# Patient Record
Sex: FEMALE | Race: WHITE | NOT HISPANIC OR LATINO
[De-identification: names, ages, dates, MRNs, and addresses within clinical notes are randomized per-mention and may not be internally consistent; named-entity substitution may affect disease eponyms.]

---

## 2017-05-30 ENCOUNTER — APPOINTMENT (OUTPATIENT)
Dept: RADIOLOGY | Facility: HOSPITAL | Age: 42
End: 2017-05-30

## 2017-05-30 ENCOUNTER — OUTPATIENT (OUTPATIENT)
Dept: OUTPATIENT SERVICES | Facility: HOSPITAL | Age: 42
LOS: 1 days | End: 2017-05-30
Payer: COMMERCIAL

## 2017-05-30 PROBLEM — Z00.00 ENCOUNTER FOR PREVENTIVE HEALTH EXAMINATION: Status: ACTIVE | Noted: 2017-05-30

## 2017-05-30 PROCEDURE — 72100 X-RAY EXAM L-S SPINE 2/3 VWS: CPT

## 2017-05-30 PROCEDURE — 72100 X-RAY EXAM L-S SPINE 2/3 VWS: CPT | Mod: 26

## 2019-03-07 ENCOUNTER — APPOINTMENT (OUTPATIENT)
Dept: NEUROLOGY | Facility: CLINIC | Age: 44
End: 2019-03-07
Payer: COMMERCIAL

## 2019-03-07 VITALS
RESPIRATION RATE: 20 BRPM | HEART RATE: 103 BPM | SYSTOLIC BLOOD PRESSURE: 110 MMHG | WEIGHT: 105 LBS | BODY MASS INDEX: 19.32 KG/M2 | OXYGEN SATURATION: 99 % | HEIGHT: 62 IN | DIASTOLIC BLOOD PRESSURE: 70 MMHG | TEMPERATURE: 98 F

## 2019-03-07 PROCEDURE — 99204 OFFICE O/P NEW MOD 45 MIN: CPT

## 2019-03-08 ENCOUNTER — TRANSCRIPTION ENCOUNTER (OUTPATIENT)
Age: 44
End: 2019-03-08

## 2019-03-08 ENCOUNTER — APPOINTMENT (OUTPATIENT)
Dept: ORTHOPEDIC SURGERY | Facility: CLINIC | Age: 44
End: 2019-03-08
Payer: COMMERCIAL

## 2019-03-08 VITALS — HEIGHT: 62 IN | BODY MASS INDEX: 19.32 KG/M2 | WEIGHT: 105 LBS

## 2019-03-08 DIAGNOSIS — Z82.49 FAMILY HISTORY OF ISCHEMIC HEART DISEASE AND OTHER DISEASES OF THE CIRCULATORY SYSTEM: ICD-10-CM

## 2019-03-08 DIAGNOSIS — Z78.9 OTHER SPECIFIED HEALTH STATUS: ICD-10-CM

## 2019-03-08 LAB
BASOPHILS # BLD AUTO: 0.06 K/UL
BASOPHILS NFR BLD AUTO: 0.6 %
EOSINOPHIL # BLD AUTO: 0.4 K/UL
EOSINOPHIL NFR BLD AUTO: 3.8 %
HCT VFR BLD CALC: 42.4 %
HGB BLD-MCNC: 13.6 G/DL
IMM GRANULOCYTES NFR BLD AUTO: 0.3 %
LYMPHOCYTES # BLD AUTO: 3.01 K/UL
LYMPHOCYTES NFR BLD AUTO: 28.4 %
MAN DIFF?: NORMAL
MCHC RBC-ENTMCNC: 30.9 PG
MCHC RBC-ENTMCNC: 32.1 GM/DL
MCV RBC AUTO: 96.4 FL
MONOCYTES # BLD AUTO: 0.89 K/UL
MONOCYTES NFR BLD AUTO: 8.4 %
NEUTROPHILS # BLD AUTO: 6.2 K/UL
NEUTROPHILS NFR BLD AUTO: 58.5 %
PLATELET # BLD AUTO: 280 K/UL
RBC # BLD: 4.4 M/UL
RBC # FLD: 13.1 %
WBC # FLD AUTO: 10.59 K/UL

## 2019-03-08 PROCEDURE — 99204 OFFICE O/P NEW MOD 45 MIN: CPT

## 2019-03-08 NOTE — PHYSICAL EXAM
[de-identified] : Constitutional\par o Appearance : well-nourished, well developed, alert, in no acute distress \par Head and Face\par o Head :\par ¦ Inspection : atraumatic, normocephalic\par o Face :\par ¦ Inspection : no visible rash or discoloration\par Respiratory\par o Respiratory Effort: breathing unlabored \par Lymphatic\par o Epitrochlear Nodes : no lymphadenopathy\par o Popliteal Nodes : no palpable nodes present\par o Supraclavicular Nodes : no supraclavicular nodes present\par o Preauricular Nodes : no preauricular nodes present\par o Additional Nodes : no additional adenopathy present \par Skin and Subcutaneous Tissue\par o Head and Neck :\par ¦ Face : no facial lesions\par o Body Hair : body hair distribution normal for age \par Neurologic\par o Mental Status Examination :\par ¦ Orientation : grossly oriented to person, place and time \par o Sensation :\par ¦ Pin Prick : sensation intact to pin prick in extremities\par o Coordination : finger-to-nose-to-finger testing normal bilaterally, heel-shin cerebellar test within normal limits bilaterally \par Psychiatric\par o Mood and Affect: mood normal, affect appropriate \par Cardiovascular\par o Peripheral Vascular System :\par ¦ Abdominal Aorta : aortic pulse normal without bruits\par ¦ Femoral Artery : pulse 2+\par ¦ Dorsalis Pedis Artery : pulse 2+\par \par Lumbosacral Spine\par o Inspection : no lesions or deformities\par o Palpation : paraspinal musculature nontender, sciatic notch tenderness on the right. \par o Stability : no subluxations present\par o Range of Motion : side bending to right provokes right buttock pain, otherwise full and painless arc of motion in all planes, rotation does not provoke discomfort\par o Strength: extension of the toes 5/5\par o Tests: Straight leg raising and Baltazar tests are negative\par Constitutional\par o Appearance : well-nourished, well developed, alert, in no acute distress \par Head and Face\par o Head :\par ¦ Inspection : atraumatic, normocephalic\par o Face :\par ¦ Inspection : no visible rash or discoloration\par Lymphatic\par o Epitrochlear Nodes : no lymphadenopathy \par o Popliteal Nodes : no palpable nodes present \par o Supraclavicular Nodes : no supraclavicular nodes present \par o Preauricular Nodes : no preauricular nodes present \par o Additional Nodes : no additional adenopathy present \par Skin and Subcutaneous Tissue \par o Head and Neck :\par ¦ Face : no facial lesions \par o Trunk :\par ¦ Back : no rashes present, no lesions present, no areas of discoloration \par Body Hair : body hair distribution normal for age\par Neurologic\par o Mental Status Examination :\par ¦ Orientation : alert and oriented X 3\par o Coordination : finger-to-nose-to-finger testing normal bilaterally, heel-shin cerebellar test within normal limits bilaterally \par Psychiatric\par o Mood and Affect: mood normal, affect appropriate \par Cardiovascular\par o Peripheral Vascular System :\par ¦ Femoral Artery : pulse 2+\par ¦ Dorsalis Pedis Artery : pulse 2+\par ¦ Posterior Tibial Artery : pulse 2+\par \par Right Lower Extremity\par o Buttock : no tenderness, swelling or deformities \par o Hip :\par ¦ Inspection/Palpation : no tenderness, swelling or deformities\par ¦ Range of Motion : full and painless in all planes, no crepitance\par ¦ Stability : joint stability intact\par ¦ Strength : extension, flexion, adduction, abduction, internal rotation and external rotation 4-/5 \par \par Left Lower Extremity\par o Buttock : no tenderness, swelling or deformities \par o Hip :\par ¦ Inspection/Palpation : no tenderness, swelling or deformities\par ¦ Range of Motion : full and painless in all planes, no crepitance\par ¦ Stability : joint stability intact\par ¦ Strength : extension, flexion, adduction, abduction, internal rotation and external rotation 4-/5\par  \par Right Lower Extremity\par o Reflexes : patellar tendon reflex 3+, ankle reflex 2+, Babinski sign absent (toes downgoing) \par Left Lower Extremity\par o Reflexes : patellar tendon reflex 3+, ankle reflex 2+, Babinski sign absent (toes downgoing) \par \par Gait and Station\par o Gait: gait normal, poor core strength

## 2019-03-08 NOTE — DISCUSSION/SUMMARY
[de-identified] : At this point, we talked about the nature of the condition and treatment options. I am strongly recommending she continue with physical therapy,  and prescription for PT was provided for her lower back, core strengthening. She will avoid heavy lifting, extension exercises, and use ice prn. We do not believe she is a surgical candidate as she continues to improve.

## 2019-03-08 NOTE — HISTORY OF PRESENT ILLNESS
[de-identified] : 43 year old female, employed as a , presents with low back pain onset 2-3 years ago that worsened over the past 4 months. She denies specific trauma/injury. Pain provoked with walking more than one city block. She complains of numbness/tingling all the way through the toes in an L5 distribution. She has right leg pain which she attributes to compensating. She had 3 epidural injections with Dr. Sargent with moderate relief of symptoms. She is currently enrolled in formal physical therapy. \par \par MRI lumbar spine 11- at [facility] was reviewed and reveals spondylosis with disc desiccation and disc bulges L2/L5 most prominent at L4/5 with facet arthropathy L3/4 and L4/5, moderate spinal central stenosis, compression L5 nerve root on the left.

## 2019-03-09 LAB
FOLATE SERPL-MCNC: 15.9 NG/ML
VIT B12 SERPL-MCNC: 642 PG/ML

## 2019-03-09 NOTE — ASSESSMENT
[FreeTextEntry1] : Advised neurosurgical opinion for lumbar canal stenosis.\par \par R/O B12 deficiency.

## 2019-03-09 NOTE — PHYSICAL EXAM
[Person] : oriented to person [Place] : oriented to place [Time] : oriented to time [Concentration Intact] : normal concentrating ability [Visual Intact] : visual attention was ~T not ~L decreased [Naming Objects] : no difficulty naming common objects [Repeating Phrases] : no difficulty repeating a phrase [Writing A Sentence] : no difficulty writing a sentence [Fluency] : fluency intact [Comprehension] : comprehension intact [Reading] : reading intact [Past History] : adequate knowledge of personal past history [Cranial Nerves Optic (II)] : visual acuity intact bilaterally,  visual fields full to confrontation, pupils equal round and reactive to light [Cranial Nerves Oculomotor (III)] : extraocular motion intact [Cranial Nerves Trigeminal (V)] : facial sensation intact symmetrically [Cranial Nerves Facial (VII)] : face symmetrical [Cranial Nerves Vestibulocochlear (VIII)] : hearing was intact bilaterally [Cranial Nerves Glossopharyngeal (IX)] : tongue and palate midline [Cranial Nerves Accessory (XI - Cranial And Spinal)] : head turning and shoulder shrug symmetric [Cranial Nerves Hypoglossal (XII)] : there was no tongue deviation with protrusion [Motor Tone] : muscle tone was normal in all four extremities [Motor Strength] : muscle strength was normal in all four extremities [No Muscle Atrophy] : normal bulk in all four extremities [Sensation Tactile Decrease] : light touch was intact [Romberg's Sign] : a positive Romberg's sign was present [Abnormal Walk] : normal gait [Balance] : balance was intact [Past-pointing] : there was no past-pointing [Tremor] : no tremor present [3+] : Patella left 3+ [2+] : Ankle jerk left 2+ [Plantar Reflex Right Only] : normal on the right [Plantar Reflex Left Only] : normal on the left [___] : absent on the right [___] : absent on the left [FreeTextEntry7] : distal sensory loss to pin and vibration. Position sense impaired in toes

## 2019-03-13 LAB — METHYLMALONATE SERPL-SCNC: 109 NMOL/L

## 2019-03-14 ENCOUNTER — TRANSCRIPTION ENCOUNTER (OUTPATIENT)
Age: 44
End: 2019-03-14

## 2019-03-19 ENCOUNTER — APPOINTMENT (OUTPATIENT)
Dept: SPINE | Facility: CLINIC | Age: 44
End: 2019-03-19
Payer: COMMERCIAL

## 2019-03-19 VITALS
BODY MASS INDEX: 20.24 KG/M2 | HEIGHT: 62 IN | DIASTOLIC BLOOD PRESSURE: 83 MMHG | WEIGHT: 110 LBS | SYSTOLIC BLOOD PRESSURE: 153 MMHG

## 2019-03-19 DIAGNOSIS — R20.0 ANESTHESIA OF SKIN: ICD-10-CM

## 2019-03-19 PROCEDURE — 99203 OFFICE O/P NEW LOW 30 MIN: CPT

## 2019-03-19 NOTE — REASON FOR VISIT
[New Patient Visit] : a new patient visit [Referred By: _________] : Patient was referred by FELICITA [Other: _____] : [unfilled] [FreeTextEntry1] : Lumbar radiculopathy

## 2019-03-19 NOTE — HISTORY OF PRESENT ILLNESS
[> 3 months] : more  than 3 months [FreeTextEntry1] : Lower extremity numbness and difficulty walking [de-identified] : 43 year old white female who presents with a two and half year history of lumbar back pain and lower  extremity numbness and pain into the ankles.  She has weakness of her left foot and  bilateral numbness of her soles.  She has undergone  PT and epidural injections which have not resolved her pain and numbness.  She was placed on Gabapentin and has noted elvia difference.

## 2019-03-19 NOTE — ASSESSMENT
[FreeTextEntry1] : Assess;\par lumbar radiculopathy\par Herniated disc at L5 S 1\par Failed conservative management of PT and epidurals\par Weakness and foot drop of left leg \par \par PLAN: \par Surgical intervention discussed\par Microdiskectomy vs fusion, will need scoliosis xray\par scoliosis x-ray and return to office

## 2019-03-25 NOTE — HISTORY OF PRESENT ILLNESS
[FreeTextEntry1] : 43 yr-old woman noted onset of numbness in toes of right foot 3 years ago and left foot 1 year ago. Her legs feel heavy and weak after walking  or with prolonged standing. She had MRI of LS spine 4 months ago which showed an L4-5 HNP with moderate central canal stenosis at that level. Tarlov cysts were noted at the S2 level. A mild scoliosis was also seen. Melolabial Transposition Flap Text: The defect edges were debeveled with a #15 scalpel blade.  Given the location of the defect and the proximity to free margins a melolabial flap was deemed most appropriate.  Using a sterile surgical marker, an appropriate melolabial transposition flap was drawn incorporating the defect.    The area thus outlined was incised deep to adipose tissue with a #15 scalpel blade.  The skin margins were undermined to an appropriate distance in all directions utilizing iris scissors.

## 2019-04-04 ENCOUNTER — OUTPATIENT (OUTPATIENT)
Dept: OUTPATIENT SERVICES | Facility: HOSPITAL | Age: 44
LOS: 1 days | End: 2019-04-04
Payer: COMMERCIAL

## 2019-04-04 ENCOUNTER — APPOINTMENT (OUTPATIENT)
Dept: RADIOLOGY | Facility: CLINIC | Age: 44
End: 2019-04-04
Payer: COMMERCIAL

## 2019-04-04 DIAGNOSIS — M48.062 SPINAL STENOSIS, LUMBAR REGION WITH NEUROGENIC CLAUDICATION: ICD-10-CM

## 2019-04-04 PROCEDURE — 72082 X-RAY EXAM ENTIRE SPI 2/3 VW: CPT | Mod: 26

## 2019-04-04 PROCEDURE — 72082 X-RAY EXAM ENTIRE SPI 2/3 VW: CPT

## 2019-04-06 ENCOUNTER — APPOINTMENT (OUTPATIENT)
Dept: ORTHOPEDIC SURGERY | Facility: CLINIC | Age: 44
End: 2019-04-06
Payer: COMMERCIAL

## 2019-04-06 VITALS
BODY MASS INDEX: 19.14 KG/M2 | WEIGHT: 104 LBS | HEIGHT: 62 IN | SYSTOLIC BLOOD PRESSURE: 91 MMHG | DIASTOLIC BLOOD PRESSURE: 49 MMHG | HEART RATE: 96 BPM

## 2019-04-06 DIAGNOSIS — Z87.39 PERSONAL HISTORY OF OTHER DISEASES OF THE MUSCULOSKELETAL SYSTEM AND CONNECTIVE TISSUE: ICD-10-CM

## 2019-04-06 DIAGNOSIS — M51.26 OTHER INTERVERTEBRAL DISC DISPLACEMENT, LUMBAR REGION: ICD-10-CM

## 2019-04-06 DIAGNOSIS — M99.79 CONNECTIVE TISSUE AND DISC STENOSIS OF INTERVERTEBRAL FORAMINA OF ABDOMEN AND OTHER REGIONS: ICD-10-CM

## 2019-04-06 PROCEDURE — 99213 OFFICE O/P EST LOW 20 MIN: CPT

## 2019-04-06 RX ORDER — NITROFURANTOIN (MONOHYDRATE/MACROCRYSTALS) 25; 75 MG/1; MG/1
100 CAPSULE ORAL
Qty: 14 | Refills: 0 | Status: DISCONTINUED | COMMUNITY
Start: 2018-12-18

## 2019-04-09 ENCOUNTER — APPOINTMENT (OUTPATIENT)
Dept: ORTHOPEDIC SURGERY | Facility: CLINIC | Age: 44
End: 2019-04-09

## 2019-04-09 ENCOUNTER — APPOINTMENT (OUTPATIENT)
Dept: SPINE | Facility: CLINIC | Age: 44
End: 2019-04-09

## 2019-04-29 ENCOUNTER — RX RENEWAL (OUTPATIENT)
Age: 44
End: 2019-04-29

## 2019-05-08 ENCOUNTER — APPOINTMENT (OUTPATIENT)
Dept: NEUROLOGY | Facility: CLINIC | Age: 44
End: 2019-05-08

## 2019-05-13 ENCOUNTER — APPOINTMENT (OUTPATIENT)
Dept: ORTHOPEDIC SURGERY | Facility: CLINIC | Age: 44
End: 2019-05-13

## 2019-06-21 ENCOUNTER — APPOINTMENT (OUTPATIENT)
Dept: OBGYN | Facility: CLINIC | Age: 44
End: 2019-06-21
Payer: COMMERCIAL

## 2019-06-21 VITALS
BODY MASS INDEX: 19.14 KG/M2 | WEIGHT: 104 LBS | HEIGHT: 62 IN | OXYGEN SATURATION: 98 % | HEART RATE: 105 BPM | DIASTOLIC BLOOD PRESSURE: 70 MMHG | SYSTOLIC BLOOD PRESSURE: 114 MMHG

## 2019-06-21 DIAGNOSIS — Z87.59 PERSONAL HISTORY OF OTHER COMPLICATIONS OF PREGNANCY, CHILDBIRTH AND THE PUERPERIUM: ICD-10-CM

## 2019-06-21 DIAGNOSIS — Z01.419 ENCOUNTER FOR GYNECOLOGICAL EXAMINATION (GENERAL) (ROUTINE) W/OUT ABNORMAL FINDINGS: ICD-10-CM

## 2019-06-21 DIAGNOSIS — Z12.11 ENCOUNTER FOR SCREENING FOR MALIGNANT NEOPLASM OF COLON: ICD-10-CM

## 2019-06-21 DIAGNOSIS — R92.2 INCONCLUSIVE MAMMOGRAM: ICD-10-CM

## 2019-06-21 DIAGNOSIS — Z12.31 ENCOUNTER FOR SCREENING MAMMOGRAM FOR MALIGNANT NEOPLASM OF BREAST: ICD-10-CM

## 2019-06-21 PROCEDURE — 99386 PREV VISIT NEW AGE 40-64: CPT

## 2019-06-21 NOTE — COUNSELING
[Breast Self Exam] : breast self exam [Vitamins/Supplements] : vitamins/supplements [Nutrition] : nutrition [Exercise] : exercise [Contraception] : contraception

## 2019-06-21 NOTE — CHIEF COMPLAINT
[Annual Visit] : annual visit [FreeTextEntry1] : P89 yo daughter--1st yr done at [employer]--loves\par never had mammo.Pap 4 yrs ago\par

## 2019-06-21 NOTE — HISTORY OF PRESENT ILLNESS
[Good] : being in good health [Reproductive Age] : is of reproductive age [HPV Vaccine NA Due to Age] : HPV vaccine not available to patient due to age [Contraception] : uses contraception [Condoms] : uses condoms [Sexually Active] : is sexually active

## 2019-06-23 LAB — HPV HIGH+LOW RISK DNA PNL CVX: NOT DETECTED

## 2019-06-26 LAB — CYTOLOGY CVX/VAG DOC THIN PREP: NORMAL

## 2019-08-27 ENCOUNTER — OUTPATIENT (OUTPATIENT)
Dept: OUTPATIENT SERVICES | Facility: HOSPITAL | Age: 44
LOS: 1 days | Discharge: HOME | End: 2019-08-27
Payer: COMMERCIAL

## 2019-08-27 VITALS
SYSTOLIC BLOOD PRESSURE: 137 MMHG | OXYGEN SATURATION: 99 % | HEART RATE: 81 BPM | DIASTOLIC BLOOD PRESSURE: 88 MMHG | TEMPERATURE: 98 F | WEIGHT: 103.62 LBS | HEIGHT: 62 IN | RESPIRATION RATE: 16 BRPM

## 2019-08-27 DIAGNOSIS — Z01.818 ENCOUNTER FOR OTHER PREPROCEDURAL EXAMINATION: ICD-10-CM

## 2019-08-27 DIAGNOSIS — M54.16 RADICULOPATHY, LUMBAR REGION: ICD-10-CM

## 2019-08-27 DIAGNOSIS — Z87.59 PERSONAL HISTORY OF OTHER COMPLICATIONS OF PREGNANCY, CHILDBIRTH AND THE PUERPERIUM: Chronic | ICD-10-CM

## 2019-08-27 LAB
ANION GAP SERPL CALC-SCNC: 4 MMOL/L — LOW (ref 5–17)
APTT BLD: 28 SEC — SIGNIFICANT CHANGE UP (ref 27.5–36.3)
BUN SERPL-MCNC: 11 MG/DL — SIGNIFICANT CHANGE UP (ref 7–23)
CALCIUM SERPL-MCNC: 8.4 MG/DL — LOW (ref 8.5–10.1)
CHLORIDE SERPL-SCNC: 109 MMOL/L — HIGH (ref 96–108)
CO2 SERPL-SCNC: 29 MMOL/L — SIGNIFICANT CHANGE UP (ref 22–31)
CREAT SERPL-MCNC: 0.74 MG/DL — SIGNIFICANT CHANGE UP (ref 0.5–1.3)
GLUCOSE SERPL-MCNC: 86 MG/DL — SIGNIFICANT CHANGE UP (ref 70–99)
HBA1C BLD-MCNC: 5.4 % — SIGNIFICANT CHANGE UP (ref 4–5.6)
HCG UR QL: NEGATIVE — SIGNIFICANT CHANGE UP
HCT VFR BLD CALC: 42.9 % — SIGNIFICANT CHANGE UP (ref 34.5–45)
HGB BLD-MCNC: 14.2 G/DL — SIGNIFICANT CHANGE UP (ref 11.5–15.5)
INR BLD: 0.96 RATIO — SIGNIFICANT CHANGE UP (ref 0.88–1.16)
MCHC RBC-ENTMCNC: 32.4 PG — SIGNIFICANT CHANGE UP (ref 27–34)
MCHC RBC-ENTMCNC: 33.1 GM/DL — SIGNIFICANT CHANGE UP (ref 32–36)
MCV RBC AUTO: 97.9 FL — SIGNIFICANT CHANGE UP (ref 80–100)
MRSA PCR RESULT.: SIGNIFICANT CHANGE UP
NRBC # BLD: 0 /100 WBCS — SIGNIFICANT CHANGE UP (ref 0–0)
PLATELET # BLD AUTO: 292 K/UL — SIGNIFICANT CHANGE UP (ref 150–400)
POTASSIUM SERPL-MCNC: 4.1 MMOL/L — SIGNIFICANT CHANGE UP (ref 3.5–5.3)
POTASSIUM SERPL-SCNC: 4.1 MMOL/L — SIGNIFICANT CHANGE UP (ref 3.5–5.3)
PROTHROM AB SERPL-ACNC: 10.7 SEC — SIGNIFICANT CHANGE UP (ref 10–12.9)
RBC # BLD: 4.38 M/UL — SIGNIFICANT CHANGE UP (ref 3.8–5.2)
RBC # FLD: 12.9 % — SIGNIFICANT CHANGE UP (ref 10.3–14.5)
S AUREUS DNA NOSE QL NAA+PROBE: SIGNIFICANT CHANGE UP
SODIUM SERPL-SCNC: 142 MMOL/L — SIGNIFICANT CHANGE UP (ref 135–145)
WBC # BLD: 10.33 K/UL — SIGNIFICANT CHANGE UP (ref 3.8–10.5)
WBC # FLD AUTO: 10.33 K/UL — SIGNIFICANT CHANGE UP (ref 3.8–10.5)

## 2019-08-27 PROCEDURE — 93010 ELECTROCARDIOGRAM REPORT: CPT

## 2019-08-27 NOTE — H&P PST ADULT - NEGATIVE MUSCULOSKELETAL SYMPTOMS
no leg pain L/no muscle cramps/no muscle weakness/no neck pain/no arm pain L/no arm pain R/no stiffness/no leg pain R/no joint swelling/no myalgia

## 2019-08-27 NOTE — H&P PST ADULT - HISTORY OF PRESENT ILLNESS
43 y/o female w/ no PMH   pt states 2016 started feeling low back pain, went to chiroprator, accupuncture, pt,  and 4 cortisone shoots w/o relief. States has scoliosis but also dx w/ herniated disc noted on MRI x 2 done by Dr Dupont. 45 y/o female w/ no PMH. Presents to PST w/ a preop dx of radiculopathy, lumbar region and to be evaluated for a scheduled decompression laminectomy no fusion left L4-5with image on 9/10/19.  pt states 2016 started feeling low back pain, went to chiropractor acupuncture pt,  and 4 cortisone shoots w/o relief. States has scoliosis but also dx w/ herniated disc noted on MRI x 2 done by Dr Dupont. 43 y/o female w/ no PMH. Presents to PST w/ a preop dx of radiculopathy, lumbar region and to be evaluated for a scheduled decompression laminectomy no fusion left L4-5with image on 9/10/19.  pt states 2016 started feeling low back pain, went to chiropractor acupuncture, Pt,  and 4 cortisone shoots w/o relief. States has scoliosis but also dx w/ herniated disc noted on MRI x 2 done by Dr Dupont. Pt now recommended surgical intervention.

## 2019-08-27 NOTE — H&P PST ADULT - NSICDXPROBLEM_GEN_ALL_CORE_FT
PROBLEM DIAGNOSES  Problem: Lumbar radiculopathy  Assessment and Plan: Pt. evaluated for a scheduled decompression laminectomy no fusion left L4-5with image on 9/10/19. preop instructions provided including NPO status, Hibiclens protocol, stop MVI, OTC herbals and NSAIDs starting on 9/3/19. Verbalized understanding.

## 2019-08-27 NOTE — H&P PST ADULT - ATTENDING COMMENTS
patient with L4-5 disc herniation and stenosis and failure of conservative tx - r/b/e of the operation and questions answered - she is well informed

## 2019-08-27 NOTE — H&P PST ADULT - ASSESSMENT
DX: DX: radiculopathy, lumbar region and evaluated for a scheduled decompression laminectomy no fusion left L4-5with image on 9/10/19.

## 2019-08-27 NOTE — H&P PST ADULT - NSANTHOSAYNRD_GEN_A_CORE
No. MYRIAM screening performed.  STOP BANG Legend: 0-2 = LOW Risk; 3-4 = INTERMEDIATE Risk; 5-8 = HIGH Risk/never tested

## 2019-08-27 NOTE — H&P PST ADULT - PRIMARY CARE PROVIDER
Med station station clinic in [facility] Med station station clinic in [facility] (sees different practitioners)

## 2019-08-27 NOTE — H&P PST ADULT - MUSCULOSKELETAL
details… detailed exam ROM intact/normal strength/no joint swelling/no joint warmth/no joint erythema/no calf tenderness

## 2019-08-27 NOTE — H&P PST ADULT - NEGATIVE NEUROLOGICAL SYMPTOMS
no tremors/no vertigo/no loss of consciousness/no hemiparesis/no difficulty walking/no confusion/no transient paralysis/no generalized seizures/no focal seizures/no headache/no syncope

## 2019-08-27 NOTE — H&P PST ADULT - RS GEN PE MLT RESP DETAILS PC
no chest wall tenderness/no rhonchi/no subcutaneous emphysema/respirations non-labored/no intercostal retractions/good air movement/no wheezes/airway patent/no rales/clear to auscultation bilaterally/normal

## 2019-09-09 ENCOUNTER — TRANSCRIPTION ENCOUNTER (OUTPATIENT)
Age: 44
End: 2019-09-09

## 2019-09-10 ENCOUNTER — OUTPATIENT (OUTPATIENT)
Dept: OUTPATIENT SERVICES | Facility: HOSPITAL | Age: 44
LOS: 1 days | Discharge: HOME | End: 2019-09-10

## 2019-09-10 VITALS
OXYGEN SATURATION: 97 % | HEIGHT: 62 IN | DIASTOLIC BLOOD PRESSURE: 85 MMHG | SYSTOLIC BLOOD PRESSURE: 134 MMHG | HEART RATE: 83 BPM | RESPIRATION RATE: 18 BRPM | TEMPERATURE: 97 F | WEIGHT: 103.62 LBS

## 2019-09-10 VITALS
OXYGEN SATURATION: 99 % | SYSTOLIC BLOOD PRESSURE: 125 MMHG | DIASTOLIC BLOOD PRESSURE: 77 MMHG | RESPIRATION RATE: 18 BRPM | HEART RATE: 90 BPM

## 2019-09-10 DIAGNOSIS — Z87.59 PERSONAL HISTORY OF OTHER COMPLICATIONS OF PREGNANCY, CHILDBIRTH AND THE PUERPERIUM: Chronic | ICD-10-CM

## 2019-09-10 LAB — HCG UR QL: NEGATIVE — SIGNIFICANT CHANGE UP

## 2019-09-10 RX ORDER — HYDROMORPHONE HYDROCHLORIDE 2 MG/ML
0.5 INJECTION INTRAMUSCULAR; INTRAVENOUS; SUBCUTANEOUS
Refills: 0 | Status: DISCONTINUED | OUTPATIENT
Start: 2019-09-10 | End: 2019-09-10

## 2019-09-10 RX ORDER — ACETAMINOPHEN 500 MG
1000 TABLET ORAL ONCE
Refills: 0 | Status: COMPLETED | OUTPATIENT
Start: 2019-09-10 | End: 2019-09-10

## 2019-09-10 RX ORDER — METOCLOPRAMIDE HCL 10 MG
10 TABLET ORAL ONCE
Refills: 0 | Status: DISCONTINUED | OUTPATIENT
Start: 2019-09-10 | End: 2019-09-10

## 2019-09-10 RX ORDER — ACETAMINOPHEN 500 MG
100 TABLET ORAL
Qty: 0 | Refills: 0 | DISCHARGE
Start: 2019-09-10

## 2019-09-10 RX ORDER — SODIUM CHLORIDE 9 MG/ML
1000 INJECTION, SOLUTION INTRAVENOUS
Refills: 0 | Status: DISCONTINUED | OUTPATIENT
Start: 2019-09-10 | End: 2019-09-10

## 2019-09-10 RX ADMIN — Medication 400 MILLIGRAM(S): at 11:07

## 2019-09-10 RX ADMIN — HYDROMORPHONE HYDROCHLORIDE 0.5 MILLIGRAM(S): 2 INJECTION INTRAMUSCULAR; INTRAVENOUS; SUBCUTANEOUS at 11:41

## 2019-09-10 RX ADMIN — HYDROMORPHONE HYDROCHLORIDE 0.5 MILLIGRAM(S): 2 INJECTION INTRAMUSCULAR; INTRAVENOUS; SUBCUTANEOUS at 13:17

## 2019-09-10 RX ADMIN — HYDROMORPHONE HYDROCHLORIDE 0.5 MILLIGRAM(S): 2 INJECTION INTRAMUSCULAR; INTRAVENOUS; SUBCUTANEOUS at 13:07

## 2019-09-10 RX ADMIN — HYDROMORPHONE HYDROCHLORIDE 0.5 MILLIGRAM(S): 2 INJECTION INTRAMUSCULAR; INTRAVENOUS; SUBCUTANEOUS at 11:55

## 2019-09-10 RX ADMIN — HYDROMORPHONE HYDROCHLORIDE 0.5 MILLIGRAM(S): 2 INJECTION INTRAMUSCULAR; INTRAVENOUS; SUBCUTANEOUS at 11:42

## 2019-09-10 RX ADMIN — HYDROMORPHONE HYDROCHLORIDE 0.5 MILLIGRAM(S): 2 INJECTION INTRAMUSCULAR; INTRAVENOUS; SUBCUTANEOUS at 12:19

## 2019-09-10 RX ADMIN — HYDROMORPHONE HYDROCHLORIDE 0.5 MILLIGRAM(S): 2 INJECTION INTRAMUSCULAR; INTRAVENOUS; SUBCUTANEOUS at 12:04

## 2019-09-10 RX ADMIN — SODIUM CHLORIDE 75 MILLILITER(S): 9 INJECTION, SOLUTION INTRAVENOUS at 11:08

## 2019-09-10 RX ADMIN — Medication 1000 MILLIGRAM(S): at 11:52

## 2019-09-10 RX ADMIN — HYDROMORPHONE HYDROCHLORIDE 0.5 MILLIGRAM(S): 2 INJECTION INTRAMUSCULAR; INTRAVENOUS; SUBCUTANEOUS at 11:06

## 2019-09-10 NOTE — ASU DISCHARGE PLAN (ADULT/PEDIATRIC) - CALL YOUR DOCTOR IF YOU HAVE ANY OF THE FOLLOWING:
Swelling that gets worse/Nausea and vomiting that does not stop/Pain not relieved by Medications/Fever greater than (need to indicate Fahrenheit or Celsius)/Wound/Surgical Site with redness, or foul smelling discharge or pus

## 2019-09-10 NOTE — ASU DISCHARGE PLAN (ADULT/PEDIATRIC) - ASU DC SPECIAL INSTRUCTIONSFT
okay to change dressing post op day #3 with sterile gauze. keep incision clean and dry. if gauze gets wet it must be changed.

## 2019-09-10 NOTE — ASU PATIENT PROFILE, ADULT - REASON FOR ADMISSION, PROFILE
[facility]  lumbar pain

## 2019-09-12 DIAGNOSIS — Z72.0 TOBACCO USE: ICD-10-CM

## 2019-09-12 DIAGNOSIS — Z79.1 LONG TERM (CURRENT) USE OF NON-STEROIDAL ANTI-INFLAMMATORIES (NSAID): ICD-10-CM

## 2019-09-12 DIAGNOSIS — M48.061 SPINAL STENOSIS, LUMBAR REGION WITHOUT NEUROGENIC CLAUDICATION: ICD-10-CM

## 2020-02-25 NOTE — ASU DISCHARGE PLAN (ADULT/PEDIATRIC) - OK TO LEAVE MESSAGE ON VOICEMAIL
Yes Complex Repair And O-L Flap Text: The defect edges were debeveled with a #15 scalpel blade.  The primary defect was closed partially with a complex linear closure.  Given the location of the remaining defect, shape of the defect and the proximity to free margins an O-L flap was deemed most appropriate for complete closure of the defect.  Using a sterile surgical marker, an appropriate flap was drawn incorporating the defect and placing the expected incisions within the relaxed skin tension lines where possible.    The area thus outlined was incised deep to adipose tissue with a #15 scalpel blade.  The skin margins were undermined to an appropriate distance in all directions utilizing iris scissors.

## 2020-12-21 PROBLEM — Z01.419 ENCOUNTER FOR ANNUAL ROUTINE GYNECOLOGICAL EXAMINATION: Status: RESOLVED | Noted: 2019-06-21 | Resolved: 2020-12-21

## 2021-02-13 ENCOUNTER — TRANSCRIPTION ENCOUNTER (OUTPATIENT)
Age: 46
End: 2021-02-13

## 2021-09-22 PROBLEM — Z78.9 OTHER SPECIFIED HEALTH STATUS: Chronic | Status: ACTIVE | Noted: 2019-08-27

## 2021-10-07 ENCOUNTER — NON-APPOINTMENT (OUTPATIENT)
Age: 46
End: 2021-10-07

## 2021-10-08 ENCOUNTER — NON-APPOINTMENT (OUTPATIENT)
Age: 46
End: 2021-10-08

## 2021-10-08 ENCOUNTER — APPOINTMENT (OUTPATIENT)
Dept: OPHTHALMOLOGY | Facility: CLINIC | Age: 46
End: 2021-10-08
Payer: COMMERCIAL

## 2021-10-08 PROCEDURE — 92083 EXTENDED VISUAL FIELD XM: CPT

## 2021-10-08 PROCEDURE — 92133 CPTRZD OPH DX IMG PST SGM ON: CPT

## 2021-10-08 PROCEDURE — 99204 OFFICE O/P NEW MOD 45 MIN: CPT

## 2023-04-30 ENCOUNTER — NON-APPOINTMENT (OUTPATIENT)
Age: 48
End: 2023-04-30

## 2023-05-01 ENCOUNTER — APPOINTMENT (OUTPATIENT)
Dept: NEUROLOGY | Facility: CLINIC | Age: 48
End: 2023-05-01
Payer: COMMERCIAL

## 2023-05-01 VITALS
SYSTOLIC BLOOD PRESSURE: 139 MMHG | DIASTOLIC BLOOD PRESSURE: 90 MMHG | WEIGHT: 105 LBS | HEART RATE: 90 BPM | HEIGHT: 62 IN | BODY MASS INDEX: 19.32 KG/M2

## 2023-05-01 DIAGNOSIS — Z87.891 PERSONAL HISTORY OF NICOTINE DEPENDENCE: ICD-10-CM

## 2023-05-01 DIAGNOSIS — F41.9 ANXIETY DISORDER, UNSPECIFIED: ICD-10-CM

## 2023-05-01 DIAGNOSIS — I10 ESSENTIAL (PRIMARY) HYPERTENSION: ICD-10-CM

## 2023-05-01 PROCEDURE — 99205 OFFICE O/P NEW HI 60 MIN: CPT

## 2023-05-01 RX ORDER — AMLODIPINE BESYLATE 5 MG/1
5 TABLET ORAL
Qty: 90 | Refills: 0 | Status: ACTIVE | COMMUNITY
Start: 2023-03-14

## 2023-05-01 RX ORDER — GABAPENTIN 300 MG/1
300 CAPSULE ORAL
Refills: 0 | Status: DISCONTINUED | COMMUNITY
End: 2023-05-01

## 2023-05-01 RX ORDER — TIZANIDINE 4 MG/1
4 TABLET ORAL
Qty: 20 | Refills: 0 | Status: DISCONTINUED | COMMUNITY
Start: 2019-04-06 | End: 2023-05-01

## 2023-05-01 RX ORDER — CLOBETASOL PROPIONATE 0.5 MG/G
0.05 CREAM TOPICAL
Qty: 60 | Refills: 0 | Status: ACTIVE | COMMUNITY
Start: 2023-03-28

## 2023-05-01 RX ORDER — ALPRAZOLAM 0.25 MG/1
0.25 TABLET ORAL
Qty: 7 | Refills: 0 | Status: ACTIVE | COMMUNITY
Start: 2023-03-14

## 2023-05-02 NOTE — PHYSICAL EXAM
[FreeTextEntry1] : PHYSICAL EXAM\par Constitutional: Alert, no acute distress \par Psychiatric: appropriate affect and mood\par Pulmonary: No respiratory distress, stable on room air\par \par NEUROLOGICAL EXAM\par Mental status: The patient is alert, attentive and conversational memory intact.\par Speech/language: Mild dysarthria \par Cranial nerves:\par CN II: ? RAPD OS\par CN III, IV, VI: EOMI, no nystagmus, no ptosis\par CN V: Facial sensation is intact to pinprick in all 3 divisions bilaterally.\par CN VII: Face is symmetric with normal eye closure and smile.\par CN VII: Hearing is normal to rubbing fingers\par CN IX, X: Palate elevates symmetrically. Phonation is normal.\par CN XI: Head turning and shoulder shrug are intact\par CN XII: Tongue is midline with normal movements and no atrophy.\par Motor: RUE 5/5. LUE 5/5, Except triceps 4/5 and hand  4+/5\par            RLE: 4+/5 HF, 5/5 KE/KF, 4+/5 DF and 5/5 PF \par            LLE: 3/5 HF, 5/5 KE, 2/5 DF, 4/5 PF\par Reflexes:                 R        L\par                  Biceps    3+       3+\par                  Patellar   3+       3+\par                  Achilles  2+       2+\par                  Plantar responses- Mute b/l\par Sensory: Sensations intact to LT UE and LE.\par Coordination: Dysmetria on FNF and HTS b/l (L>R). Head tremor and b/l action/postural hand tremors. \par Gait/Stance: Wide based, ataxic gait. Truncal ataxia. Left leg circumduction with high steppage. \par \par \par \par

## 2023-05-02 NOTE — HISTORY OF PRESENT ILLNESS
[FreeTextEntry1] : HPI (initial visit May 01, 2023)- SUGAR PALMA is a 48 year old woman referred for hx of MS. She has been a patient of Dr. Mitchell's (records available for review)- no longer accepting her insurance, now transfering care to [facility]. \par \par MS hx- Diagnosed in June 2020.\par \par 2018- Left foot started to drag, "left foot drop". Numbness in toes (noticed over time). \par 2019- Diagnosed with lumbar spondylosis, s/p L4/L5 laminectomy. No improvement in symptoms. \par 2020- Saw Dr Mitchell, recommended MRI brain and C/T spine- evidence of MS. Never had spinal tap.\par 9/2020- Started Ocrevus, last infusion 3/15/2023.\par \par "I do not recall any exacerbations". There has been a gradual weakness in left foot- continue to slowly progress.\par Never had optic neuritis. Saw Dr. 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 [facility] 20/25.\par She has spasticity in legs, gets botox every 3 months- it helps. Spasms can be severe, gale in morning. \par Amypra helped gait- but insurance denied. \par No bladder issues, in past had recurrent UTI's\par She reports continued progression in gait and foot drop even since the start of Ocrevus, however on reviewing Dr. Mitchell's notes he documented clinical stability since 9'2020 and improvement in gait on Ocrevus with worsening in the setting of late Botox injections. \par \par Last brain MRI 4/2022 and C/T spine 11/2022- stable per patient at [facility]. (reviewed reports)-\par MRI brain 4/2022- stable disease burden compared to 9/2021. No active lesions\par MRI C and T spine 11/2022- stable c/w 9/2021. Diffuse/patchy lesions from C1/C2 to T2/T3, C5-T5/6 and scattered T7-T8-T10, ? T2 focal cord atrophy. \par \par Labs 12'22- Serum NMO ab neg, MOG ab + (1:20- borderline + -- likely false positive)\par

## 2023-05-02 NOTE — ASSESSMENT
[FreeTextEntry1] : Assessment/Plan:\par  48 year old female w/ hx of progressive LLE weakness, ataxia and spasticity, diagnosed with multiple sclerosis in June 2020 and has been on Ocrevus since 9/2020. 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 \par Not active, ? progressing\par \par Unclear if there has been true clinical progression since start of Ocrevus, or if she experiences subjective worsening due to spasticity and fatigue (missing Botox, recent denial of Ampyra and Adderall). Need to continue to monitor. \par \par Plan: \par 1. Diagnostic Plan/Imaging: MRI brain w/w/o contrast for radiological stability. Pt will request disc of prior MRI.  \par \par 2. Disease Modifying therapy plan:\par Continue Ocrevus 600 mg q6 monthly infusions\par Ocrevus labs every 6 months\par \par 3. Symptomatic therapy plan:\par () Fatigue: On Adderall (has not been on it for a few months due to insurance)- Will fill as 5 mg ER (2 capsules in AM)\par () Spasticity: On baclofen 20 mg TID. Will refer to [facility] for Botox.\par () Neuropathic pain: On gabapentin 600 mg BID\par () Bladder Dysfunction: No issues at present\par () Mobility: COntinue PT. Will prescribe ampyra. \par () Anxiety/depression: sporadically uses xanax. On Cymbalta 60 mg QD\par () Vitamin D3 and B12 supplementations \par \par \par Return to clinic 3 months\par \par The above plan was discussed with SUGAR PALMA in great detail.  SUGAR PALMA verbalized understanding and agrees with plan as detailed above. Patient was provided education and counselling on current diagnosis/symptoms. She was advised to call our clinic at [phone] for any new or worsening symptoms, or with any questions or concerns. In case of acute onset of neurological symptoms or worsening presentation, patient was advised to present to nearest emergency room for further evaluation. SUGAR LOUIE expressed understanding and all her questions/concerns were addressed.\par \par Tamara Tomlin M.D\par

## 2023-05-02 NOTE — DATA REVIEWED
[de-identified] : Last brain MRI 4/2022 and C/T spine 11/2022- stable per patient at [facility]. (reviewed reports)-\par MRI brain 4/2022- stable disease burden compared to 9/2021. No active lesions\par MRI C and T spine 11/2022- stable c/w 9/2021. Diffuse/patchy lesions from C1/C2 to T2/T3, C5-T5/6 and scattered T7-T8-T10, ? T2 focal cord atrophy. \par

## 2023-05-22 ENCOUNTER — NON-APPOINTMENT (OUTPATIENT)
Age: 48
End: 2023-05-22

## 2023-05-22 ENCOUNTER — APPOINTMENT (OUTPATIENT)
Dept: PHYSICAL MEDICINE AND REHAB | Facility: CLINIC | Age: 48
End: 2023-05-22
Payer: COMMERCIAL

## 2023-05-22 VITALS — SYSTOLIC BLOOD PRESSURE: 127 MMHG | OXYGEN SATURATION: 98 % | DIASTOLIC BLOOD PRESSURE: 84 MMHG | HEART RATE: 80 BPM

## 2023-05-22 PROCEDURE — 95874 GUIDE NERV DESTR NEEDLE EMG: CPT

## 2023-05-22 PROCEDURE — 64642 CHEMODENERV 1 EXTREMITY 1-4: CPT

## 2023-05-22 PROCEDURE — 99203 OFFICE O/P NEW LOW 30 MIN: CPT | Mod: 25

## 2023-05-22 NOTE — HISTORY OF PRESENT ILLNESS
[FreeTextEntry1] : 47 yo with h/o MS (diagnosed in June 2020- Primary Progressive per neuro note)\par Has been followed with another physician for botox injections into the LLE\par Last injected in over 3 months.\par Presents for botox.\par Has LLE tightness, difficulty walking.

## 2023-05-22 NOTE — PHYSICAL EXAM
[Normal] : Oriented to person, place, and time, insight and judgement were intact and the affect was normal [FreeTextEntry1] : Amb w/o AD- + revurvatume [de-identified] : no distress [de-identified] : well perfused [de-identified] : soft [de-identified] : Amb w/o AD- + L recurvatum [de-identified] : Motor nml grade throughout, MAS 1+ spasticity L APF, no clonus

## 2023-05-22 NOTE — ASSESSMENT
[FreeTextEntry1] : - Post-injection instructions provided. Patient informed that should take 10-14 days prior to seeing improvement. Will notify me if there is any redness or discharge noted at injection sites. Ice and/or tyelenol prn muscle soreness.\par - Continue current therapies. \par - Follow up in 6 weeks.\par - Will continue to f/u with neurology

## 2023-05-22 NOTE — PROCEDURE
[Consent] : consent was given by patient or guardian [Site Verification] : the injection site was verified [Post-Injection Instructions Provided] : post-injection instructions were provided [] : EMG Guidance was used during the procedure [TWNoteComboBox1] : Medial Gastrocnemius [TWNoteComboBox2] : 100 Units [de-identified] : Lateral Gastrocnemius [de-identified] : 50 Units [de-identified] : Soleus [de-identified] : 50 Units

## 2023-07-07 ENCOUNTER — APPOINTMENT (OUTPATIENT)
Dept: PHYSICAL MEDICINE AND REHAB | Facility: CLINIC | Age: 48
End: 2023-07-07
Payer: COMMERCIAL

## 2023-07-07 PROCEDURE — 99213 OFFICE O/P EST LOW 20 MIN: CPT

## 2023-07-07 RX ORDER — ONABOTULINUMTOXINA 100 [USP'U]/1
100 INJECTION, POWDER, LYOPHILIZED, FOR SOLUTION INTRADERMAL; INTRAMUSCULAR
Qty: 3 | Refills: 0 | Status: ACTIVE | OUTPATIENT
Start: 2023-07-07

## 2023-07-22 ENCOUNTER — OUTPATIENT (OUTPATIENT)
Dept: OUTPATIENT SERVICES | Facility: HOSPITAL | Age: 48
LOS: 1 days | End: 2023-07-22
Payer: COMMERCIAL

## 2023-07-22 ENCOUNTER — RESULT REVIEW (OUTPATIENT)
Age: 48
End: 2023-07-22

## 2023-07-22 ENCOUNTER — APPOINTMENT (OUTPATIENT)
Dept: MRI IMAGING | Facility: HOSPITAL | Age: 48
End: 2023-07-22
Payer: COMMERCIAL

## 2023-07-22 DIAGNOSIS — Z87.59 PERSONAL HISTORY OF OTHER COMPLICATIONS OF PREGNANCY, CHILDBIRTH AND THE PUERPERIUM: Chronic | ICD-10-CM

## 2023-07-22 DIAGNOSIS — G35 MULTIPLE SCLEROSIS: ICD-10-CM

## 2023-07-22 PROCEDURE — A9579: CPT

## 2023-07-22 PROCEDURE — 70553 MRI BRAIN STEM W/O & W/DYE: CPT | Mod: 26

## 2023-07-22 PROCEDURE — 70553 MRI BRAIN STEM W/O & W/DYE: CPT

## 2023-07-25 ENCOUNTER — TRANSCRIPTION ENCOUNTER (OUTPATIENT)
Age: 48
End: 2023-07-25

## 2023-07-27 RX ORDER — DEXTROAMPHETAMINE SACCHARATE, AMPHETAMINE ASPARTATE MONOHYDRATE, DEXTROAMPHETAMINE SULFATE AND AMPHETAMINE SULFATE 2.5; 2.5; 2.5; 2.5 MG/1; MG/1; MG/1; MG/1
10 CAPSULE, EXTENDED RELEASE ORAL DAILY
Qty: 30 | Refills: 0 | Status: DISCONTINUED | COMMUNITY
Start: 2023-05-01 | End: 2023-07-27

## 2023-08-04 ENCOUNTER — APPOINTMENT (OUTPATIENT)
Dept: NEUROLOGY | Facility: CLINIC | Age: 48
End: 2023-08-04

## 2023-08-11 NOTE — HISTORY OF PRESENT ILLNESS
[FreeTextEntry1] : 7/7/23 Botox Follow Up Appointment:  Patient is status post botox on 7/7/23 Did not have any untoward effects from the injection- did not have any injection site issues and there was no negative effects from the medication.  Reports the following outcome from the injections: notes that spasticity and tightness is better int he distal calf but still gets tightness of behind the knee No new medical issues or hospitalizations since injection reported.  Working on getting the "cionic", neoprene sleeve estim device.  Patient presents with left lower extremity weakness, foot drop and genu recurvatum.  She has instability and is at risk for falls. Has AFO which she doesn't think is comfortable.   5/22/23 49 yo with h/o MS (diagnosed in June 2020- Primary Progressive per neuro note) Has been followed with another physician for botox injections into the LLE Last injected in over 3 months. Presents for botox. Has LLE tightness, difficulty walking.

## 2023-08-11 NOTE — PHYSICAL EXAM
[Normal] : Oriented to person, place, and time, insight and judgement were intact and the affect was normal [FreeTextEntry1] : Amb w/o AD- + revurvatum and some circumduction [de-identified] : no distress [de-identified] : well perfused [de-identified] : soft [de-identified] : Amb w/o AD- + L recurvatum [de-identified] : Motor nml grade throughout, No spasticity L APF, no clonus

## 2023-08-11 NOTE — ASSESSMENT
[FreeTextEntry1] : - Will do higher dose of botox - Will try cionic sleeve estim device for LLE - Refer for AFO- Patient has medical necessity for a left custom AFO at this time.  The orthosis must be custom as use will be for longer than six months.  Patient's current AFO is no longer sufficient as it is worn, prefabricated, and unable to address her genu recurvatum.  She remains at risk for falls without the custom AFO.

## 2023-08-23 ENCOUNTER — APPOINTMENT (OUTPATIENT)
Dept: NEUROLOGY | Facility: CLINIC | Age: 48
End: 2023-08-23

## 2023-09-06 LAB
25(OH)D3 SERPL-MCNC: 60.4 NG/ML
ALBUMIN SERPL ELPH-MCNC: 4.7 G/DL
ALP BLD-CCNC: 94 U/L
ALT SERPL-CCNC: 11 U/L
ANION GAP SERPL CALC-SCNC: 13 MMOL/L
AST SERPL-CCNC: 13 U/L
BILIRUB SERPL-MCNC: 0.2 MG/DL
BUN SERPL-MCNC: 13 MG/DL
CALCIUM SERPL-MCNC: 10.1 MG/DL
CHLORIDE SERPL-SCNC: 103 MMOL/L
CO2 SERPL-SCNC: 26 MMOL/L
CREAT SERPL-MCNC: 0.65 MG/DL
EGFR: 108 ML/MIN/1.73M2
GLUCOSE SERPL-MCNC: 109 MG/DL
POTASSIUM SERPL-SCNC: 4.5 MMOL/L
PROT SERPL-MCNC: 6.7 G/DL
SODIUM SERPL-SCNC: 143 MMOL/L

## 2023-09-07 ENCOUNTER — APPOINTMENT (OUTPATIENT)
Dept: NEUROLOGY | Facility: CLINIC | Age: 48
End: 2023-09-07
Payer: MEDICARE

## 2023-09-07 VITALS
HEART RATE: 87 BPM | SYSTOLIC BLOOD PRESSURE: 156 MMHG | WEIGHT: 107 LBS | HEIGHT: 62 IN | DIASTOLIC BLOOD PRESSURE: 95 MMHG | BODY MASS INDEX: 19.69 KG/M2

## 2023-09-07 PROCEDURE — 99215 OFFICE O/P EST HI 40 MIN: CPT

## 2023-09-07 NOTE — HISTORY OF PRESENT ILLNESS
[FreeTextEntry1] : INTERIM HX 09/07/2023: MRI brain w/w/o 7/2023 (compared w/ 4/28/2022) - stable, moderate demyelinating disease. labs 6/6/2023- Vitamin D 60, WBC 11.35k, ALC 2.03km LFT WNL. Here for follow-up with spouse Michael. L foot still weak. Also reports burning pain on R lateral thigh for past few months. Not alleviated by Tylenol, Advil, Voltaren gel, lidocaine patch. It is constant, not affected by position. No inciting factors. Reports her gait is still difficult. Feels like the L knee is locked and difficult to lift. Working with PT, wears brace and sleeve. Feels these devices are helping.  notes that L leg is cramped/stiff/straightened in the AM. Taking Ampyra and reports it is helpful, no side effects, stride and stamina better. No urinary/fecal incontinence. Increased urinary urgency. BM every 2-3 days. Notes if flexes neck, gets numbness in fingers. Only happens at night. No neck stiffness/pain. Also reports having chronic head and b/l hand tremor. Has not noticed any difference with alcohol. Tried taking primidone in past, gave her scary thoughts and poor mood. Adderall helps. Patient expresses feeling depressed about her condition and planning to see psychologist. Anxiety is also an issue. Tearful during visit.   HPI (initial visit May 01, 2023)- SUGAR PALMA is a 48 year old woman referred for hx of MS. She has been a patient of Dr. Mitchell (records available for review)- no longer accepting her insurance.   MS hx- Diagnosed in June 2020.  2018- Left foot started to drag, "left foot drop". Numbness in toes (noticed over time).  2019- Diagnosed with lumbar spondylosis, s/p L4/L5 laminectomy. No improvement in symptoms.  2020- Saw Dr Mitchell, recommended MRI brain and C/T spine- evidence of MS.  9/2020- Started Ocrevus, last infusion 3/15/2023.  "I do not recall any exacerbations". There has been a gradual weakness in left foot- continue to slowly progress. Never had optic neuritis. Saw Dr. 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. She has spasticity in legs, gets botox every 3 months- it helps. Spasms can be severe, gale in morning.  Last brain MRI 4/2022 and C/T spine 11/2022- stable per patient at [facility]. Amypra helped gait- but insurance denied.  No bladder issues, in past had recurrent UTI's

## 2023-09-07 NOTE — DATA REVIEWED
[de-identified] : MRI brain w/w/o 7/2023 (compared w/ 4/28/2022) - stable, moderate demyelinating disease.  Last brain MRI 4/2022 and C/T spine 11/2022- stable per patient at [facility]. (reviewed reports)- MRI brain 4/2022- stable disease burden compared to 9/2021. No active lesions MRI C and T spine 11/2022- stable c/w 9/2021. Diffuse/patchy lesions from C1/C2 to T2/T3, C5-T5/6 and scattered T7-T8-T10, ? T2 focal cord atrophy.

## 2023-09-07 NOTE — PHYSICAL EXAM
[FreeTextEntry1] : PHYSICAL EXAM Constitutional: Alert, no acute distress  Psychiatric: appropriate affect and mood Pulmonary: No respiratory distress, stable on room air  NEUROLOGICAL EXAM Mental status: The patient is alert, attentive and conversational memory intact. Speech/language: Mild dysarthria  Cranial nerves: CN II: ? RAPD OS CN III, IV, VI: EOMI, no nystagmus, no ptosis CN V: Facial sensation is intact to pinprick in all 3 divisions bilaterally. CN VII: Face is symmetric with normal eye closure and smile. CN VII: Hearing is normal to rubbing fingers CN IX, X: Palate elevates symmetrically. Phonation is normal. CN XI: Head turning and shoulder shrug are intact CN XII: Tongue is midline with normal movements and no atrophy. Motor: RUE 5/5. LUE 5/5, Except triceps 4/5 and hand  4+/5            RLE: 4+/5 HF, 5/5 KE/KF, 4+/5 DF and 5/5 PF             LLE: 3/5 HF, 5/5 KE, 2/5 DF, 4/5 PF Reflexes:                 R        L                  Biceps    3+       3+                  Patellar   3+       3+                  Achilles  2+       2+                  Plantar responses- Mute b/l Sensory: Sensations intact to LT UE and LE. Coordination: Dysmetria on FNF and HTS b/l (L>R). Head tremor and b/l action/postural hand tremors.  Gait/Stance: Wide based, ataxic gait. Truncal ataxia. Left leg circumduction with high steppage.    T25FW: 9/7/2023- 18.48 sec

## 2023-09-07 NOTE — ASSESSMENT
[FreeTextEntry1] : Assessment/Plan:  48 year old female w/ hx of progressive LLE weakness, ataxia and spasticity, diagnosed with multiple sclerosis in June 2020 and has been on Ocrevus since 9/2020. 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  Primary progressive MS- dx'd 6/2022, on Ocrevus since 9/2020.  Not active, ? progressing  Unclear if there has been any true clinical progression since start of Ocrevus, or if she experiences subjective worsening due to spasticity, fatigue and worsening mood.  Need to continue to monitor and treat her sympatomatically.   Plan:  1. Diagnostic Plan/Imaging: MRI brain and cervical spine w/o contrast 7/2024 for radiological stability (annual exam).  2. Disease Modifying therapy plan: Continue Ocrevus 600 mg q6 monthly infusions- due this month, will transfer auth to [facility].  Ocrevus labs every 6 months Mildly elevated WBC (11K), will have patient repeat in 1 month  3. Symptomatic therapy plan: () Fatigue: Continue Adderall 5 mg BID.  () Spasticity: On baclofen 20 mg TID. Continue to follow with Dr Hope. Will prescribe Tizanidine 2-4 mg BID PRN for R hip pain (reviewed side effects) () Neuropathic pain: On gabapentin 600 mg BID () Bladder Dysfunction: Will continue to monitor () Mobility: Continue PT. Continue Ampyra 10 mg BID.  () Anxiety/depression: sporadically uses xanax. On Cymbalta 60 mg QD. Recommend establishing care with psychiatrist and psychologist.  () Vitamin D3 and B12 supplementations    Return to clinic 6 months  The above plan was discussed with SUGAR PALMA in great detail.  SUGAR PALMA verbalized understanding and agrees with plan as detailed above. Patient was provided education and counselling on current diagnosis/symptoms. She was advised to call our clinic at [phone] for any new or worsening symptoms, or with any questions or concerns. In case of acute onset of neurological symptoms or worsening presentation, patient was advised to present to nearest emergency room for further evaluation. SUGAR PALMA expressed understanding and all her questions/concerns were addressed.  Tamara Tomlin M.D

## 2023-09-28 RX ORDER — OCRELIZUMAB 300 MG/10ML
300 INJECTION INTRAVENOUS
Qty: 2 | Refills: 1 | Status: ACTIVE | COMMUNITY
Start: 2023-09-28 | End: 1900-01-01

## 2023-10-03 ENCOUNTER — APPOINTMENT (OUTPATIENT)
Dept: PHYSICAL MEDICINE AND REHAB | Facility: CLINIC | Age: 48
End: 2023-10-03

## 2023-10-20 ENCOUNTER — APPOINTMENT (OUTPATIENT)
Dept: PHYSICAL MEDICINE AND REHAB | Facility: CLINIC | Age: 48
End: 2023-10-20
Payer: MEDICARE

## 2023-10-20 VITALS — OXYGEN SATURATION: 98 % | DIASTOLIC BLOOD PRESSURE: 86 MMHG | HEART RATE: 77 BPM | SYSTOLIC BLOOD PRESSURE: 132 MMHG

## 2023-10-20 PROCEDURE — 95874 GUIDE NERV DESTR NEEDLE EMG: CPT

## 2023-10-20 PROCEDURE — 99213 OFFICE O/P EST LOW 20 MIN: CPT | Mod: 25

## 2023-10-20 PROCEDURE — 64642 CHEMODENERV 1 EXTREMITY 1-4: CPT

## 2023-10-27 ENCOUNTER — LABORATORY RESULT (OUTPATIENT)
Age: 48
End: 2023-10-27

## 2023-10-30 ENCOUNTER — TRANSCRIPTION ENCOUNTER (OUTPATIENT)
Age: 48
End: 2023-10-30

## 2023-10-30 LAB
ALBUMIN SERPL ELPH-MCNC: 4.7 G/DL
ALP BLD-CCNC: 100 U/L
ALT SERPL-CCNC: 13 U/L
ANION GAP SERPL CALC-SCNC: 13 MMOL/L
AST SERPL-CCNC: 14 U/L
BASOPHILS # BLD AUTO: 0.06 K/UL
BASOPHILS NFR BLD AUTO: 0.6 %
BILIRUB SERPL-MCNC: 0.2 MG/DL
BUN SERPL-MCNC: 14 MG/DL
CALCIUM SERPL-MCNC: 9.7 MG/DL
CHLORIDE SERPL-SCNC: 102 MMOL/L
CO2 SERPL-SCNC: 26 MMOL/L
CREAT SERPL-MCNC: 0.65 MG/DL
DEPRECATED KAPPA LC FREE/LAMBDA SER: 1.34 RATIO
EGFR: 108 ML/MIN/1.73M2
EOSINOPHIL # BLD AUTO: 0.69 K/UL
EOSINOPHIL NFR BLD AUTO: 7.1 %
GLUCOSE SERPL-MCNC: 93 MG/DL
HBV CORE IGG+IGM SER QL: NONREACTIVE
HBV CORE IGM SER QL: NONREACTIVE
HBV SURFACE AB SER QL: NONREACTIVE
HBV SURFACE AG SER QL: NONREACTIVE
HCT VFR BLD CALC: 42.5 %
HGB BLD-MCNC: 14 G/DL
IGA SER QL IEP: 145 MG/DL
IGG SER QL IEP: 614 MG/DL
IGM SER QL IEP: 117 MG/DL
IMM GRANULOCYTES NFR BLD AUTO: 0.2 %
KAPPA LC CSF-MCNC: 1.12 MG/DL
KAPPA LC SERPL-MCNC: 1.5 MG/DL
LYMPHOCYTES # BLD AUTO: 2.41 K/UL
LYMPHOCYTES NFR BLD AUTO: 24.9 %
MAN DIFF?: NORMAL
MCHC RBC-ENTMCNC: 31.8 PG
MCHC RBC-ENTMCNC: 32.9 GM/DL
MCV RBC AUTO: 96.6 FL
MONOCYTES # BLD AUTO: 0.84 K/UL
MONOCYTES NFR BLD AUTO: 8.7 %
NEUTROPHILS # BLD AUTO: 5.66 K/UL
NEUTROPHILS NFR BLD AUTO: 58.5 %
PLATELET # BLD AUTO: 333 K/UL
POTASSIUM SERPL-SCNC: 4.3 MMOL/L
PROT SERPL-MCNC: 7.2 G/DL
RBC # BLD: 4.4 M/UL
RBC # FLD: 12.3 %
SODIUM SERPL-SCNC: 140 MMOL/L
VZV AB TITR SER: NEGATIVE
VZV IGG SER IF-ACNC: 20.7 INDEX
VZV IGM SER IF-ACNC: <0.91 INDEX
WBC # FLD AUTO: 9.68 K/UL

## 2023-10-31 LAB
M TB IFN-G BLD-IMP: NEGATIVE
QUANTIFERON TB PLUS MITOGEN MINUS NIL: 3.47 IU/ML
QUANTIFERON TB PLUS NIL: 0.04 IU/ML
QUANTIFERON TB PLUS TB1 MINUS NIL: -0.01 IU/ML
QUANTIFERON TB PLUS TB2 MINUS NIL: -0.01 IU/ML

## 2023-11-07 ENCOUNTER — APPOINTMENT (OUTPATIENT)
Dept: NEUROLOGY | Facility: CLINIC | Age: 48
End: 2023-11-07
Payer: MEDICARE

## 2023-11-07 DIAGNOSIS — R39.15 URGENCY OF URINATION: ICD-10-CM

## 2023-11-07 PROCEDURE — 99214 OFFICE O/P EST MOD 30 MIN: CPT | Mod: 95

## 2023-11-07 RX ORDER — GABAPENTIN 600 MG/1
600 TABLET, COATED ORAL
Qty: 180 | Refills: 3 | Status: ACTIVE | COMMUNITY
Start: 2023-03-22 | End: 1900-01-01

## 2023-11-09 LAB
JCV INDEX: 0.14
STRATIFY JCV ANTIBODY: NEGATIVE

## 2023-11-16 ENCOUNTER — APPOINTMENT (OUTPATIENT)
Dept: PHYSICAL MEDICINE AND REHAB | Facility: CLINIC | Age: 48
End: 2023-11-16
Payer: MEDICARE

## 2023-11-16 PROCEDURE — 99214 OFFICE O/P EST MOD 30 MIN: CPT | Mod: 25

## 2023-11-16 PROCEDURE — 20553 NJX 1/MLT TRIGGER POINTS 3/>: CPT

## 2023-11-16 RX ORDER — ALPRAZOLAM 0.25 MG/1
0.25 TABLET ORAL
Qty: 2 | Refills: 0 | Status: ACTIVE | COMMUNITY
Start: 2023-11-16 | End: 1900-01-01

## 2023-11-24 ENCOUNTER — APPOINTMENT (OUTPATIENT)
Dept: NEUROLOGY | Facility: CLINIC | Age: 48
End: 2023-11-24
Payer: MEDICARE

## 2023-11-24 PROCEDURE — 99214 OFFICE O/P EST MOD 30 MIN: CPT | Mod: 95

## 2023-11-24 RX ORDER — METHYLPREDNISOLONE 4 MG/1
4 TABLET ORAL
Qty: 1 | Refills: 0 | Status: ACTIVE | COMMUNITY
Start: 2023-11-24 | End: 1900-01-01

## 2023-11-28 ENCOUNTER — APPOINTMENT (OUTPATIENT)
Dept: PHYSICAL MEDICINE AND REHAB | Facility: CLINIC | Age: 48
End: 2023-11-28
Payer: COMMERCIAL

## 2023-11-28 ENCOUNTER — OUTPATIENT (OUTPATIENT)
Dept: OUTPATIENT SERVICES | Facility: HOSPITAL | Age: 48
LOS: 1 days | End: 2023-11-28
Payer: MEDICARE

## 2023-11-28 DIAGNOSIS — Z87.59 PERSONAL HISTORY OF OTHER COMPLICATIONS OF PREGNANCY, CHILDBIRTH AND THE PUERPERIUM: Chronic | ICD-10-CM

## 2023-11-28 DIAGNOSIS — M54.16 RADICULOPATHY, LUMBAR REGION: ICD-10-CM

## 2023-11-28 PROCEDURE — 62323 NJX INTERLAMINAR LMBR/SAC: CPT

## 2023-11-30 DIAGNOSIS — M54.18 RADICULOPATHY, SACRAL AND SACROCOCCYGEAL REGION: ICD-10-CM

## 2023-12-02 ENCOUNTER — APPOINTMENT (OUTPATIENT)
Dept: MRI IMAGING | Facility: HOSPITAL | Age: 48
End: 2023-12-02
Payer: MEDICARE

## 2023-12-02 ENCOUNTER — OUTPATIENT (OUTPATIENT)
Dept: OUTPATIENT SERVICES | Facility: HOSPITAL | Age: 48
LOS: 1 days | End: 2023-12-02
Payer: MEDICARE

## 2023-12-02 DIAGNOSIS — M96.1 POSTLAMINECTOMY SYNDROME, NOT ELSEWHERE CLASSIFIED: ICD-10-CM

## 2023-12-02 DIAGNOSIS — Z87.59 PERSONAL HISTORY OF OTHER COMPLICATIONS OF PREGNANCY, CHILDBIRTH AND THE PUERPERIUM: Chronic | ICD-10-CM

## 2023-12-02 PROCEDURE — 72158 MRI LUMBAR SPINE W/O & W/DYE: CPT | Mod: 26,MH

## 2023-12-02 PROCEDURE — 72158 MRI LUMBAR SPINE W/O & W/DYE: CPT

## 2023-12-02 PROCEDURE — A9579: CPT

## 2023-12-08 ENCOUNTER — APPOINTMENT (OUTPATIENT)
Dept: PHYSICAL MEDICINE AND REHAB | Facility: CLINIC | Age: 48
End: 2023-12-08
Payer: MEDICARE

## 2023-12-08 PROCEDURE — 99213 OFFICE O/P EST LOW 20 MIN: CPT

## 2023-12-08 RX ORDER — ONABOTULINUMTOXINA 100 [USP'U]/1
100 INJECTION, POWDER, LYOPHILIZED, FOR SOLUTION INTRADERMAL; INTRAMUSCULAR
Qty: 2 | Refills: 0 | Status: ACTIVE | OUTPATIENT
Start: 2023-12-08

## 2023-12-12 ENCOUNTER — APPOINTMENT (OUTPATIENT)
Dept: PHYSICAL MEDICINE AND REHAB | Facility: CLINIC | Age: 48
End: 2023-12-12
Payer: MEDICARE

## 2023-12-12 ENCOUNTER — NON-APPOINTMENT (OUTPATIENT)
Age: 48
End: 2023-12-12

## 2023-12-12 PROCEDURE — 99213 OFFICE O/P EST LOW 20 MIN: CPT

## 2023-12-12 RX ORDER — METHYLPREDNISOLONE 4 MG/1
4 TABLET ORAL
Qty: 1 | Refills: 0 | Status: ACTIVE | COMMUNITY
Start: 2023-12-12 | End: 1900-01-01

## 2023-12-15 ENCOUNTER — RX RENEWAL (OUTPATIENT)
Age: 48
End: 2023-12-15

## 2024-01-25 ENCOUNTER — APPOINTMENT (OUTPATIENT)
Dept: PHYSICAL MEDICINE AND REHAB | Facility: CLINIC | Age: 49
End: 2024-01-25
Payer: MEDICARE

## 2024-01-25 PROCEDURE — 20553 NJX 1/MLT TRIGGER POINTS 3/>: CPT | Mod: JZ

## 2024-01-25 RX ORDER — ALPRAZOLAM 0.25 MG/1
0.25 TABLET ORAL
Qty: 2 | Refills: 0 | Status: ACTIVE | COMMUNITY
Start: 2024-01-25 | End: 1900-01-01

## 2024-01-30 ENCOUNTER — APPOINTMENT (OUTPATIENT)
Dept: PHYSICAL MEDICINE AND REHAB | Facility: CLINIC | Age: 49
End: 2024-01-30
Payer: MEDICARE

## 2024-01-30 PROCEDURE — 95874 GUIDE NERV DESTR NEEDLE EMG: CPT

## 2024-01-30 PROCEDURE — 64642 CHEMODENERV 1 EXTREMITY 1-4: CPT

## 2024-01-30 NOTE — PROCEDURE
[Consent] : consent was given by patient or guardian [Site Verification] : the injection site was verified [Post-Injection Instructions Provided] : post-injection instructions were provided [] : EMG Guidance was used during the procedure [TWNoteComboBox1] : Semitendinosus [TWNoteComboBox2] : 100 Units [de-identified] : Medial Gastrocnemius [de-identified] : 150 Units [de-identified] : Lateral Gastrocnemius [de-identified] : 100 Units [de-identified] : Soleus [de-identified] : 50 Units

## 2024-01-30 NOTE — ASSESSMENT
[FreeTextEntry1] : - Post-injection instructions provided. Patient informed that should take 10-14 days prior to seeing improvement. Will notify me if there is any redness or discharge noted at injection sites. Ice and/or tyelenol prn muscle soreness. - Continue current therapies.  - Follow up in 6 weeks.

## 2024-01-30 NOTE — PHYSICAL EXAM
[Normal] : Oriented to person, place, and time, insight and judgement were intact and the affect was normal [de-identified] : no distress [de-identified] : well perfused [de-identified] : soft [de-identified] : Amb w/o AD- + L recurvatum [de-identified] : Motor nml grade throughout,  MAS 1+ spasticity

## 2024-01-30 NOTE — HISTORY OF PRESENT ILLNESS
[FreeTextEntry1] : Patient presents for botox injection. No new medical issues.  Feels botox has worn off.

## 2024-02-02 ENCOUNTER — OUTPATIENT (OUTPATIENT)
Dept: OUTPATIENT SERVICES | Facility: HOSPITAL | Age: 49
LOS: 1 days | End: 2024-02-02
Payer: MEDICARE

## 2024-02-02 ENCOUNTER — APPOINTMENT (OUTPATIENT)
Dept: PHYSICAL MEDICINE AND REHAB | Facility: CLINIC | Age: 49
End: 2024-02-02
Payer: MEDICARE

## 2024-02-02 DIAGNOSIS — Z87.59 PERSONAL HISTORY OF OTHER COMPLICATIONS OF PREGNANCY, CHILDBIRTH AND THE PUERPERIUM: Chronic | ICD-10-CM

## 2024-02-02 DIAGNOSIS — M54.16 RADICULOPATHY, LUMBAR REGION: ICD-10-CM

## 2024-02-02 PROCEDURE — 62323 NJX INTERLAMINAR LMBR/SAC: CPT

## 2024-02-02 NOTE — PROCEDURE
[de-identified] : [facility]  [facility]  [place], [place], [address] - [phone]  PATIENT: SUGAR PALMA  MEDICAL RECORD: [id number]  DATE OF PROCEDURE: 2024   CAUDAL EPIDURAL STEROID INJECTION WITH FLUOROSCOPIC GUIDANCE   Injectate: 80mg/mL methylPrednisolone and 0.25% Bupivacaine 1mL and 3mL Normal Saline Preservative Free  Complications: None  Estimated Blood Loss: None  Technique: After informed consent was obtained, the patient was taken to the operating room and placed in the prone position. All pressure points were supported and this procedure was done under local anesthesia. A time-out procedure was performed and patient's name, , MRN and procedure being performed along with pertinent medical history was verified. The lumbosacral region was then exposed and prepped with chlorhexidine and draped in a sterile manner. The entirety of the procedure was done under sterile technique using sterile gloves, surgical mask and cap. All medication expiration dates were verified prior to being drawn into syringes.    Using first AP, then lateral fluoroscopy, the sacral hiatus was identified. Following this, 3 mL of preservative free 1% lidocaine was injected with a 25-gauge 1-1/2 inch needle for a skin wheal. Through that skin wheal, a 20-gauge 6-inch tuohy epidural needle was advanced to bony contact of the sacral hiatus under intermittent fluoroscopy, then advanced through sacrococcygeal ligament into the caudal epidural space. After negative aspiration of heme or CSF, 1 mL of Omnipaque 240 contrast was injected to delineate epidural spread under fluoroscopic view. Following this, again after negative aspiration for heme and CSF, the entirety of the above mentioned injectate was easily injected to the caudal epidural space without paresthesias or complications.    The patient was taken to recovery room in stable condition with bilateral lower extremity motor and sensation intact.

## 2024-02-05 DIAGNOSIS — M54.18 RADICULOPATHY, SACRAL AND SACROCOCCYGEAL REGION: ICD-10-CM

## 2024-02-27 ENCOUNTER — APPOINTMENT (OUTPATIENT)
Dept: PHYSICAL MEDICINE AND REHAB | Facility: CLINIC | Age: 49
End: 2024-02-27
Payer: MEDICARE

## 2024-02-27 PROCEDURE — 99214 OFFICE O/P EST MOD 30 MIN: CPT

## 2024-02-27 NOTE — HISTORY OF PRESENT ILLNESS
[FreeTextEntry1] : SUGAR PALMA had CAUDAL EPIDURAL STEROID INJECTION WITH FLUOROSCOPIC GUIDANCE on 2/2/24. Today patient is here for follow up with her . Patient reports greater than 20% reduction in pain as tested by the NRS pain scale (Pain went down from 10/10 on NRS prior to injection). Patient also reports improvement in quality of life. In addition, patient reports improvement of function and ADLs along with a temporary decrease in pain medication use. Pain is 6/10 on NRS at this time. She is feeling it more on the right side of the lower back and down the right buttocks and leg.   Denies any new pain, numbness or weakness, bowel/bladder dysfunction, saddle anesthesia, fevers, chills, weight loss, night pain, or night sweats at this time.

## 2024-02-27 NOTE — ASSESSMENT
[FreeTextEntry1] : 47 yo F with history of MS, chronic back pain with post-laminectomy syndrome and myofascial pain syndrome with continued pains. Now s/p caudal with partial relief.  Patient reassured and educated on the diagnosis and treatment options. Risks and benefits of treatment and of delaying treatment discussed with patient. Risks discussed include but not limited to: progression of symptoms, worsening pain and functional status, etc.  This note was generated using Dragon medical dictation software. A reasonable effort had been made for proofreading its contents, but spelling mistakes or grammatical errors may still remain. If there are any questions or points of clarification needed please notify my office.  Advising to hold Cymbalta and starting on Elavil   Start Elavil 10mg PO qHS PRN pain, dispense #30. Rx sent. Patient warned about the sedative nature of this medication and recommended not to drive or to handle heavy machinery.. Nevertheless, risks and benefits of taking TCAs was discussed in detail. Patient cautioned to discontinue if he develops chest pains, palpitations, confusion, depression, suicidal/homicidal ideations, sedation, urinary or fecal difficulties, etc.  Explained treatment plan to patient in detail. Will try Elavil and will titrate as tolerated. If cannot tolerate side effects will change to Pamelor and titrate as tolerated.  Discussed SCS but patient would like to hold off. She is also petite and it might difficult to do a permanent implant for her Follow up routinely with Dr. Hope for botox  Follow up 4 weeks  Patient was advised if the following symptoms develop: chills, fever, loss of bladder control, bowel incontinence or urinary retention, numbness/tingling or weakness is present in upper or lower extremities, to go to the nearest emergency room. This may be a new clinical condition not present at the time of the patient visit that may lead to paralysis and/or death. Patient advised if the above symptoms developed to also call the office immediately to inform us and to go to the nearest emergency room.

## 2024-02-27 NOTE — PHYSICAL EXAM
[FreeTextEntry1] : General exam   Constitutional: The patient appears well-developed, well-nourished, and in no apparent distress. Patient is well-groomed.    Skin: The skin is warm and dry, with normal turgor.  Eyes: PERRL.    ENMT: Ears: Hearing is grossly within normal limits.    Neck: Supple: The neck is supple.    Respiratory: Inspection: Breathing unlabored.    Neurologic: Alert and oriented x 3.   Psychiatric: Patient is cooperative and appropriate.  Mood and affect are normal.  Patient's insight is good, and memory and judgment are intact.  Lumbar Skin c/d/i without any erythema, swelling, effusion  + Right paraspinal and lumbar tenderness SLR + Antalgic, slow spastic gait

## 2024-03-15 ENCOUNTER — APPOINTMENT (OUTPATIENT)
Dept: NEUROLOGY | Facility: CLINIC | Age: 49
End: 2024-03-15
Payer: MEDICARE

## 2024-03-15 VITALS
HEIGHT: 62 IN | DIASTOLIC BLOOD PRESSURE: 83 MMHG | BODY MASS INDEX: 18.95 KG/M2 | WEIGHT: 103 LBS | SYSTOLIC BLOOD PRESSURE: 122 MMHG | HEART RATE: 111 BPM

## 2024-03-15 PROCEDURE — 99215 OFFICE O/P EST HI 40 MIN: CPT

## 2024-03-15 RX ORDER — DULOXETINE HYDROCHLORIDE 60 MG/1
60 CAPSULE, DELAYED RELEASE PELLETS ORAL
Qty: 90 | Refills: 0 | Status: DISCONTINUED | COMMUNITY
Start: 2023-02-02 | End: 2024-03-15

## 2024-03-15 RX ORDER — DEXTROAMPHETAMINE SACCHARATE, AMPHETAMINE ASPARTATE MONOHYDRATE, DEXTROAMPHETAMINE SULFATE AND AMPHETAMINE SULFATE 2.5; 2.5; 2.5; 2.5 MG/1; MG/1; MG/1; MG/1
10 CAPSULE, EXTENDED RELEASE ORAL DAILY
Qty: 30 | Refills: 0 | Status: DISCONTINUED | COMMUNITY
Start: 2023-11-07 | End: 2024-03-15

## 2024-03-15 NOTE — PHYSICAL EXAM
[FreeTextEntry1] : PHYSICAL EXAM Constitutional: Alert, no acute distress Psychiatric: appropriate affect and mood Pulmonary: No respiratory distress, stable on room air  NEUROLOGICAL EXAM Mental status: The patient is alert, attentive and conversational memory intact. Speech/language: Mild dysarthria Cranial nerves: CN II: ? RAPD OS CN III, IV, VI: EOMI, no nystagmus, no ptosis CN V: Facial sensation is intact to pinprick in all 3 divisions bilaterally. CN VII: Face is symmetric with normal eye closure and smile. CN VII: Hearing is normal to rubbing fingers CN IX, X: Palate elevates symmetrically. Phonation is normal. CN XI: Head turning and shoulder shrug are intact CN XII: Tongue is midline with normal movements and no atrophy. Motor: RUE 5/5. LUE 5/5, Except triceps 4/5 and hand  4+/5  RLE: 4+/5 HF, 5/5 KE/KF, 4+/5 DF and 4+/5 PF  LLE: 3/5 HF, 5/5 KE, 2/5 DF, 4/5 PF Reflexes: R L  Biceps 3+ 3+  Patellar 3+ 3+  Achilles 2+ 2+  Plantar responses- Mute b/l Sensory: Sensations intact to LT UE and LE. Coordination: Dysmetria on FNF and HTS b/l (L>R). Head tremor and b/l action/postural hand tremors. Gait/Stance: Wide based, ataxic gait. Truncal ataxia. Left leg circumduction with high steppage. Walks along the wall for support.    T25FW: 9/7/2023- 18.48 sec.

## 2024-03-15 NOTE — HISTORY OF PRESENT ILLNESS
[FreeTextEntry1] : INTERIM HX 03/15/2024: Last botox injection LLE with Dr Hope 1/30/2024, she gets it [id number]. Also gets trigger point injections in back and LESI. R leg sciatica. She is in PT.  Stopped cymbalta. started on amitriptyline 25 mg QHS.  Has fallen in the house. LLE feels heavier. Also experiencing more urinary issues-frequent urination. She denies burning.  Due for ocrevus next month.  Had MR L spine 12/2023- L4/5 moderate to severe left neural foraminal narrowing and moderate right neural foraminal narrowing, no significant canal narrowing.   INTERIM HX 11/24/2023: [This is a telehealth 2-way video visit ] "weird" sensation by L knee, like a tingly sensation, "pulls me back". + Muscle cramps in calf muscles. "Feels like a locked knee". L radicular pains. hx of lumbar laminectomy in 2019.  Tylenol and advil not helping.  Symptoms worse over the last 2 weeks.  Had trigger point injections in back 11/16. Dr Anna recommend MRI L spine for radiculopathy and plan for LESI next week.  INTERIM HX 11/07/2023: [This is a telehealth 2-way video visit ] On Adderall 5 mg BID IR (@ 8 am and 1 pm)- not effective, no s/e. By 3-4 o'clock "i am done". In the past was on XR, worked better.  Experiencing more urinary urgency in the last few weeks, no incontinence. no dysuria. last UTI was 5 years ago.  INTERIM HX 09/07/2023: MRI brain w/w/o 7/2023 (compared w/ 4/28/2022) - stable, moderate demyelinating disease. labs 6/6/2023- Vitamin D 60, WBC 11.35k, ALC 2.03km LFT WNL. Here for follow-up with spouse Michael. L foot still weak. Also reports burning pain on R lateral thigh for past few months. Not alleviated by Tylenol, Advil, Voltaren gel, lidocaine patch. It is constant, not affected by position. No inciting factors. Reports her gait is still difficult. Feels like the L knee is locked and difficult to lift. Working with PT, wears brace and sleeve. Feels these devices are helping.  notes that L leg is cramped/stiff/straightened in the AM. Taking Ampyra and reports it is helpful, no side effects, stride and stamina better. No urinary/fecal incontinence. Increased urinary urgency. BM every 2-3 days. Notes if flexes neck, gets numbness in fingers. Only happens at night. No neck stiffness/pain. Also reports having chronic head and b/l hand tremor. Has not noticed any difference with alcohol. Tried taking primidone in past, gave her scary thoughts and poor mood. Adderall helps. Patient expresses feeling depressed about her condition and planning to see psychologist. Anxiety is also an issue. Tearful during visit.   HPI (initial visit May 01, 2023)- SUGAR PALMA is a 48 year old woman referred for hx of MS. She has been a patient of Dr. Mitchell (records available for review)- no longer accepting her insurance.   MS hx- Diagnosed in June 2020.  2018- Left foot started to drag, "left foot drop". Numbness in toes (noticed over time).  2019- Diagnosed with lumbar spondylosis, s/p L4/L5 laminectomy. No improvement in symptoms.  2020- Saw Dr Mitchell, recommended MRI brain and C/T spine- evidence of MS.  9/2020- Started Ocrevus, last infusion 3/15/2023.  "I do not recall any exacerbations". There has been a gradual weakness in left foot- continue to slowly progress. Never had optic neuritis. Saw Dr. 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. She has spasticity in legs, gets botox every 3 months- it helps. Spasms can be severe, gale in morning.  Last brain MRI 4/2022 and C/T spine 11/2022- stable per patient at [facility]. Amypra helped gait- but insurance denied.  No bladder issues, in past had recurrent UTI's

## 2024-03-15 NOTE — DATA REVIEWED
[de-identified] : MRI brain w/w/o 7/2023 (compared w/ 4/28/2022) - stable, moderate demyelinating disease.  Last brain MRI 4/2022 and C/T spine 11/2022- stable per patient at [facility]. (reviewed reports)- MRI brain 4/2022- stable disease burden compared to 9/2021. No active lesions MRI C and T spine 11/2022- stable c/w 9/2021. Diffuse/patchy lesions from C1/C2 to T2/T3, C5-T5/6 and scattered T7-T8-T10, ? T2 focal cord atrophy.

## 2024-03-15 NOTE — ASSESSMENT
[FreeTextEntry1] : Assessment/Plan:  48 year old female w/ hx of progressive LLE weakness, ataxia and spasticity, diagnosed with multiple sclerosis in June 2020 and has been on Ocrevus since 9/2020. 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  Primary progressive MS- dx'd 6/2022, on Ocrevus since 9/2020. Not active, ? progressing  Unclear if there has been any true clinical progression since start of Ocrevus, or if she experiences subjective worsening due to spasticity, fatigue, chronic pain and worsening mood. Need to continue to monitor and treat her symptomatically.  Pt reports worsening LLE heaviness and persistent RLE sciatica. Also complaining of more urinary complaints. Neurological exam stable. I would recommend UA to rule out UTI. I also recommend urology referral for continued management of neurogenic bladder. Also recommend continued pain management.   Plan: 1. Diagnostic Plan/Imaging:  - MRI brain and cervical/thoracic spine w/o contrast 7/2024 for radiological stability (annual exam). - UA and culture to rule out UTI  2. Disease Modifying therapy plan: Continue Ocrevus 600 mg q6 monthly infusions Ocrevus labs every 6 months (labs ordered)   3. Symptomatic therapy plan: () Fatigue: Continue Adderall - Increase from 5 to 10 mg IR BID. (insurance has not approved XR) () Spasticity: On baclofen 20 mg TID. Continue to follow with Dr Hope. Tizanidine 2-4 mg BID (can take scheduled). () Neuropathic pain: On gabapentin 600 mg BID () Bladder Dysfunction: + urgency. Check UA/culture to rule out UTI. Recommend urology referral. () Mobility: Continue PT. Continue Ampyra 10 mg BID. () Anxiety/depression: sporadically uses xanax. On amitriptyline 25 mg qhs. Recommend establishing care with psychiatrist and psychologist. () Vitamin D3 and B12 supplementations  4. Lumbar radiculopathy (R>L)- continue f/u with PM&R. s/p LESI.  Advised patient can take Tizanidine 4 mg BID PRN as needed.   Return to clinic 4 months  The above plan was discussed with SUGAR PALMA in great detail. SUGAR PALMA verbalized understanding and agrees with plan as detailed above. Patient was provided education and counselling on current diagnosis/symptoms. She was advised to call our clinic at [phone] for any new or worsening symptoms, or with any questions or concerns. SUGAR PALMA expressed understanding and all her questions/concerns were addressed.  Tamara Tomlin M.D.

## 2024-03-18 ENCOUNTER — APPOINTMENT (OUTPATIENT)
Dept: PHYSICAL MEDICINE AND REHAB | Facility: CLINIC | Age: 49
End: 2024-03-18
Payer: MEDICARE

## 2024-03-18 PROCEDURE — 99442: CPT | Mod: 93

## 2024-03-18 RX ORDER — ONABOTULINUMTOXINA 200 [USP'U]/1
200 INJECTION, POWDER, LYOPHILIZED, FOR SOLUTION INTRADERMAL; INTRAMUSCULAR
Qty: 3 | Refills: 0 | Status: ACTIVE | OUTPATIENT
Start: 2024-03-18

## 2024-03-18 NOTE — HISTORY OF PRESENT ILLNESS
[FreeTextEntry1] : Telehealth: Patient evaluated via telehealth.  Botox Follow Up Appointment:  Patient is status post botox on 1/30/24 Did not have any untoward effects from the injection- did not have any injection site issues and there was no negative effects from the medication.  Reports the following outcome from the injections: Feels that leg is looser/ more comfortable after the injection. Feels that it is starting to wear off.   No new medical issues or hospitalizations since injection reported.  Continues with therapies.   - Will f/u in May for repeat injection.

## 2024-03-28 ENCOUNTER — APPOINTMENT (OUTPATIENT)
Dept: PHYSICAL MEDICINE AND REHAB | Facility: CLINIC | Age: 49
End: 2024-03-28
Payer: MEDICARE

## 2024-03-28 DIAGNOSIS — R25.2 CRAMP AND SPASM: ICD-10-CM

## 2024-03-28 PROCEDURE — 99214 OFFICE O/P EST MOD 30 MIN: CPT | Mod: 25

## 2024-03-28 PROCEDURE — 20553 NJX 1/MLT TRIGGER POINTS 3/>: CPT

## 2024-03-28 RX ORDER — TRAMADOL HYDROCHLORIDE 50 MG/1
50 TABLET, COATED ORAL
Qty: 14 | Refills: 0 | Status: ACTIVE | COMMUNITY
Start: 2024-03-28 | End: 1900-01-01

## 2024-03-29 NOTE — HISTORY OF PRESENT ILLNESS
[FreeTextEntry1] : SUGAR PALMA is here for follow up with her . Since last visit she had started Elavil and does not yet notice any difference. Pain continues to be 10/10 on NRS. It is very debilitating and is over the neck and lower back. It also radiates into the legs. She had seen Neurology Dr. Tomlin on 3/15/24: "1. Diagnostic Plan/Imaging: - MRI brain and cervical/thoracic spine w/o contrast 7/2024 for radiological stability (annual exam). - UA and culture to rule out UTI .... 4. Lumbar radiculopathy (R>L)- continue f/u with PM&R. s/p LESI. Advised patient can take Tizanidine 4 mg BID PRN as needed."  She is wondering what can be done for her pain next.  She would also like to repeat the epidural to help with both side of her back and leg pains.   Denies any new pain, numbness or weakness, bowel/bladder dysfunction, saddle anesthesia, fevers, chills, weight loss, night pain, or night sweats at this time.

## 2024-03-29 NOTE — ASSESSMENT
[FreeTextEntry1] : 49 yo F with history of MS, chronic back pain with post-laminectomy syndrome and myofascial pain syndrome and spasticity with continued pains. Now s/p caudal with partial relief in the past.  Patient reassured and educated on the diagnosis and treatment options. Risks and benefits of treatment and of delaying treatment discussed with patient. Risks discussed include but not limited to: progression of symptoms, worsening pain and functional status, etc.  This note was generated using Dragon medical dictation software. A reasonable effort had been made for proofreading its contents, but spelling mistakes or grammatical errors may still remain. If there are any questions or points of clarification needed please notify my office.  Continue Elavil 25mg PO qHS PRN pain, dispense #30. Rx sent. Patient warned about the sedative nature of this medication and recommended not to drive or to handle heavy machinery.. Nevertheless, risks and benefits of taking TCAs was discussed in detail. Patient cautioned to discontinue if he develops chest pains, palpitations, confusion, depression, suicidal/homicidal ideations, sedation, urinary or fecal difficulties, etc.  Explained treatment plan to patient in detail. Will try Elavil and will titrate as tolerated. If cannot tolerate side effects will change to Pamelor and titrate as tolerated.  I Stop was reviewed and patient is consistent with history presented. If patient requires more, they will need to come in for UDT and sign opiate contract. Tramadol 50mg PO q12 PRN severe 10/10 pain on NRS, 7 day supply #14 tablets.  Discussed SCS but patient would like to hold off. Referring to Dr. Garcia from anesthesia pain management for consideration of pain/spasticity pump trial Follow up routinely with Dr. Hope for botox  Patient had tried and failed conservative treatment. This includes but is not limited to: Acetaminophen, NSAIDs, muscle relaxants, neuropathic medications, physician directed home exercises and/or physical therapy for at least 8 weeks. After a thorough discussion of risks and benefits patient would like to proceed with LUMBAR TRANSFORAMINAL EPIDURAL STEROID INJECTION AT BILATERAL L5-S1 WITH FLUOROSCOPIC GUIDANCE  Risks and benefits of having injection discussed with patient. Risks discussed included, but not limited to: pain, infection, bleeding requiring emergency surgery, headaches, damage to vital organs, etc.  At this time, patient appears to be psychologically stable. Based on the history, physical exam and diagnostic findings, patient will benefit from this procedure. The goal of this procedure is to reduce pain and inflammation, improve function and range of motion and to further promote increased activity and return to previous level of functioning. The ultimate goal of performing this procedure is to improve patient's quality of life.  Asking for authorization for LUMBAR TRANSFORAMINAL EPIDURAL STEROID INJECTION AT BILATERAL L5-S1 WITH FLUOROSCOPIC GUIDANCE to help treat patients pain.  Patient will be scheduled for this procedure.  Patient was advised if the following symptoms develop: chills, fever, loss of bladder control, bowel incontinence or urinary retention, numbness/tingling or weakness is present in upper or lower extremities, to go to the nearest emergency room. This may be a new clinical condition not present at the time of the patient visit that may lead to paralysis and/or death. Patient advised if the above symptoms developed to also call the office immediately to inform us and to go to the nearest emergency room.

## 2024-03-29 NOTE — PROCEDURE
[de-identified] : Trigger Point Injection  Area injected: LEFT cervical and BILATERAL lumbar paraspinals  Injectate: 30mg/mL Ketorolac 2mL and 0.25% Bupivacaine 8mL  Complications: None  Estimated Blood Loss: None  The procedure risks, hazards and alternatives were discussed with the patient and a proper verbal consent was obtained. The area over the myofascial spasm was prepped with chloraprep utilizing sterile technique. After isolating the area between two palpating fingertips a 25-gauge 1.5" needle was placed in the center of the myofascial spasms and negative aspiration was performed. Then 3-4ml of injectate was injected into each trigger point over 3 trigger points. The patient tolerated the procedure well without any apparent difficulties or complications. Sterile bandaid was applied and patient was instructed on post-injection care, including when to remove the bandaid.

## 2024-03-29 NOTE — PHYSICAL EXAM
[FreeTextEntry1] : General exam   Constitutional: The patient appears well-developed, well-nourished, and in no apparent distress. Patient is well-groomed.    Skin: The skin is warm and dry, with normal turgor.  Eyes: PERRL.    ENMT: Ears: Hearing is grossly within normal limits.    Neck: Supple: The neck is supple.    Respiratory: Inspection: Breathing unlabored.    Neurologic: Alert and oriented x 3.   Psychiatric: Patient is cooperative and appropriate.  Mood and affect are normal.  Patient's insight is good, and memory and judgment are intact.  Cervical Skin c/d/i without any erythema, swelling, effusion  Diffuse tenderness Multiple trigger points  Lumbar Skin c/d/i without any erythema, swelling, effusion Well healed surgical scar Diffuse tenderness Multiple trigger points SLR + bilaterally Spastic, antalgic gait

## 2024-04-16 RX ORDER — DALFAMPRIDINE 10 MG/1
10 TABLET, FILM COATED, EXTENDED RELEASE ORAL
Qty: 60 | Refills: 6 | Status: ACTIVE | COMMUNITY
Start: 2023-05-01 | End: 1900-01-01

## 2024-04-18 DIAGNOSIS — F41.9 ANXIETY DISORDER, UNSPECIFIED: ICD-10-CM

## 2024-04-18 RX ORDER — ALPRAZOLAM 0.25 MG/1
0.25 TABLET ORAL
Qty: 2 | Refills: 0 | Status: ACTIVE | COMMUNITY
Start: 2024-04-18 | End: 1900-01-01

## 2024-04-19 ENCOUNTER — APPOINTMENT (OUTPATIENT)
Dept: PHYSICAL MEDICINE AND REHAB | Facility: CLINIC | Age: 49
End: 2024-04-19
Payer: MEDICARE

## 2024-04-19 ENCOUNTER — OUTPATIENT (OUTPATIENT)
Dept: OUTPATIENT SERVICES | Facility: HOSPITAL | Age: 49
LOS: 1 days | End: 2024-04-19
Payer: MEDICARE

## 2024-04-19 DIAGNOSIS — M54.16 RADICULOPATHY, LUMBAR REGION: ICD-10-CM

## 2024-04-19 DIAGNOSIS — Z87.59 PERSONAL HISTORY OF OTHER COMPLICATIONS OF PREGNANCY, CHILDBIRTH AND THE PUERPERIUM: Chronic | ICD-10-CM

## 2024-04-19 PROCEDURE — 64483 NJX AA&/STRD TFRM EPI L/S 1: CPT | Mod: 50

## 2024-04-19 PROCEDURE — 64483 NJX AA&/STRD TFRM EPI L/S 1: CPT

## 2024-04-19 PROCEDURE — 64484 NJX AA&/STRD TFRM EPI L/S EA: CPT | Mod: RT

## 2024-05-03 ENCOUNTER — APPOINTMENT (OUTPATIENT)
Dept: PHYSICAL MEDICINE AND REHAB | Facility: CLINIC | Age: 49
End: 2024-05-03
Payer: MEDICARE

## 2024-05-03 PROCEDURE — 64643 CHEMODENERV 1 EXTREM 1-4 EA: CPT

## 2024-05-03 PROCEDURE — 64642 CHEMODENERV 1 EXTREMITY 1-4: CPT

## 2024-05-03 PROCEDURE — 95874 GUIDE NERV DESTR NEEDLE EMG: CPT

## 2024-05-03 PROCEDURE — 99213 OFFICE O/P EST LOW 20 MIN: CPT | Mod: 25

## 2024-05-03 NOTE — PHYSICAL EXAM
[Normal] : Oriented to person, place, and time, insight and judgement were intact and the affect was normal [de-identified] : no distress [de-identified] : well perfused [de-identified] : soft [de-identified] : Amb w/o AD- + bl recurvatum [de-identified] : Motor nml grade throughout,  MAS 1+ spasticity

## 2024-05-03 NOTE — HISTORY OF PRESENT ILLNESS
[FreeTextEntry1] : Patient presents for botox. No new medical issues. Reports that she has significant improvement of discomfort and spasms and improvement of ability to walk with prior injections- however, effect has worn off.

## 2024-05-03 NOTE — PROCEDURE
[Consent] : consent was given by patient or guardian [Site Verification] : the injection site was verified [Post-Injection Instructions Provided] : post-injection instructions were provided [] : EMG Guidance was used during the procedure [TWNoteComboBox1] : Semitendinosus [TWNoteComboBox2] : 100 Units [de-identified] : Lateral Gastrocnemius [de-identified] : 100 Units [de-identified] : Medial Gastrocnemius [de-identified] : 150 Units [de-identified] : Soleus [de-identified] : 50 Units [de-identified] : Lateral Gastrocnemius [de-identified] : 70 Units [de-identified] : Medial Gastrocnemius [de-identified] : 130 Units

## 2024-05-06 RX ORDER — ONABOTULINUMTOXINA 200 [USP'U]/1
200 INJECTION, POWDER, LYOPHILIZED, FOR SOLUTION INTRADERMAL; INTRAMUSCULAR
Refills: 0 | Status: COMPLETED | OUTPATIENT
Start: 2024-05-06

## 2024-05-06 RX ADMIN — ONABOTULINUMTOXINA 0 UNIT: 200 INJECTION, POWDER, LYOPHILIZED, FOR SOLUTION INTRADERMAL; INTRAMUSCULAR at 00:00

## 2024-05-09 ENCOUNTER — RX RENEWAL (OUTPATIENT)
Age: 49
End: 2024-05-09

## 2024-05-09 RX ORDER — TIZANIDINE 2 MG/1
2 TABLET ORAL
Qty: 60 | Refills: 11 | Status: ACTIVE | COMMUNITY
Start: 2023-09-07 | End: 1900-01-01

## 2024-05-16 ENCOUNTER — APPOINTMENT (OUTPATIENT)
Dept: PHYSICAL MEDICINE AND REHAB | Facility: CLINIC | Age: 49
End: 2024-05-16
Payer: MEDICARE

## 2024-05-16 DIAGNOSIS — G89.29 MYALGIA, OTHER SITE: ICD-10-CM

## 2024-05-16 DIAGNOSIS — M25.562 PAIN IN LEFT KNEE: ICD-10-CM

## 2024-05-16 DIAGNOSIS — M96.1 POSTLAMINECTOMY SYNDROME, NOT ELSEWHERE CLASSIFIED: ICD-10-CM

## 2024-05-16 DIAGNOSIS — M48.062 SPINAL STENOSIS, LUMBAR REGION WITH NEUROGENIC CLAUDICATION: ICD-10-CM

## 2024-05-16 DIAGNOSIS — G62.9 POLYNEUROPATHY, UNSPECIFIED: ICD-10-CM

## 2024-05-16 DIAGNOSIS — M79.18 MYALGIA, OTHER SITE: ICD-10-CM

## 2024-05-16 PROCEDURE — 99214 OFFICE O/P EST MOD 30 MIN: CPT

## 2024-05-16 RX ORDER — AMITRIPTYLINE HYDROCHLORIDE 25 MG/1
25 TABLET, FILM COATED ORAL DAILY
Qty: 30 | Refills: 1 | Status: ACTIVE | COMMUNITY
Start: 2024-02-27 | End: 1900-01-01

## 2024-05-16 NOTE — PHYSICAL EXAM
[FreeTextEntry1] : General exam   Constitutional: The patient appears well-developed, well-nourished, and in no apparent distress. Patient is well-groomed.    Skin: The skin is warm and dry, with normal turgor.  Eyes: PERRL.    ENMT: Ears: Hearing is grossly within normal limits.    Neck: Supple: The neck is supple.    Respiratory: Inspection: Breathing unlabored.    Neurologic: Alert and oriented x 3.   Psychiatric: Patient is cooperative and appropriate.  Mood and affect are normal.  Patient's insight is good, and memory and judgment are intact.  Left leg with increased swelling in the ankle and with increased erythema No noticeable calf swelling

## 2024-05-16 NOTE — HISTORY OF PRESENT ILLNESS
[FreeTextEntry1] : Televisit encounter Patient consented to be evaluated via televisit today using audio and video Present during today's encounter:  SUGAR PALMA and myself Patient was located at home. I was located at: [address] [place] [address]  SUGAR PALMA had LUMBAR TRANSFORAMINAL EPIDURAL STEROID INJECTION WITH FLUOROSCOPIC GUIDANCE BILATERAL L5 on 4/19/24. Today patient is here for follow up. Patient reports greater than 80% reduction in pain as tested by the NRS pain scale (Pain went down from 10/10 on NRS prior to injection) that is lasting. Patient also reports improvement in quality of life. In addition, patient reports improvement of function and ADLs along with a temporary decrease in pain medication use. Pain is ~4/10 on NRS at this time. She is asking for refill of Elavil as it is making a significant difference in her pain. She took the Tramadol PRN during her most severe pain flare up and this made a huge difference for her pains. She is asking if she can have more in the future.  She is also noting that her LEFT knee is painful. She is having swelling and redness in the entire left leg.   Denies any new pain, numbness or weakness, bowel/bladder dysfunction, saddle anesthesia, fevers, chills, weight loss, night pain, or night sweats at this time.

## 2024-05-16 NOTE — ASSESSMENT
[FreeTextEntry1] : 48 yo F with history of MS, chronic back pain with post-laminectomy syndrome and myofascial pain syndrome and spasticity with intermittent back pain exacerbations. Now with left knee pain and possible swelling from a popliteal cyst vs CRPS of the LLE.  Patient reassured and educated on the diagnosis and treatment options. Risks and benefits of treatment and of delaying treatment discussed with patient. Risks discussed include but not limited to: progression of symptoms, worsening pain and functional status, etc.  This note was generated using Dragon medical dictation software. A reasonable effort had been made for proofreading its contents, but spelling mistakes or grammatical errors may still remain. If there are any questions or points of clarification needed please notify my office.  Continue Elavil 25mg PO qHS PRN pain, dispense #30. Rx sent. Patient warned about the sedative nature of this medication and recommended not to drive or to handle heavy machinery.. Nevertheless, risks and benefits of taking TCAs was discussed in detail. Patient cautioned to discontinue if he develops chest pains, palpitations, confusion, depression, suicidal/homicidal ideations, sedation, urinary or fecal difficulties, etc.  Previously prescribed: Tramadol 50mg PO q12 PRN severe 10/10 pain on NRS, 7 day supply #14 tablets. I Stop was reviewed and patient is consistent with history presented. If patient requires more, they will need to come in for UDT and sign opiate contract.  Previously: Discussed SCS but patient would like to hold off. Referring to Dr. Garcia from anesthesia pain management for consideration of pain/spasticity pump trial Follow up routinely with Dr. Hope for botox  Referring to ortho sport for knee pains Follow up 4 weeks  Patient was advised if the following symptoms develop: chills, fever, loss of bladder control, bowel incontinence or urinary retention, numbness/tingling or weakness is present in upper or lower extremities, to go to the nearest emergency room. This may be a new clinical condition not present at the time of the patient visit that may lead to paralysis and/or death. Patient advised if the above symptoms developed to also call the office immediately to inform us and to go to the nearest emergency room. Consent: The patient's consent was obtained including but not limited to risks of crusting, scabbing, blistering, scarring, darker or lighter pigmentary change, recurrence, incomplete removal and infection. Show Aperture Variable?: Yes Detail Level: Simple Post-Care Instructions: I reviewed with the patient in detail post-care instructions. Patient is to wear sunprotection, and avoid picking at any of the treated lesions. Pt may apply Vaseline to crusted or scabbing areas. Render Note In Bullet Format When Appropriate: No Number Of Freeze-Thaw Cycles: 1 freeze-thaw cycle Medical Necessity Clause: This procedure was medically necessary because the lesions that were treated were: Medical Necessity Information: It is in your best interest to select a reason for this procedure from the list below. All of these items fulfill various CMS LCD requirements except the new and changing color options. Spray Paint Text: The liquid nitrogen was applied to the skin utilizing a spray paint frosting technique.

## 2024-05-17 ENCOUNTER — EMERGENCY (EMERGENCY)
Facility: HOSPITAL | Age: 49
LOS: 1 days | Discharge: HOME | End: 2024-05-17
Attending: EMERGENCY MEDICINE | Admitting: INTERNAL MEDICINE
Payer: MEDICARE

## 2024-05-17 VITALS
RESPIRATION RATE: 18 BRPM | SYSTOLIC BLOOD PRESSURE: 156 MMHG | HEIGHT: 62 IN | WEIGHT: 104.94 LBS | OXYGEN SATURATION: 99 % | HEART RATE: 116 BPM | DIASTOLIC BLOOD PRESSURE: 95 MMHG | TEMPERATURE: 97 F

## 2024-05-17 VITALS
DIASTOLIC BLOOD PRESSURE: 96 MMHG | OXYGEN SATURATION: 99 % | RESPIRATION RATE: 18 BRPM | HEART RATE: 98 BPM | TEMPERATURE: 98 F | SYSTOLIC BLOOD PRESSURE: 145 MMHG

## 2024-05-17 DIAGNOSIS — Z87.59 PERSONAL HISTORY OF OTHER COMPLICATIONS OF PREGNANCY, CHILDBIRTH AND THE PUERPERIUM: Chronic | ICD-10-CM

## 2024-05-17 PROCEDURE — 99285 EMERGENCY DEPT VISIT HI MDM: CPT

## 2024-05-17 PROCEDURE — 73562 X-RAY EXAM OF KNEE 3: CPT | Mod: 26,LT

## 2024-05-17 PROCEDURE — 73562 X-RAY EXAM OF KNEE 3: CPT

## 2024-05-17 PROCEDURE — 93971 EXTREMITY STUDY: CPT | Mod: 26,LT

## 2024-05-17 PROCEDURE — 93971 EXTREMITY STUDY: CPT

## 2024-05-17 PROCEDURE — 99284 EMERGENCY DEPT VISIT MOD MDM: CPT | Mod: 25

## 2024-05-17 RX ORDER — TIZANIDINE 4 MG/1
0 TABLET ORAL
Refills: 0 | DISCHARGE

## 2024-05-17 RX ORDER — OCRELIZUMAB 300 MG/10ML
0 INJECTION INTRAVENOUS
Refills: 0 | DISCHARGE

## 2024-05-17 NOTE — ED ADULT NURSE NOTE - NSFALLUNIVINTERV_ED_ALL_ED
Bed/Stretcher in lowest position, wheels locked, appropriate side rails in place/Call bell, personal items and telephone in reach/Instruct patient to call for assistance before getting out of bed/chair/stretcher/Non-slip footwear applied when patient is off stretcher/[place] to call system/Physically safe environment - no spills, clutter or unnecessary equipment/Purposeful proactive rounding/Room/bathroom lighting operational, light cord in reach

## 2024-05-17 NOTE — ED ADULT TRIAGE NOTE - ARRIVAL INFO ADDITIONAL COMMENTS
Patient presents to ED with progressive weakness and swelling to left leg, also feels cold to touch. Worsening significantly since Wednesday. Has baseline blurred vision. Baseline dysmetria due to hiistory of MS. Takes gabapentin, tizanidine for breakthrough spasms. Empira generic taken daily. Fall on Wednesday, no head strike. Pain noted in right leg and back.

## 2024-05-17 NOTE — ED PROVIDER NOTE - WR INTERPRETED BY 1
This note was copied from a baby's chart.  Initial Consult:     pc/s for breech presentation at 39+1 weeks.  First time breastfeeding.  Latch has been difficult to achieve.  Baby sleepy and received circumcision at 22 HOL.  POB report baby is beginning to show feeding cues again. Have been using pacifier for comfort since circumcision.     Basic breastfeeding information education given to include feeding baby with feeding cues and at least a minimum of 8x/24 hours.  It is not recommended to let baby sleep longer than 4 hours between feedings and if sleepy, place skin to skin to promote feeding interest and milk production.   Expect cluster feeding as this is normal during early days of life and growth spurts. Discussed recognition of early feeding cues and importance of deep latch. It is not recommended to use pacifiers as they can mask early feeding cues.    Assisted with latch in football hold, slight position adjustments made, but MOB able to independently latch on opposite side.      Reviewed hand expression, MOB able to perform independently.     Expect breast changes as breastmilk increases in volume.  Frequent feedings as well as looking for transitioning stools from dark meconium to bright yellow/green seedy loose stools by day 5.     [facility] Breastfeeding Resources given to MOB     Referral sent to [facility]     Nadeem Hadley

## 2024-05-17 NOTE — ED PROVIDER NOTE - OBJECTIVE STATEMENT
49 year old female with left lower knee/leg swelling for a few weeks, worsening for the past 2-3 days. States history of MS, severe sciatica, has lower leg weakness for years, and the swelling and pain got worse for the past few days, and she fell on her knee. Denies NVD, dysuria, cough, sob, chest/abdominal/neck pain, HA, focal weakness/numbness. Denies any other symptoms.

## 2024-05-17 NOTE — ED PROVIDER NOTE - NSFOLLOWUPINSTRUCTIONS_ED_ALL_ED_FT
Acute Knee Pain, Adult  Acute knee pain is sudden and may be caused by damage, swelling, or irritation of the muscles and tissues that support the knee. Pain may result from:  A fall.  An injury to the knee from twisting motions.  A hit to the knee.  Infection.  Acute knee pain may go away on its own with time and rest. If it does not, your health care provider may order tests to find the cause of the pain. These may include:  Imaging tests, such as an X-ray, MRI, CT scan, or ultrasound.  Joint aspiration. In this test, fluid is removed from the knee and evaluated.  Arthroscopy. In this test, a lighted tube is inserted into the knee and an image is projected onto a TV screen.  Biopsy. In this test, a sample of tissue is removed from the body and studied under a microscope.  Follow these instructions at home:  If you have a knee sleeve or brace:      Wear the knee sleeve or brace as told by your health care provider. Remove it only as told by your health care provider.  Loosen it if your toes tingle, become numb, or turn cold and blue.  Keep it clean.  If the knee sleeve or brace is not waterproof:  Do not let it get wet.  Cover it with a watertight covering when you take a bath or shower.  Activity    Rest your knee.  Do not do things that cause pain or make pain worse.  Avoid high-impact activities or exercises, such as running, jumping rope, or doing jumping jacks.  Work with a physical therapist to make a safe exercise program, as recommended by your health care provider. Do exercises as told by your physical therapist.  Managing pain, stiffness, and swelling      If directed, put ice on the affected knee. To do this:  If you have a removable knee sleeve or brace, remove it as told by your health care provider.  Put ice in a plastic bag.  Place a towel between your skin and the bag.  Leave the ice on for 20 minutes, 2–3 times a day.  Remove the ice if your skin turns bright red. This is very important. If you cannot feel pain, heat, or cold, you have a greater risk of damage to the area.  If directed, use an elastic bandage to put pressure (compression) on your injured knee. This may control swelling, give support, and help with discomfort.  Raise (elevate) your knee above the level of your heart while you are sitting or lying down.  Sleep with a pillow under your knee.  General instructions    Take over-the-counter and prescription medicines only as told by your health care provider.  Do not use any products that contain nicotine or tobacco, such as cigarettes, e-cigarettes, and chewing tobacco. If you need help quitting, ask your health care provider.  If you are overweight, work with your health care provider and a dietitian to set a weight-loss goal that is healthy and reasonable for you. Extra weight can put pressure on your knee.  Pay attention to any changes in your symptoms.  Keep all follow-up visits. This is important.  Contact a health care provider if:  Your knee pain continues, changes, or gets worse.  You have a fever along with knee pain.  Your knee feels warm to the touch or is red.  Your knee mari or locks up.  Get help right away if:  Your knee swells, and the swelling becomes worse.  You cannot move your knee.  You have severe pain in your knee that cannot be managed with pain medicine.  Summary  Acute knee pain can be caused by a fall, an injury, an infection, or damage, swelling, or irritation of the tissues that support your knee.  Your health care provider may perform tests to find out the cause of the pain.  Pay attention to any changes in your symptoms. Relieve your pain with rest, medicines, light activity, and the use of ice.  Get help right away if your knee swells, you cannot move your knee, or you have severe pain that cannot be managed with medicine.

## 2024-05-17 NOTE — ED PROVIDER NOTE - CLINICAL SUMMARY MEDICAL DECISION MAKING FREE TEXT BOX
49 year old female with left knee pain, X ray- no fracture/dislocation, pending US r/o DVT, signed out to  to f/u US and disposition accordingly

## 2024-05-17 NOTE — ED PROVIDER NOTE - PATIENT PORTAL LINK FT
You can access the FollowMyHealth Patient Portal offered by [facility] by registering at the following website: [email or web address]. By joining [facility]’s FollowMyHealth portal, you will also be able to view your health information using other applications (apps) compatible with our system.

## 2024-05-17 NOTE — ED PROVIDER NOTE - MUSCULOSKELETAL, MLM
minimal swelling to the left lower leg, full ROM of the left knee, strength G3 in left, G5 in right lower leg-s tates that this is her baseline

## 2024-05-17 NOTE — ED PROVIDER NOTE - IV ALTEPLASE EXCL REL HIDDEN
Client called stating she did not realize she missed her last appointment with Dr. Velasquez. She is requesting a refill on her medications.    Last appointment 8/6  No show 8/27  Appointment scheduled for 9/24    
show

## 2024-05-17 NOTE — ED PROVIDER NOTE - PROGRESS NOTE DETAILS
Patient signed out pending results of ultrasound.  These were negative for DVT.  There is no mention of a possible Baker's cyst.  The x-ray likewise was unremarkable for any type of fracture or obvious abnormality.  Discussed results with the patient and she can follow-up as an outpatient at this time.  Will discharge per signout plan

## 2024-05-21 ENCOUNTER — APPOINTMENT (OUTPATIENT)
Dept: NEUROLOGY | Facility: CLINIC | Age: 49
End: 2024-05-21
Payer: MEDICARE

## 2024-05-21 DIAGNOSIS — M79.89 OTHER SPECIFIED SOFT TISSUE DISORDERS: ICD-10-CM

## 2024-05-21 LAB
ALBUMIN SERPL ELPH-MCNC: 4.5 G/DL
ALP BLD-CCNC: 101 U/L
ALT SERPL-CCNC: 12 U/L
ANION GAP SERPL CALC-SCNC: 14 MMOL/L
APPEARANCE: CLEAR
AST SERPL-CCNC: 14 U/L
BASOPHILS # BLD AUTO: 0.05 K/UL
BASOPHILS NFR BLD AUTO: 0.6 %
BILIRUB SERPL-MCNC: <0.2 MG/DL
BILIRUBIN URINE: NEGATIVE
BLOOD URINE: NEGATIVE
BUN SERPL-MCNC: 11 MG/DL
CALCIUM SERPL-MCNC: 9.7 MG/DL
CD16+CD56+ CELLS # BLD: 310 CELLS/UL
CD16+CD56+ CELLS NFR BLD: 12 %
CD19 CELLS NFR BLD: 3 CELLS/UL
CD3 CELLS # BLD: 2215 CELLS/UL
CD3 CELLS NFR BLD: 86 %
CD3+CD4+ CELLS # BLD: 1683 CELLS/UL
CD3+CD4+ CELLS NFR BLD: 65 %
CD3+CD4+ CELLS/CD3+CD8+ CLL SPEC: 3.65 RATIO
CD3+CD8+ CELLS # SPEC: 461 CELLS/UL
CD3+CD8+ CELLS NFR BLD: 18 %
CELLS.CD3-CD19+/CELLS IN BLOOD: <1 %
CHLORIDE SERPL-SCNC: 102 MMOL/L
CO2 SERPL-SCNC: 27 MMOL/L
COLOR: YELLOW
CREAT SERPL-MCNC: 0.63 MG/DL
EGFR: 108 ML/MIN/1.73M2
EOSINOPHIL # BLD AUTO: 0.76 K/UL
EOSINOPHIL NFR BLD AUTO: 8.9 %
GLUCOSE QUALITATIVE U: NEGATIVE MG/DL
GLUCOSE SERPL-MCNC: 75 MG/DL
HCT VFR BLD CALC: 43.7 %
HGB BLD-MCNC: 14.1 G/DL
IMM GRANULOCYTES NFR BLD AUTO: 0.1 %
KETONES URINE: NEGATIVE MG/DL
LEUKOCYTE ESTERASE URINE: NEGATIVE
LYMPHOCYTES # BLD AUTO: 2.45 K/UL
LYMPHOCYTES NFR BLD AUTO: 28.6 %
MAN DIFF?: NORMAL
MCHC RBC-ENTMCNC: 31.4 PG
MCHC RBC-ENTMCNC: 32.3 GM/DL
MCV RBC AUTO: 97.3 FL
MONOCYTES # BLD AUTO: 0.8 K/UL
MONOCYTES NFR BLD AUTO: 9.3 %
NEUTROPHILS # BLD AUTO: 4.5 K/UL
NEUTROPHILS NFR BLD AUTO: 52.5 %
NITRITE URINE: NEGATIVE
PH URINE: 6.5
PLATELET # BLD AUTO: 372 K/UL
POTASSIUM SERPL-SCNC: 4.9 MMOL/L
PROT SERPL-MCNC: 6.8 G/DL
PROTEIN URINE: NEGATIVE MG/DL
RBC # BLD: 4.49 M/UL
RBC # FLD: 12 %
SODIUM SERPL-SCNC: 143 MMOL/L
SPECIFIC GRAVITY URINE: 1.01
UROBILINOGEN URINE: 0.2 MG/DL
WBC # FLD AUTO: 8.57 K/UL

## 2024-05-21 PROCEDURE — G2211 COMPLEX E/M VISIT ADD ON: CPT

## 2024-05-21 PROCEDURE — 99214 OFFICE O/P EST MOD 30 MIN: CPT

## 2024-05-21 NOTE — REASON FOR VISIT
[Follow-Up: _____] : a [unfilled] follow-up visit [FreeTextEntry1] :    Verbal consent given on May 21, 2024  at 11:00AM by SUGAR PALMA   Patient location: [address] [place] [address]   Provider location: Medical office    cass

## 2024-05-21 NOTE — HISTORY OF PRESENT ILLNESS
[FreeTextEntry1] : INTERIM HX 05/21/2024: [This is a telehealth 2-way video visit] Pt was in ED last Friday- L ankle and knee swelling, limiting her mobility.  x few weeks, worsened in the last week. Cannot bend the knee. Leg feels cold. Doppler was neg. pulses were reportedly intact. Unremarkable left knee radiographs. Seeing ortho tomorrow.  She reports continued muscle spasms in LE, L>R, got BOTOX 2 weeks ago, this helps. Also reports tremors are bothersome in hands, R>L and cannot write- not new symptom.  + fatigue   INTERIM HX 03/15/2024: Last botox injection LLE with Dr Hope 1/30/2024, she gets it [id number]. Also gets trigger point injections in back and LESI. R leg sciatica. She is in PT.  Stopped cymbalta. started on amitriptyline 25 mg QHS.  Has fallen in the house. LLE feels heavier. Also experiencing more urinary issues-frequent urination. She denies burning.  Due for ocrevus next month.  Had MR L spine 12/2023- L4/5 moderate to severe left neural foraminal narrowing and moderate right neural foraminal narrowing, no significant canal narrowing.   INTERIM HX 11/24/2023: [This is a telehealth 2-way video visit ] "weird" sensation by L knee, like a tingly sensation, "pulls me back". + Muscle cramps in calf muscles. "Feels like a locked knee". L radicular pains. hx of lumbar laminectomy in 2019.  Tylenol and advil not helping.  Symptoms worse over the last 2 weeks.  Had trigger point injections in back 11/16. Dr Anna recommend MRI L spine for radiculopathy and plan for LESI next week.  INTERIM HX 11/07/2023: [This is a telehealth 2-way video visit ] On Adderall 5 mg BID IR (@ 8 am and 1 pm)- not effective, no s/e. By 3-4 o'clock "i am done". In the past was on XR, worked better.  Experiencing more urinary urgency in the last few weeks, no incontinence. no dysuria. last UTI was 5 years ago.  INTERIM HX 09/07/2023: MRI brain w/w/o 7/2023 (compared w/ 4/28/2022) - stable, moderate demyelinating disease. labs 6/6/2023- Vitamin D 60, WBC 11.35k, ALC 2.03km LFT WNL. Here for follow-up with spouse Michael. L foot still weak. Also reports burning pain on R lateral thigh for past few months. Not alleviated by Tylenol, Advil, Voltaren gel, lidocaine patch. It is constant, not affected by position. No inciting factors. Reports her gait is still difficult. Feels like the L knee is locked and difficult to lift. Working with PT, wears brace and sleeve. Feels these devices are helping.  notes that L leg is cramped/stiff/straightened in the AM. Taking Ampyra and reports it is helpful, no side effects, stride and stamina better. No urinary/fecal incontinence. Increased urinary urgency. BM every 2-3 days. Notes if flexes neck, gets numbness in fingers. Only happens at night. No neck stiffness/pain. Also reports having chronic head and b/l hand tremor. Has not noticed any difference with alcohol. Tried taking primidone in past, gave her scary thoughts and poor mood. Adderall helps. Patient expresses feeling depressed about her condition and planning to see psychologist. Anxiety is also an issue. Tearful during visit.   HPI (initial visit May 01, 2023)- SUGAR PALMA is a 48 year old woman referred for hx of MS. She has been a patient of Dr. Mitchell (records available for review)- no longer accepting her insurance.   MS hx- Diagnosed in June 2020.  2018- Left foot started to drag, "left foot drop". Numbness in toes (noticed over time).  2019- Diagnosed with lumbar spondylosis, s/p L4/L5 laminectomy. No improvement in symptoms.  2020- Saw Dr Mitchell, recommended MRI brain and C/T spine- evidence of MS.  9/2020- Started Ocrevus, last infusion 3/15/2023.  "I do not recall any exacerbations". There has been a gradual weakness in left foot- continue to slowly progress. Never had optic neuritis. Saw Dr. 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 [facility] 20/25. She has spasticity in legs, gets botox every 3 months- it helps. Spasms can be severe, gale in morning.  Last brain MRI 4/2022 and C/T spine 11/2022- stable per patient at [facility]. Amypra helped gait- but insurance denied.  No bladder issues, in past had recurrent UTI's

## 2024-05-21 NOTE — PHYSICAL EXAM
[FreeTextEntry1] : Limited telehealth exam-  Pt sitting on chair. LE's exposed.  LLE appears more swollen compared to the right, gale at the knees. Eczema rash over front of L leg.  Patient able to wiggle toes on leg, extend knee and raise hip. Weakness on dorsiflexion (chronic).

## 2024-05-21 NOTE — ASSESSMENT
[FreeTextEntry1] : Assessment/Plan:  49 year old female w/ hx of progressive LLE weakness, ataxia and spasticity, diagnosed with multiple sclerosis in June 2020 and has been on Ocrevus since 9/2020. 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  Primary progressive MS- dx'd 6/2022, on Ocrevus since 9/2020. Not active, ? progressing  Unclear if there has been any true clinical progression since start of Ocrevus, or if she experiences subjective worsening due to spasticity, fatigue, chronic pain and worsening mood. Need to continue to monitor and treat her symptomatically.   Plan: 1. Diagnostic Plan/Imaging: - MRI brain and cervical/thoracic spine w/o contrast 7/2024 for radiological stability (annual exam).  2. Disease Modifying therapy plan: Continue Ocrevus 600 mg q6 monthly infusions Ocrevus labs every 6 months   3. Symptomatic therapy plan: () Fatigue: Continue Adderall - Continue 10 mg IR BID. (insurance has not approved XR) () Spasticity: On baclofen 20 mg TID. Continue to follow with Dr Hope. Tizanidine 2-4 mg BID (can take scheduled). () Tremors: Will trial primidone. reviewed s/e. 25 mg qhs x 1 week and then increase to 25 mg BID.  () Neuropathic pain: On gabapentin 600 mg BID () Bladder Dysfunction: + urgency. Check UA/culture to rule out UTI. Recommend urology referral. () Mobility: Continue PT. Continue Ampyra 10 mg BID. () Anxiety/depression: sporadically uses xanax. On amitriptyline 25 mg qhs. Recommend establishing care with psychiatrist and psychologist. () Vitamin D3 and B12 supplementations  4. Lumbar radiculopathy (R>L)- continue f/u with PM&R. s/p LESI. Advised patient can take Tizanidine 4 mg BID PRN as needed.  5. NEW/WORSENING LLE swelling- Doppler neg for DVT. L knee Xray normal. Pt scheduled to see Ortho tomorrow. I would also recommend seeing vascular surgeon. Symptoms are NOT MS related. If ortho and vascular work up neg, ? CRPS.    Return to clinic 4 months  The above plan was discussed with SUGAR PALMA in great detail. SUGAR PALMA verbalized understanding and agrees with plan as detailed above. Patient was provided education and counselling on current diagnosis/symptoms. She was advised to call our clinic at [phone] for any new or worsening symptoms, or with any questions or concerns. SUGAR PALMA expressed understanding and all her questions/concerns were addressed.  Tamara Tomlin M.D.

## 2024-05-21 NOTE — DATA REVIEWED
[de-identified] : MRI brain w/w/o 7/2023 (compared w/ 4/28/2022) - stable, moderate demyelinating disease.  Last brain MRI 4/2022 and C/T spine 11/2022- stable per patient at [facility]. (reviewed reports)- MRI brain 4/2022- stable disease burden compared to 9/2021. No active lesions MRI C and T spine 11/2022- stable c/w 9/2021. Diffuse/patchy lesions from C1/C2 to T2/T3, C5-T5/6 and scattered T7-T8-T10, ? T2 focal cord atrophy.

## 2024-05-22 ENCOUNTER — APPOINTMENT (OUTPATIENT)
Dept: ORTHOPEDIC SURGERY | Facility: CLINIC | Age: 49
End: 2024-05-22
Payer: MEDICARE

## 2024-05-22 VITALS — HEIGHT: 62 IN | WEIGHT: 103 LBS | BODY MASS INDEX: 18.95 KG/M2

## 2024-05-22 DIAGNOSIS — M17.12 UNILATERAL PRIMARY OSTEOARTHRITIS, LEFT KNEE: ICD-10-CM

## 2024-05-22 DIAGNOSIS — M25.472 EFFUSION, LEFT ANKLE: ICD-10-CM

## 2024-05-22 DIAGNOSIS — M21.372 FOOT DROP, LEFT FOOT: ICD-10-CM

## 2024-05-22 PROCEDURE — 73610 X-RAY EXAM OF ANKLE: CPT | Mod: LT

## 2024-05-22 PROCEDURE — 73564 X-RAY EXAM KNEE 4 OR MORE: CPT | Mod: LT

## 2024-05-22 PROCEDURE — 99204 OFFICE O/P NEW MOD 45 MIN: CPT

## 2024-05-22 NOTE — PHYSICAL EXAM
[de-identified] : Constitutional o Appearance : well-nourished, well developed, alert, in no acute distress  Head and Face o Head :  Inspection : atraumatic, normocephalic o Face :  Inspection : no visible rash or discoloration Respiratory o Respiratory Effort: breathing unlabored  Neurologic o Mental Status Examination :  Orientation : grossly oriented to person, place and time Psychiatric o Mood and Affect: mood normal, affect appropriate   Right Lower Extremity o Buttock : no tenderness, swelling or deformities  o Right Hip :  Inspection/Palpation : no tenderness, swelling or deformities  Range of Motion : full and painless in all planes, no crepitance  Stability : joint stability intact  Strength : extension, flexion, adduction, abduction, internal rotation and external rotation 4+/5   o Right Knee :  Inspection/Palpation : no tenderness to palpation, no swelling  Range of Motion : active flexion and extension full and painless, no crepitance  Stability : no valgus or varus instability present on provocative testing  Strength : flexion and extension 4+/5  Tests and Signs : Anterior Drawer negative, Lachman negative, Karli's negative  o Left Lower Extremity o Buttock : no tenderness, swelling or deformities  o Left Hip :  Inspection/Palpation : no tenderness, no swelling or deformities  Range of Motion : full and painless in all planes, no crepitance  Stability : joint stability intact  Strength : extension, flexion, adduction, abduction, internal rotation and external rotation 3/5  o Left Knee :  Inspection/Palpation : no tenderness to palpation, no swelling  Range of Motion : 0-140, 40 degree extensor lag, no crepitance  Stability : no valgus or varus instability present on provocative testing  Strength : flexion and extension 3/5  Tests and Signs : Anterior Drawer negative, Lachman negative, Karli's negative  o Left Ankle : no swelling all planes  Inspection/Palpation : mild tenderness over the medial clear space,  to palpation, psoriatic lesion on her ankle   Range of Motion : arc of motion full and painless in  Stability : no joint instability on provocative testing  Strength : all muscles 5/5 plantar flexion 4/5  o Skin : no erythema or ecchymosis present o Sensation : sensation to pin intact o Tests and Signs : negative Babb test, negative Anterior Drawer   Gait: goes into recovatum, has trouble walking, no drop foot on the right, left drop foot, psoriatic on her right ankle. not using any walking assistance device, no significant extremity swelling or lymphedema  Radiology Results: 5/22/2024 Left Knee: Standing AP, lateral, tunnel and skyline views were obtained and reveal minimal medial and patellofemoral arthritis  Left Ankle: AP, lateral and oblique views were obtained and revealed no evident fracture mortise well maintained no significant degenerative arthritis of the ankle

## 2024-05-22 NOTE — HISTORY OF PRESENT ILLNESS
[de-identified] : 49 year old female with history of MS presents complaining of sudden onset of left knee and ankle swelling. It started about 1 week ago. She notes that her knee and ankle had become quite swollen. She notes mild discomfort about the knee but no pain about the ankle. She states that her swelling improves when she elevates and ices the knee and ankle. Throughout the day, her swelling increases. She has stiffness about the left ankle due to the swelling. She notes weakness in her legs from the MS. She is not physically active due to the MS, as well. Her pain management physician and Neurologist do not believe the swelling is related to her MS. She was seen at [facility] on 05/17/24 . X-rays of her left knee and doppler were done. X-ray of the left knee was unremarkable for acute fracture. Doppler was negative for DVT. She notes she feels better when she leans over. She notes she has a lot of lower back issues.

## 2024-05-22 NOTE — ADDENDUM
[FreeTextEntry1] : I, JOSE CESAR, acted solely as a scribe for Dr. Alexi Pritchard on this date 05/22/2024.  All medical record entries made by the Scribe were at my, Dr. Aleix Pritchard, direction and personally dictated by me on 05/22/2024. I have reviewed the chart and agree that the record accurately reflects my personal performance of the history, physical exam, assessment and plan. I have also personally directed, reviewed, and agreed with the chart.

## 2024-05-22 NOTE — REASON FOR VISIT
[Initial Visit] : an initial visit for [Knee Pain] : knee pain [FreeTextEntry2] : left ankle swelling

## 2024-05-22 NOTE — DISCUSSION/SUMMARY
[de-identified] : I went over the pathophysiology of the patient's symptoms in great detail with the patient. I discussed the underlying pathophysiology of the patient's condition in great detail with the patient. I went over the patient's x-rays with them in great detail. I warned her to be very careful because a fall could result in serious consequences. We discussed the use of AFO. At this time, she will start a course of physical therapy for strengthening and flexibility. A prescription was provided. She should be elevating her left leg. Proper walking assistance device protocol was discussed and demonstrated with the patient. A prescription for a walker was provided. We are prescribing a bone density test at this time.   All of their questions were answered. They understand and consent to the plan.   FU in one month. She will bring her AFO upon her return.

## 2024-06-03 ENCOUNTER — RX RENEWAL (OUTPATIENT)
Age: 49
End: 2024-06-03

## 2024-06-03 RX ORDER — BACLOFEN 20 MG/1
20 TABLET ORAL
Qty: 90 | Refills: 11 | Status: ACTIVE | COMMUNITY
Start: 2023-04-17 | End: 1900-01-01

## 2024-06-05 DIAGNOSIS — R25.1 TREMOR, UNSPECIFIED: ICD-10-CM

## 2024-06-05 RX ORDER — PRIMIDONE 50 MG/1
50 TABLET ORAL
Qty: 30 | Refills: 3 | Status: ACTIVE | COMMUNITY
Start: 2024-06-05 | End: 1900-01-01

## 2024-06-06 ENCOUNTER — EMERGENCY (EMERGENCY)
Facility: HOSPITAL | Age: 49
LOS: 1 days | Discharge: HOME | End: 2024-06-06
Attending: STUDENT IN AN ORGANIZED HEALTH CARE EDUCATION/TRAINING PROGRAM | Admitting: STUDENT IN AN ORGANIZED HEALTH CARE EDUCATION/TRAINING PROGRAM
Payer: SELF-PAY

## 2024-06-06 VITALS
WEIGHT: 104.94 LBS | HEART RATE: 91 BPM | OXYGEN SATURATION: 97 % | TEMPERATURE: 98 F | RESPIRATION RATE: 19 BRPM | HEIGHT: 62 IN | DIASTOLIC BLOOD PRESSURE: 93 MMHG | SYSTOLIC BLOOD PRESSURE: 162 MMHG

## 2024-06-06 DIAGNOSIS — Z87.59 PERSONAL HISTORY OF OTHER COMPLICATIONS OF PREGNANCY, CHILDBIRTH AND THE PUERPERIUM: Chronic | ICD-10-CM

## 2024-06-06 PROCEDURE — 99284 EMERGENCY DEPT VISIT MOD MDM: CPT

## 2024-06-06 PROCEDURE — 99284 EMERGENCY DEPT VISIT MOD MDM: CPT | Mod: 25

## 2024-06-06 PROCEDURE — 70450 CT HEAD/BRAIN W/O DYE: CPT | Mod: MC

## 2024-06-06 PROCEDURE — 72125 CT NECK SPINE W/O DYE: CPT | Mod: 26,MC

## 2024-06-06 PROCEDURE — 70450 CT HEAD/BRAIN W/O DYE: CPT | Mod: 26,MC

## 2024-06-06 PROCEDURE — 72125 CT NECK SPINE W/O DYE: CPT | Mod: MC

## 2024-06-06 RX ORDER — BACLOFEN 100 %
0 POWDER (GRAM) MISCELLANEOUS
Refills: 0 | DISCHARGE

## 2024-06-06 RX ORDER — ACETAMINOPHEN 500 MG
650 TABLET ORAL ONCE
Refills: 0 | Status: COMPLETED | OUTPATIENT
Start: 2024-06-06 | End: 2024-06-06

## 2024-06-06 RX ORDER — GABAPENTIN 400 MG/1
1 CAPSULE ORAL
Qty: 0 | Refills: 0 | DISCHARGE

## 2024-06-06 RX ORDER — OMEGA-3 ACID ETHYL ESTERS 1 G
1 CAPSULE ORAL
Qty: 0 | Refills: 0 | DISCHARGE

## 2024-06-06 RX ORDER — IBUPROFEN 200 MG
3 TABLET ORAL
Qty: 0 | Refills: 0 | DISCHARGE

## 2024-06-06 RX ADMIN — Medication 650 MILLIGRAM(S): at 18:10

## 2024-06-06 NOTE — ED PROVIDER NOTE - PHYSICAL EXAMINATION
VITAL SIGNS: I have reviewed nursing notes and confirm.   GEN: Well-developed; well-nourished; in no acute distress. Speaking full sentences. GCS 15  SKIN: Warm, pink, no rash, no diaphoresis, no cyanosis, well perfused.   HEAD: Normocephalic; atraumatic. No scalp lacerations, no abrasions.  NECK: Supple; non tender.  NO midline ttp,  NO step offs,  Full ROM w/o pain.  EYES: Pupils 3mm equal, round, reactive to light and accomodation, conjunctiva and sclera clear. Extra-ocular movements intact bilaterally.  ENT: No nasal discharge; airway clear. Trachea is midline. ORAL: No oropharyngeal exudates or erythema. Normal dentition.  CV: RRR. S1, S2 normal; no murmurs, gallops, or rubs. Capillary refill < 2 seconds throughout. Distal pulses intact 2+ throughout.  NO chest wall TTP.  RESP: CTA bilaterally. No wheezes, rales, or rhonchi.   ABD: Normal bowel sounds, soft, non-distended, non-tender, no rebound, no guarding, no rigidity, no hepatosplenomegaly.  MSK: Normal range of motion and movement of all 4 extremities. No joint or muscular pain throughout. No clubbing.    BACK:  NO thoracolumbar midline TTP,  NO parathoracic TTP,  NO paralumbar TTP. No step-offs or obvious deformities.   NEURO: Alert & oriented x 3, Chronic 4/5 strength in left ue/le. 5/5 strength in right ue/le. Gait: Fluid. Normal speech and coordination.
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
## 2024-06-06 NOTE — ED ADULT NURSE NOTE - NSFALLUNIVINTERV_ED_ALL_ED
Bed/Stretcher in lowest position, wheels locked, appropriate side rails in place/Call bell, personal items and telephone in reach/Instruct patient to call for assistance before getting out of bed/chair/stretcher/Non-slip footwear applied when patient is off stretcher/[place] to call system/Physically safe environment - no spills, clutter or unnecessary equipment/Purposeful proactive rounding/Room/bathroom lighting operational, light cord in reach
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
## 2024-06-06 NOTE — ED PROVIDER NOTE - PATIENT PORTAL LINK FT
You can access the FollowMyHealth Patient Portal offered by [facility] by registering at the following website: [email or web address]. By joining [employer] [facility]’s FollowMyHealth portal, you will also be able to view your health information using other applications (apps) compatible with our system.

## 2024-06-07 ENCOUNTER — APPOINTMENT (OUTPATIENT)
Dept: MRI IMAGING | Facility: HOSPITAL | Age: 49
End: 2024-06-07

## 2024-06-14 ENCOUNTER — INPATIENT (INPATIENT)
Facility: HOSPITAL | Age: 49
LOS: 0 days | Discharge: HOME | DRG: 60 | End: 2024-06-15
Attending: PSYCHIATRY & NEUROLOGY | Admitting: PSYCHIATRY & NEUROLOGY
Payer: COMMERCIAL

## 2024-06-14 ENCOUNTER — APPOINTMENT (OUTPATIENT)
Dept: MRI IMAGING | Facility: CLINIC | Age: 49
End: 2024-06-14

## 2024-06-14 VITALS
HEIGHT: 62 IN | HEART RATE: 110 BPM | DIASTOLIC BLOOD PRESSURE: 84 MMHG | SYSTOLIC BLOOD PRESSURE: 131 MMHG | WEIGHT: 104.94 LBS | OXYGEN SATURATION: 99 % | RESPIRATION RATE: 20 BRPM | TEMPERATURE: 98 F

## 2024-06-14 DIAGNOSIS — G35 MULTIPLE SCLEROSIS: ICD-10-CM

## 2024-06-14 DIAGNOSIS — Z87.59 PERSONAL HISTORY OF OTHER COMPLICATIONS OF PREGNANCY, CHILDBIRTH AND THE PUERPERIUM: Chronic | ICD-10-CM

## 2024-06-14 LAB
BASOPHILS # BLD AUTO: 0.05 K/UL — SIGNIFICANT CHANGE UP (ref 0–0.2)
BASOPHILS NFR BLD AUTO: 0.5 % — SIGNIFICANT CHANGE UP (ref 0–2)
EOSINOPHIL # BLD AUTO: 0.72 K/UL — HIGH (ref 0–0.5)
EOSINOPHIL NFR BLD AUTO: 7.3 % — HIGH (ref 0–6)
HCT VFR BLD CALC: 43 % — SIGNIFICANT CHANGE UP (ref 34.5–45)
HGB BLD-MCNC: 14.3 G/DL — SIGNIFICANT CHANGE UP (ref 11.5–15.5)
IMM GRANULOCYTES NFR BLD AUTO: 0.3 % — SIGNIFICANT CHANGE UP (ref 0–0.9)
LYMPHOCYTES # BLD AUTO: 1.95 K/UL — SIGNIFICANT CHANGE UP (ref 1–3.3)
LYMPHOCYTES # BLD AUTO: 19.9 % — SIGNIFICANT CHANGE UP (ref 13–44)
MAGNESIUM SERPL-MCNC: 2.2 MG/DL — SIGNIFICANT CHANGE UP (ref 1.6–2.6)
MCHC RBC-ENTMCNC: 31.2 PG — SIGNIFICANT CHANGE UP (ref 27–34)
MCHC RBC-ENTMCNC: 33.3 GM/DL — SIGNIFICANT CHANGE UP (ref 32–36)
MCV RBC AUTO: 93.9 FL — SIGNIFICANT CHANGE UP (ref 80–100)
MONOCYTES # BLD AUTO: 1.12 K/UL — HIGH (ref 0–0.9)
MONOCYTES NFR BLD AUTO: 11.4 % — SIGNIFICANT CHANGE UP (ref 2–14)
NEUTROPHILS # BLD AUTO: 5.94 K/UL — SIGNIFICANT CHANGE UP (ref 1.8–7.4)
NEUTROPHILS NFR BLD AUTO: 60.6 % — SIGNIFICANT CHANGE UP (ref 43–77)
NRBC # BLD: 0 /100 WBCS — SIGNIFICANT CHANGE UP (ref 0–0)
NRBC BLD-RTO: 0 /100 WBCS — SIGNIFICANT CHANGE UP (ref 0–0)
PHOSPHATE SERPL-MCNC: 3.2 MG/DL — SIGNIFICANT CHANGE UP (ref 2.5–4.5)
PLATELET # BLD AUTO: 334 K/UL — SIGNIFICANT CHANGE UP (ref 150–400)
PROCALCITONIN SERPL-MCNC: 0.04 NG/ML — SIGNIFICANT CHANGE UP (ref 0.02–0.1)
RBC # BLD: 4.58 M/UL — SIGNIFICANT CHANGE UP (ref 3.8–5.2)
RBC # FLD: 11.9 % — SIGNIFICANT CHANGE UP (ref 10.3–14.5)
WBC # BLD: 9.81 K/UL — SIGNIFICANT CHANGE UP (ref 3.8–10.5)
WBC # FLD AUTO: 9.81 K/UL — SIGNIFICANT CHANGE UP (ref 3.8–10.5)

## 2024-06-14 PROCEDURE — 71046 X-RAY EXAM CHEST 2 VIEWS: CPT | Mod: 26

## 2024-06-14 PROCEDURE — 99285 EMERGENCY DEPT VISIT HI MDM: CPT

## 2024-06-14 PROCEDURE — 99222 1ST HOSP IP/OBS MODERATE 55: CPT | Mod: GC

## 2024-06-14 RX ORDER — TIZANIDINE 4 MG/1
4 TABLET ORAL
Refills: 0 | Status: DISCONTINUED | OUTPATIENT
Start: 2024-06-14 | End: 2024-06-14

## 2024-06-14 RX ORDER — ENOXAPARIN SODIUM 100 MG/ML
40 INJECTION SUBCUTANEOUS EVERY 24 HOURS
Refills: 0 | Status: DISCONTINUED | OUTPATIENT
Start: 2024-06-14 | End: 2024-06-15

## 2024-06-14 RX ORDER — ACETAMINOPHEN 500 MG/5ML
725 LIQUID (ML) ORAL ONCE
Refills: 0 | Status: COMPLETED | OUTPATIENT
Start: 2024-06-14 | End: 2024-06-14

## 2024-06-14 RX ORDER — TIZANIDINE 4 MG/1
4 TABLET ORAL
Refills: 0 | Status: DISCONTINUED | OUTPATIENT
Start: 2024-06-14 | End: 2024-06-15

## 2024-06-14 RX ORDER — AMITRIPTYLINE HYDROCHLORIDE 25 MG/1
25 TABLET, FILM COATED ORAL AT BEDTIME
Refills: 0 | Status: DISCONTINUED | OUTPATIENT
Start: 2024-06-14 | End: 2024-06-15

## 2024-06-14 RX ORDER — PRIMIDONE 50 MG
25 TABLET ORAL AT BEDTIME
Refills: 0 | Status: DISCONTINUED | OUTPATIENT
Start: 2024-06-14 | End: 2024-06-15

## 2024-06-14 RX ORDER — DALFAMPRIDINE 10 MG/1
10 TABLET, FILM COATED, EXTENDED RELEASE ORAL EVERY 12 HOURS
Refills: 0 | Status: DISCONTINUED | OUTPATIENT
Start: 2024-06-14 | End: 2024-06-15

## 2024-06-14 RX ORDER — ACETAMINOPHEN 500 MG/5ML
650 LIQUID (ML) ORAL EVERY 6 HOURS
Refills: 0 | Status: DISCONTINUED | OUTPATIENT
Start: 2024-06-14 | End: 2024-06-15

## 2024-06-14 RX ORDER — GABAPENTIN 400 MG/1
600 CAPSULE ORAL
Refills: 0 | Status: DISCONTINUED | OUTPATIENT
Start: 2024-06-14 | End: 2024-06-15

## 2024-06-14 RX ORDER — BACLOFEN 10 MG/20ML
20 INJECTION INTRATHECAL EVERY 8 HOURS
Refills: 0 | Status: DISCONTINUED | OUTPATIENT
Start: 2024-06-14 | End: 2024-06-15

## 2024-06-14 RX ORDER — AMLODIPINE BESYLATE 10 MG/1
5 TABLET ORAL DAILY
Refills: 0 | Status: DISCONTINUED | OUTPATIENT
Start: 2024-06-14 | End: 2024-06-15

## 2024-06-14 RX ADMIN — BACLOFEN 20 MILLIGRAM(S): 10 INJECTION INTRATHECAL at 21:24

## 2024-06-14 RX ADMIN — AMLODIPINE BESYLATE 5 MILLIGRAM(S): 10 TABLET ORAL at 21:23

## 2024-06-14 RX ADMIN — Medication 290 MILLIGRAM(S): at 16:32

## 2024-06-14 RX ADMIN — GABAPENTIN 600 MILLIGRAM(S): 400 CAPSULE ORAL at 21:24

## 2024-06-14 RX ADMIN — AMITRIPTYLINE HYDROCHLORIDE 25 MILLIGRAM(S): 25 TABLET, FILM COATED ORAL at 21:52

## 2024-06-14 RX ADMIN — Medication 25 MILLIGRAM(S): at 21:23

## 2024-06-15 ENCOUNTER — TRANSCRIPTION ENCOUNTER (OUTPATIENT)
Age: 49
End: 2024-06-15

## 2024-06-15 VITALS
OXYGEN SATURATION: 98 % | SYSTOLIC BLOOD PRESSURE: 126 MMHG | TEMPERATURE: 98 F | HEART RATE: 93 BPM | DIASTOLIC BLOOD PRESSURE: 92 MMHG | RESPIRATION RATE: 18 BRPM

## 2024-06-15 LAB
ANION GAP SERPL CALC-SCNC: 12 MMOL/L — SIGNIFICANT CHANGE UP (ref 5–17)
APPEARANCE UR: ABNORMAL
BACTERIA # UR AUTO: NEGATIVE /HPF — SIGNIFICANT CHANGE UP
BILIRUB UR-MCNC: NEGATIVE — SIGNIFICANT CHANGE UP
BUN SERPL-MCNC: 13 MG/DL — SIGNIFICANT CHANGE UP (ref 7–23)
CALCIUM SERPL-MCNC: 9 MG/DL — SIGNIFICANT CHANGE UP (ref 8.4–10.5)
CAST: 0 /LPF — SIGNIFICANT CHANGE UP (ref 0–4)
CHLORIDE SERPL-SCNC: 104 MMOL/L — SIGNIFICANT CHANGE UP (ref 96–108)
CO2 SERPL-SCNC: 24 MMOL/L — SIGNIFICANT CHANGE UP (ref 22–31)
COLOR SPEC: YELLOW — SIGNIFICANT CHANGE UP
CREAT SERPL-MCNC: 0.74 MG/DL — SIGNIFICANT CHANGE UP (ref 0.5–1.3)
DIFF PNL FLD: NEGATIVE — SIGNIFICANT CHANGE UP
EGFR: 98 ML/MIN/1.73M2 — SIGNIFICANT CHANGE UP
EGFR: 98 ML/MIN/1.73M2 — SIGNIFICANT CHANGE UP
FOLATE SERPL-MCNC: 15.5 NG/ML — SIGNIFICANT CHANGE UP
GLUCOSE SERPL-MCNC: 85 MG/DL — SIGNIFICANT CHANGE UP (ref 70–99)
GLUCOSE UR QL: NEGATIVE MG/DL — SIGNIFICANT CHANGE UP
HCT VFR BLD CALC: 36.4 % — SIGNIFICANT CHANGE UP (ref 34.5–45)
HGB BLD-MCNC: 12.4 G/DL — SIGNIFICANT CHANGE UP (ref 11.5–15.5)
KETONES UR-MCNC: NEGATIVE MG/DL — SIGNIFICANT CHANGE UP
LEUKOCYTE ESTERASE UR-ACNC: NEGATIVE — SIGNIFICANT CHANGE UP
MCHC RBC-ENTMCNC: 31.5 PG — SIGNIFICANT CHANGE UP (ref 27–34)
MCHC RBC-ENTMCNC: 34.1 GM/DL — SIGNIFICANT CHANGE UP (ref 32–36)
MCV RBC AUTO: 92.4 FL — SIGNIFICANT CHANGE UP (ref 80–100)
NITRITE UR-MCNC: NEGATIVE — SIGNIFICANT CHANGE UP
NRBC # BLD: 0 /100 WBCS — SIGNIFICANT CHANGE UP (ref 0–0)
NRBC BLD-RTO: 0 /100 WBCS — SIGNIFICANT CHANGE UP (ref 0–0)
PH UR: 6.5 — SIGNIFICANT CHANGE UP (ref 5–8)
PLATELET # BLD AUTO: 294 K/UL — SIGNIFICANT CHANGE UP (ref 150–400)
POTASSIUM SERPL-MCNC: 3.7 MMOL/L — SIGNIFICANT CHANGE UP (ref 3.5–5.3)
POTASSIUM SERPL-SCNC: 3.7 MMOL/L — SIGNIFICANT CHANGE UP (ref 3.5–5.3)
PROT UR-MCNC: NEGATIVE MG/DL — SIGNIFICANT CHANGE UP
RBC # BLD: 3.94 M/UL — SIGNIFICANT CHANGE UP (ref 3.8–5.2)
RBC # FLD: 11.9 % — SIGNIFICANT CHANGE UP (ref 10.3–14.5)
RBC CASTS # UR COMP ASSIST: 5 /HPF — HIGH (ref 0–4)
REVIEW: SIGNIFICANT CHANGE UP
SODIUM SERPL-SCNC: 140 MMOL/L — SIGNIFICANT CHANGE UP (ref 135–145)
SP GR SPEC: 1.02 — SIGNIFICANT CHANGE UP (ref 1–1.03)
SQUAMOUS # UR AUTO: 6 /HPF — HIGH (ref 0–5)
T4 AB SER-ACNC: 8.6 UG/DL — SIGNIFICANT CHANGE UP (ref 4.6–12)
TSH SERPL-MCNC: 0.67 UIU/ML — SIGNIFICANT CHANGE UP (ref 0.27–4.2)
UROBILINOGEN FLD QL: 0.2 MG/DL — SIGNIFICANT CHANGE UP (ref 0.2–1)
VIT B12 SERPL-MCNC: 813 PG/ML — SIGNIFICANT CHANGE UP (ref 232–1245)
WBC # BLD: 6.25 K/UL — SIGNIFICANT CHANGE UP (ref 3.8–10.5)
WBC # FLD AUTO: 6.25 K/UL — SIGNIFICANT CHANGE UP (ref 3.8–10.5)
WBC UR QL: 1 /HPF — SIGNIFICANT CHANGE UP (ref 0–5)

## 2024-06-15 PROCEDURE — 72156 MRI NECK SPINE W/O & W/DYE: CPT | Mod: 26

## 2024-06-15 PROCEDURE — 97166 OT EVAL MOD COMPLEX 45 MIN: CPT

## 2024-06-15 PROCEDURE — 99285 EMERGENCY DEPT VISIT HI MDM: CPT

## 2024-06-15 PROCEDURE — 72158 MRI LUMBAR SPINE W/O & W/DYE: CPT | Mod: MC

## 2024-06-15 PROCEDURE — 96374 THER/PROPH/DIAG INJ IV PUSH: CPT

## 2024-06-15 PROCEDURE — 71046 X-RAY EXAM CHEST 2 VIEWS: CPT

## 2024-06-15 PROCEDURE — 80048 BASIC METABOLIC PNL TOTAL CA: CPT

## 2024-06-15 PROCEDURE — 84145 PROCALCITONIN (PCT): CPT

## 2024-06-15 PROCEDURE — 84443 ASSAY THYROID STIM HORMONE: CPT

## 2024-06-15 PROCEDURE — 72158 MRI LUMBAR SPINE W/O & W/DYE: CPT | Mod: 26

## 2024-06-15 PROCEDURE — 87086 URINE CULTURE/COLONY COUNT: CPT

## 2024-06-15 PROCEDURE — 82607 VITAMIN B-12: CPT

## 2024-06-15 PROCEDURE — 82746 ASSAY OF FOLIC ACID SERUM: CPT

## 2024-06-15 PROCEDURE — 72156 MRI NECK SPINE W/O & W/DYE: CPT | Mod: MC

## 2024-06-15 PROCEDURE — A9585: CPT

## 2024-06-15 PROCEDURE — 83735 ASSAY OF MAGNESIUM: CPT

## 2024-06-15 PROCEDURE — 84436 ASSAY OF TOTAL THYROXINE: CPT

## 2024-06-15 PROCEDURE — 72157 MRI CHEST SPINE W/O & W/DYE: CPT | Mod: 26

## 2024-06-15 PROCEDURE — 72157 MRI CHEST SPINE W/O & W/DYE: CPT | Mod: MC

## 2024-06-15 PROCEDURE — 97161 PT EVAL LOW COMPLEX 20 MIN: CPT

## 2024-06-15 PROCEDURE — 85025 COMPLETE CBC W/AUTO DIFF WBC: CPT

## 2024-06-15 PROCEDURE — 85027 COMPLETE CBC AUTOMATED: CPT

## 2024-06-15 PROCEDURE — 84100 ASSAY OF PHOSPHORUS: CPT

## 2024-06-15 PROCEDURE — 36415 COLL VENOUS BLD VENIPUNCTURE: CPT

## 2024-06-15 PROCEDURE — 84425 ASSAY OF VITAMIN B-1: CPT

## 2024-06-15 PROCEDURE — 81001 URINALYSIS AUTO W/SCOPE: CPT

## 2024-06-15 RX ORDER — ENOXAPARIN SODIUM 100 MG/ML
30 INJECTION SUBCUTANEOUS EVERY 24 HOURS
Refills: 0 | Status: DISCONTINUED | OUTPATIENT
Start: 2024-06-15 | End: 2024-06-15

## 2024-06-15 RX ADMIN — GABAPENTIN 600 MILLIGRAM(S): 400 CAPSULE ORAL at 18:04

## 2024-06-15 RX ADMIN — BACLOFEN 20 MILLIGRAM(S): 10 INJECTION INTRATHECAL at 07:00

## 2024-06-15 RX ADMIN — GABAPENTIN 600 MILLIGRAM(S): 400 CAPSULE ORAL at 06:59

## 2024-06-15 RX ADMIN — BACLOFEN 20 MILLIGRAM(S): 10 INJECTION INTRATHECAL at 11:20

## 2024-06-17 LAB
CULTURE RESULTS: ABNORMAL
SPECIMEN SOURCE: SIGNIFICANT CHANGE UP

## 2024-06-19 LAB — VIT B1 SERPL-MCNC: 104.8 NMOL/L — SIGNIFICANT CHANGE UP (ref 66.5–200)

## 2024-06-20 PROBLEM — G62.9 POLYNEUROPATHY, UNSPECIFIED: Chronic | Status: ACTIVE | Noted: 2024-06-14

## 2024-06-20 PROBLEM — I10 ESSENTIAL (PRIMARY) HYPERTENSION: Chronic | Status: ACTIVE | Noted: 2024-06-14

## 2024-06-20 PROBLEM — M54.16 RADICULOPATHY, LUMBAR REGION: Chronic | Status: ACTIVE | Noted: 2024-06-14

## 2024-06-20 PROBLEM — G35 MULTIPLE SCLEROSIS: Chronic | Status: ACTIVE | Noted: 2024-06-14

## 2024-06-20 PROBLEM — M62.830 MUSCLE SPASM OF BACK: Chronic | Status: ACTIVE | Noted: 2024-06-14

## 2024-06-21 ENCOUNTER — APPOINTMENT (OUTPATIENT)
Dept: NEUROLOGY | Facility: CLINIC | Age: 49
End: 2024-06-21
Payer: COMMERCIAL

## 2024-06-21 VITALS
BODY MASS INDEX: 18.95 KG/M2 | HEIGHT: 62 IN | WEIGHT: 103 LBS | DIASTOLIC BLOOD PRESSURE: 85 MMHG | HEART RATE: 93 BPM | SYSTOLIC BLOOD PRESSURE: 137 MMHG

## 2024-06-21 DIAGNOSIS — F07.81 POSTCONCUSSIONAL SYNDROME: ICD-10-CM

## 2024-06-21 DIAGNOSIS — M47.816 SPONDYLOSIS W/OUT MYELOPATHY OR RADICULOPATHY, LUMBAR REGION: ICD-10-CM

## 2024-06-21 DIAGNOSIS — G35 MULTIPLE SCLEROSIS: ICD-10-CM

## 2024-06-21 PROCEDURE — G2211 COMPLEX E/M VISIT ADD ON: CPT | Mod: NC,1L

## 2024-06-21 PROCEDURE — 99496 TRANSJ CARE MGMT HIGH F2F 7D: CPT

## 2024-06-21 RX ORDER — DEXTROAMPHETAMINE SACCHARATE, AMPHETAMINE ASPARTATE, DEXTROAMPHETAMINE SULFATE AND AMPHETAMINE SULFATE 2.5; 2.5; 2.5; 2.5 MG/1; MG/1; MG/1; MG/1
10 TABLET ORAL
Qty: 60 | Refills: 0 | Status: ACTIVE | COMMUNITY
Start: 2023-07-27 | End: 1900-01-01

## 2024-06-21 NOTE — PHYSICAL EXAM
[FreeTextEntry1] : PHYSICAL EXAM Constitutional: Alert, no acute distress Psychiatric: appropriate affect and mood Pulmonary: No respiratory distress, stable on room air  NEUROLOGICAL EXAM Mental status: The patient is alert, attentive and conversational memory intact. Speech/language: Mild dysarthria Cranial nerves: CN II: ? RAPD OS CN III, IV, VI: EOMI, no nystagmus, no ptosis CN V: Facial sensation is intact to pinprick in all 3 divisions bilaterally. CN VII: Face is symmetric with normal eye closure and smile. CN VII: Hearing is normal to rubbing fingers CN IX, X: Palate elevates symmetrically. Phonation is normal. CN XI: Head turning and shoulder shrug are intact CN XII: Tongue is midline with normal movements and no atrophy. Motor: B/l UE 4 -> 4+/5 throughout.   RLE: 4/5 HF, 5/5 KE/KF, 4+/5 DF and 4+/5 PF  LLE: 3/5 HF, 5/5 KE, 2/5 DF, 4-/5 PF Reflexes: R L  Biceps 3+ 3+  Patellar 3+ 3+  Achilles 2+ 2+  Plantar responses- Mute b/l Sensory: Sensations intact to LT UE and LE. Coordination: Dysmetria on FNF and HTS b/l (L>R). Mild Head tremor and b/l action/postural hand tremors. Gait/Stance: Wide based, ataxic gait. Truncal ataxia. Can barely take 2 steps unassisted due to lower back pain and stiffness.    T25FW: 9/7/2023- 18.48 sec.

## 2024-06-21 NOTE — DATA REVIEWED
[de-identified] : MR C/T/L spine ww/o 6/15/24: vague patchy demyelinating lesions throughout cord with mild volume loss in C cord most notable at C4/5, no definite new cord lesions or enhancement on my review. L3/4 mild canal stenosis with R mod NF narrowing and L4/5 mild canal stenosis and possible impingement of L L5 nerve root.  MRI brain w/w/o 7/2023 (compared w/ 4/28/2022) - stable, moderate demyelinating disease.  Last brain MRI 4/2022 and C/T spine 11/2022- stable per patient at [facility]. (reviewed reports)- MRI brain 4/2022- stable disease burden compared to 9/2021. No active lesions MRI C and T spine 11/2022- stable c/w 9/2021. Diffuse/patchy lesions from C1/C2 to T2/T3, C5-T5/6 and scattered T7-T8-T10, ? T2 focal cord atrophy.
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
## 2024-06-27 ENCOUNTER — APPOINTMENT (OUTPATIENT)
Dept: NEUROLOGY | Facility: CLINIC | Age: 49
End: 2024-06-27

## 2024-07-03 DIAGNOSIS — M96.1 POSTLAMINECTOMY SYNDROME, NOT ELSEWHERE CLASSIFIED: ICD-10-CM

## 2024-07-05 DIAGNOSIS — G35 MULTIPLE SCLEROSIS: ICD-10-CM

## 2024-07-05 DIAGNOSIS — R25.2 CRAMP AND SPASM: ICD-10-CM

## 2024-07-11 ENCOUNTER — APPOINTMENT (OUTPATIENT)
Dept: PHYSICAL MEDICINE AND REHAB | Facility: CLINIC | Age: 49
End: 2024-07-11
Payer: COMMERCIAL

## 2024-07-11 DIAGNOSIS — M96.1 POSTLAMINECTOMY SYNDROME, NOT ELSEWHERE CLASSIFIED: ICD-10-CM

## 2024-07-11 DIAGNOSIS — M54.12 RADICULOPATHY, CERVICAL REGION: ICD-10-CM

## 2024-07-11 DIAGNOSIS — G89.4 CHRONIC PAIN SYNDROME: ICD-10-CM

## 2024-07-11 DIAGNOSIS — M79.18 MYALGIA, OTHER SITE: ICD-10-CM

## 2024-07-11 DIAGNOSIS — G89.29 MYALGIA, OTHER SITE: ICD-10-CM

## 2024-07-11 DIAGNOSIS — S06.0X0A CONCUSSION W/OUT LOSS OF CONSCIOUSNESS, INITIAL ENCOUNTER: ICD-10-CM

## 2024-07-11 PROCEDURE — 99215 OFFICE O/P EST HI 40 MIN: CPT

## 2024-07-11 RX ORDER — OXYCODONE 5 MG/1
5 TABLET ORAL
Qty: 14 | Refills: 0 | Status: ACTIVE | COMMUNITY
Start: 2024-07-11 | End: 1900-01-01

## 2024-07-22 ENCOUNTER — NON-APPOINTMENT (OUTPATIENT)
Age: 49
End: 2024-07-22

## 2024-07-24 ENCOUNTER — NON-APPOINTMENT (OUTPATIENT)
Age: 49
End: 2024-07-24

## 2024-07-25 ENCOUNTER — APPOINTMENT (OUTPATIENT)
Dept: NEUROSURGERY | Facility: CLINIC | Age: 49
End: 2024-07-25
Payer: COMMERCIAL

## 2024-07-25 VITALS
HEIGHT: 62 IN | DIASTOLIC BLOOD PRESSURE: 89 MMHG | WEIGHT: 103 LBS | OXYGEN SATURATION: 96 % | SYSTOLIC BLOOD PRESSURE: 134 MMHG | HEART RATE: 85 BPM | BODY MASS INDEX: 18.95 KG/M2

## 2024-07-25 DIAGNOSIS — F07.81 POSTCONCUSSIONAL SYNDROME: ICD-10-CM

## 2024-07-25 PROCEDURE — 99205 OFFICE O/P NEW HI 60 MIN: CPT

## 2024-07-26 ENCOUNTER — APPOINTMENT (OUTPATIENT)
Dept: PHYSICAL MEDICINE AND REHAB | Facility: CLINIC | Age: 49
End: 2024-07-26

## 2024-07-29 NOTE — PHYSICAL EXAM
[General Appearance - Alert] : alert [General Appearance - In No Acute Distress] : in no acute distress [General Appearance - Well Nourished] : well nourished [General Appearance - Well-Appearing] : healthy appearing [Oriented To Time, Place, And Person] : oriented to person, place, and time [Impaired Insight] : insight and judgment were intact [Affect] : the affect was normal [Memory Recent] : recent memory was not impaired [Person] : oriented to person [Place] : oriented to place [Time] : oriented to time [Short Term Intact] : short term memory intact [Cranial Nerves Optic (II)] : visual acuity intact bilaterally,  pupils equal round and reactive to light [Cranial Nerves Oculomotor (III)] : extraocular motion intact [Cranial Nerves Trigeminal (V)] : facial sensation intact symmetrically [Cranial Nerves Facial (VII)] : face symmetrical [Cranial Nerves Vestibulocochlear (VIII)] : hearing was intact bilaterally [Cranial Nerves Accessory (XI - Cranial And Spinal)] : head turning and shoulder shrug symmetric [Cranial Nerves Hypoglossal (XII)] : there was no tongue deviation with protrusion [Motor Tone] : muscle tone was normal in all four extremities [Sclera] : the sclera and conjunctiva were normal [PERRL With Normal Accommodation] : pupils were equal in size, round, reactive to light, with normal accommodation [Outer Ear] : the ears and nose were normal in appearance [Both Tympanic Membranes Were Examined] : both tympanic membranes were normal [Neck Appearance] : the appearance of the neck was normal [] : no respiratory distress [Respiration, Rhythm And Depth] : normal respiratory rhythm and effort [Apical Impulse] : the apical impulse was normal [Heart Rate And Rhythm] : heart rate was normal and rhythm regular [No Spinal Tenderness] : no spinal tenderness [Involuntary Movements] : no involuntary movements were seen [FreeTextEntry8] : walk slowly with walker
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
## 2024-07-29 NOTE — RESULTS/DATA
[FreeTextEntry1] : ACC: [id number]     EXAM:  CT CERVICAL SPINE   ORDERED BY: MELISSA DAIGLE  ACC: [id number]     EXAM:  CT BRAIN   ORDERED BY: MELISSA DAIGLE  PROCEDURE DATE:  06/06/2024    INTERPRETATION:  INDICATION: Headache with neck pain status post trauma. TECHNIQUE:  A non contrast 2.5mm axial CT study of the brain was performed from skull base to vertex. Coronal and sagittal reformations were generated from the axial data. COMPARISON EXAMINATION:  MR brain 4/28/2022.  FINDINGS:  HEMISPHERES:  No mass or space occupying lesion.  No acute ischemic changes or hemorrhagic foci are suggested. VENTRICLES:  Midline and normal in size. POSTERIOR FOSSA:  The brain stem and cerebellum are unremarkable.  No CP angle lesion noted. EXTRACEREBRAL SPACES:  No subdural or epidural collections are noted. SKULL BASE AND CALVARIUM:  Appears intact.  No fracture or destructive lesion is identified. SINUSES AND MASTOIDS:  There is mild mucosal thickening in the ethmoid sinuses. The left maxillary sinus is clear as compared to prior MR. MISCELLANEOUS: No orbital or pituitary abnormality noted.  CT CERVICAL  INDICATIONS:  neck pain. Trauma. TECHNIQUE:  Thin section CT imaging was conducted.  3-D, Coronal and sagittal reformations were generated from the axial data.  FINDINGS:  There is alteration of the cervical lordosis which may reflect positioning or spasm.  C1/C2  :  The anterior and posterior arches of C1 appear to be intact. There is no C1-C2 subluxation. No odontoid fracture is noted. Base of C2 appears to be intact  The mid and lower cervical vertebral bodies appear to be intact. No upper thoracic fracture is noted.  Degenerative changes and disc space narrowing is noted most prominent at C4-5 and C5-6.  Prevertebral soft tissues are unremarkable.   BRAIN IMPRESSION:  1)  unremarkable CT study of the brain 2)  no intracerebral hemorrhage, contusion, or extracerebral hemorrhagic collections identified.  CERVICAL IMPRESSION:  Degenerative changes in the mid and lower cervical spine without significant stenosis. No acute fracture identified.
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
## 2024-07-29 NOTE — REVIEW OF SYSTEMS
[Arm Weakness] : arm weakness [Leg Weakness] : leg weakness [Dizziness] : dizziness [Lightheadedness] : lightheadedness [Vertigo] : vertigo [Cluster Headache] : cluster headaches [Limb Pain] : limb pain [Negative] : Genitourinary
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
## 2024-07-29 NOTE — RESULTS/DATA
[FreeTextEntry1] : ACC: [id number]     EXAM:  CT CERVICAL SPINE   ORDERED BY: MELISSA DAIGLE  ACC: [id number]     EXAM:  CT BRAIN   ORDERED BY: MELISSA DAIGLE  PROCEDURE DATE:  06/06/2024    INTERPRETATION:  INDICATION: Headache with neck pain status post trauma. TECHNIQUE:  A non contrast 2.5mm axial CT study of the brain was performed from skull base to vertex. Coronal and sagittal reformations were generated from the axial data. COMPARISON EXAMINATION:  MR brain 4/28/2022.  FINDINGS:  HEMISPHERES:  No mass or space occupying lesion.  No acute ischemic changes or hemorrhagic foci are suggested. VENTRICLES:  Midline and normal in size. POSTERIOR FOSSA:  The brain stem and cerebellum are unremarkable.  No CP angle lesion noted. EXTRACEREBRAL SPACES:  No subdural or epidural collections are noted. SKULL BASE AND CALVARIUM:  Appears intact.  No fracture or destructive lesion is identified. SINUSES AND MASTOIDS:  There is mild mucosal thickening in the ethmoid sinuses. The left maxillary sinus is clear as compared to prior MR. MISCELLANEOUS: No orbital or pituitary abnormality noted.  CT CERVICAL  INDICATIONS:  neck pain. Trauma. TECHNIQUE:  Thin section CT imaging was conducted.  3-D, Coronal and sagittal reformations were generated from the axial data.  FINDINGS:  There is alteration of the cervical lordosis which may reflect positioning or spasm.  C1/C2  :  The anterior and posterior arches of C1 appear to be intact. There is no C1-C2 subluxation. No odontoid fracture is noted. Base of C2 appears to be intact  The mid and lower cervical vertebral bodies appear to be intact. No upper thoracic fracture is noted.  Degenerative changes and disc space narrowing is noted most prominent at C4-5 and C5-6.  Prevertebral soft tissues are unremarkable.   BRAIN IMPRESSION:  1)  unremarkable CT study of the brain 2)  no intracerebral hemorrhage, contusion, or extracerebral hemorrhagic collections identified.  CERVICAL IMPRESSION:  Degenerative changes in the mid and lower cervical spine without significant stenosis. No acute fracture identified.
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
## 2024-07-29 NOTE — RESULTS/DATA
[FreeTextEntry1] : ACC: [id number]     EXAM:  CT CERVICAL SPINE   ORDERED BY: MELISSA DAIGLE  ACC: [id number]     EXAM:  CT BRAIN   ORDERED BY: MELISSA DAIGLE  PROCEDURE DATE:  06/06/2024    INTERPRETATION:  INDICATION: Headache with neck pain status post trauma. TECHNIQUE:  A non contrast 2.5mm axial CT study of the brain was performed from skull base to vertex. Coronal and sagittal reformations were generated from the axial data. COMPARISON EXAMINATION:  MR brain 4/28/2022.  FINDINGS:  HEMISPHERES:  No mass or space occupying lesion.  No acute ischemic changes or hemorrhagic foci are suggested. VENTRICLES:  Midline and normal in size. POSTERIOR FOSSA:  The brain stem and cerebellum are unremarkable.  No CP angle lesion noted. EXTRACEREBRAL SPACES:  No subdural or epidural collections are noted. SKULL BASE AND CALVARIUM:  Appears intact.  No fracture or destructive lesion is identified. SINUSES AND MASTOIDS:  There is mild mucosal thickening in the ethmoid sinuses. The left maxillary sinus is clear as compared to prior MR. MISCELLANEOUS: No orbital or pituitary abnormality noted.  CT CERVICAL  INDICATIONS:  neck pain. Trauma. TECHNIQUE:  Thin section CT imaging was conducted.  3-D, Coronal and sagittal reformations were generated from the axial data.  FINDINGS:  There is alteration of the cervical lordosis which may reflect positioning or spasm.  C1/C2  :  The anterior and posterior arches of C1 appear to be intact. There is no C1-C2 subluxation. No odontoid fracture is noted. Base of C2 appears to be intact  The mid and lower cervical vertebral bodies appear to be intact. No upper thoracic fracture is noted.  Degenerative changes and disc space narrowing is noted most prominent at C4-5 and C5-6.  Prevertebral soft tissues are unremarkable.   BRAIN IMPRESSION:  1)  unremarkable CT study of the brain 2)  no intracerebral hemorrhage, contusion, or extracerebral hemorrhagic collections identified.  CERVICAL IMPRESSION:  Degenerative changes in the mid and lower cervical spine without significant stenosis. No acute fracture identified.
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
## 2024-07-31 DIAGNOSIS — F41.9 ANXIETY DISORDER, UNSPECIFIED: ICD-10-CM

## 2024-07-31 RX ORDER — ALPRAZOLAM 0.25 MG/1
0.25 TABLET ORAL
Qty: 2 | Refills: 0 | Status: ACTIVE | COMMUNITY
Start: 2024-07-31 | End: 1900-01-01

## 2024-08-02 ENCOUNTER — APPOINTMENT (OUTPATIENT)
Dept: PHYSICAL MEDICINE AND REHAB | Facility: CLINIC | Age: 49
End: 2024-08-02
Payer: COMMERCIAL

## 2024-08-02 ENCOUNTER — OUTPATIENT (OUTPATIENT)
Dept: OUTPATIENT SERVICES | Facility: HOSPITAL | Age: 49
LOS: 1 days | End: 2024-08-02
Payer: COMMERCIAL

## 2024-08-02 DIAGNOSIS — M96.1 POSTLAMINECTOMY SYNDROME, NOT ELSEWHERE CLASSIFIED: ICD-10-CM

## 2024-08-02 DIAGNOSIS — M54.16 RADICULOPATHY, LUMBAR REGION: ICD-10-CM

## 2024-08-02 DIAGNOSIS — Z87.59 PERSONAL HISTORY OF OTHER COMPLICATIONS OF PREGNANCY, CHILDBIRTH AND THE PUERPERIUM: Chronic | ICD-10-CM

## 2024-08-02 PROCEDURE — 62323 NJX INTERLAMINAR LMBR/SAC: CPT

## 2024-08-02 NOTE — PROCEDURE
[de-identified] : [facility]  [place], [place], [address] - [phone]   PATIENT: SUGAR CESPEDES MEDICAL RECORD: [id number] DATE OF PROCEDURE: 2024   PHYSICIAN: Benoit Anna DO  CAUDAL EPIDURAL STEROID INJECTION WITH FLUOROSCOPIC GUIDANCE   Injectate: 80mg/mL methylPrednisolone and 0.25% Bupivacaine 1mL and 3mL Normal Saline Preservative Free  Complications: None  Estimated Blood Loss: None  Technique: After informed consent was obtained, the patient was taken to the operating room and placed in the prone position. All pressure points were supported and this procedure was done under local anesthesia. A time-out procedure was performed and patient's name, , MRN and procedure being performed along with pertinent medical history was verified. The lumbosacral region was then exposed and prepped with chlorhexidine and draped in a sterile manner. The entirety of the procedure was done under sterile technique using sterile gloves, surgical mask and cap. All medication expiration dates were verified prior to being drawn into syringes.    Using first AP, then lateral fluoroscopy, the sacral hiatus was identified. Following this, 3 mL of preservative free 1% lidocaine was injected with a 25-gauge 1-1/2 inch needle for a skin wheal. Through that skin wheal, a 20-gauge 6-inch tuohy epidural needle was advanced to bony contact of the sacral hiatus under intermittent fluoroscopy, then advanced through sacrococcygeal ligament into the caudal epidural space. After negative aspiration of heme or CSF, 1 mL of Omnipaque 240 contrast was injected to delineate epidural spread under fluoroscopic view. Following this, again after negative aspiration for heme and CSF, the entirety of the above mentioned injectate was easily injected to the caudal epidural space without paresthesias or complications.    The patient was taken to recovery room in stable condition with bilateral lower extremity motor and sensation intact.

## 2024-08-06 DIAGNOSIS — M54.18 RADICULOPATHY, SACRAL AND SACROCOCCYGEAL REGION: ICD-10-CM

## 2024-08-09 ENCOUNTER — APPOINTMENT (OUTPATIENT)
Dept: PHYSICAL MEDICINE AND REHAB | Facility: CLINIC | Age: 49
End: 2024-08-09

## 2024-08-09 PROCEDURE — 64643 CHEMODENERV 1 EXTREM 1-4 EA: CPT

## 2024-08-09 PROCEDURE — 95874 GUIDE NERV DESTR NEEDLE EMG: CPT

## 2024-08-09 PROCEDURE — 64642 CHEMODENERV 1 EXTREMITY 1-4: CPT

## 2024-08-09 PROCEDURE — 99213 OFFICE O/P EST LOW 20 MIN: CPT | Mod: 25

## 2024-08-09 NOTE — PHYSICAL EXAM
[Normal] : Oriented to person, place, and time, insight and judgement were intact and the affect was normal [de-identified] : no distress [de-identified] : well perfused [de-identified] : soft [de-identified] : Amb w/o AD- + bl recurvatum [de-identified] : Motor nml grade throughout,  MAS 1+ spasticity
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
## 2024-08-09 NOTE — ASSESSMENT
[FreeTextEntry1] : - Post-injection instructions provided. Patient informed that should take 10-14 days prior to seeing improvement. Will notify me if there is any redness or discharge noted at injection sites. Ice and/or tyelenol prn muscle soreness. - Continue current therapies and f/u w neurology - Follow up in 6 weeks.

## 2024-08-09 NOTE — PROCEDURE
[Consent] : consent was given by patient or guardian [Site Verification] : the injection site was verified [Post-Injection Instructions Provided] : post-injection instructions were provided [] : EMG Guidance was used during the procedure [TWNoteComboBox1] : Medial Gastrocnemius [TWNoteComboBox2] : 150 Units [de-identified] : Medial Gastrocnemius [de-identified] : 100 Units [de-identified] : Lateral Gastrocnemius [de-identified] : 100 Units [de-identified] : Lateral Gastrocnemius [de-identified] : 50 Units

## 2024-08-15 ENCOUNTER — APPOINTMENT (OUTPATIENT)
Dept: NEUROSURGERY | Facility: CLINIC | Age: 49
End: 2024-08-15

## 2024-08-27 ENCOUNTER — APPOINTMENT (OUTPATIENT)
Dept: MRI IMAGING | Facility: CLINIC | Age: 49
End: 2024-08-27
Payer: MEDICARE

## 2024-08-27 ENCOUNTER — OUTPATIENT (OUTPATIENT)
Dept: OUTPATIENT SERVICES | Facility: HOSPITAL | Age: 49
LOS: 1 days | End: 2024-08-27
Payer: MEDICARE

## 2024-08-27 DIAGNOSIS — Z87.59 PERSONAL HISTORY OF OTHER COMPLICATIONS OF PREGNANCY, CHILDBIRTH AND THE PUERPERIUM: Chronic | ICD-10-CM

## 2024-08-27 DIAGNOSIS — G35 MULTIPLE SCLEROSIS: ICD-10-CM

## 2024-08-27 PROCEDURE — 70551 MRI BRAIN STEM W/O DYE: CPT

## 2024-08-27 PROCEDURE — 70551 MRI BRAIN STEM W/O DYE: CPT | Mod: 26,MH

## 2024-08-27 RX ORDER — SODIUM CHLORIDE 9 MG/ML
0.9 INJECTION, SOLUTION INTRAMUSCULAR; INTRAVENOUS; SUBCUTANEOUS
Refills: 0 | Status: COMPLETED | OUTPATIENT
Start: 2024-08-27

## 2024-08-27 RX ORDER — ONABOTULINUMTOXINA 200 [USP'U]/1
200 INJECTION, POWDER, LYOPHILIZED, FOR SOLUTION INTRADERMAL; INTRAMUSCULAR
Refills: 0 | Status: COMPLETED | OUTPATIENT
Start: 2024-08-27

## 2024-08-29 ENCOUNTER — TRANSCRIPTION ENCOUNTER (OUTPATIENT)
Age: 49
End: 2024-08-29

## 2024-08-29 ENCOUNTER — APPOINTMENT (OUTPATIENT)
Dept: PHYSICAL MEDICINE AND REHAB | Facility: CLINIC | Age: 49
End: 2024-08-29
Payer: COMMERCIAL

## 2024-08-29 DIAGNOSIS — F07.81 POSTCONCUSSIONAL SYNDROME: ICD-10-CM

## 2024-08-29 DIAGNOSIS — M79.18 MYALGIA, OTHER SITE: ICD-10-CM

## 2024-08-29 DIAGNOSIS — R53.81 OTHER MALAISE: ICD-10-CM

## 2024-08-29 DIAGNOSIS — G89.4 CHRONIC PAIN SYNDROME: ICD-10-CM

## 2024-08-29 DIAGNOSIS — M48.062 SPINAL STENOSIS, LUMBAR REGION WITH NEUROGENIC CLAUDICATION: ICD-10-CM

## 2024-08-29 DIAGNOSIS — M47.816 SPONDYLOSIS W/OUT MYELOPATHY OR RADICULOPATHY, LUMBAR REGION: ICD-10-CM

## 2024-08-29 DIAGNOSIS — M96.1 POSTLAMINECTOMY SYNDROME, NOT ELSEWHERE CLASSIFIED: ICD-10-CM

## 2024-08-29 DIAGNOSIS — G89.29 MYALGIA, OTHER SITE: ICD-10-CM

## 2024-08-29 PROCEDURE — 99214 OFFICE O/P EST MOD 30 MIN: CPT

## 2024-08-29 PROCEDURE — G3002: CPT

## 2024-08-29 RX ORDER — OXYCODONE 5 MG/1
5 TABLET ORAL DAILY
Qty: 30 | Refills: 0 | Status: ACTIVE | COMMUNITY
Start: 2024-08-29 | End: 1900-01-01
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
## 2024-08-29 NOTE — PHYSICAL EXAM
[FreeTextEntry1] : General exam   Constitutional: The patient appears well-developed, thin and in no apparent distress. Patient is well-groomed.    Skin: The skin is warm and dry, with normal turgor. Multiple psoriatic lesions.  Eyes: PERRL.    ENMT: Ears: Hearing is grossly within normal limits.    Neck: Supple: The neck is supple.    Respiratory: Inspection: Breathing unlabored.    Neurologic: Alert and oriented x 3.   Psychiatric: Patient is cooperative and appropriate.  Mood and affect are normal.  Patient's insight is good, and memory and judgment are intact.  Lumbar Skin c/d/i without any erythema, swelling, effusion  Diffuse tenderness Multiple trigger points In manual WC Decreased truncal muscle tone

## 2024-09-06 ENCOUNTER — APPOINTMENT (OUTPATIENT)
Dept: PHYSICAL MEDICINE AND REHAB | Facility: CLINIC | Age: 49
End: 2024-09-06
Payer: COMMERCIAL

## 2024-09-06 DIAGNOSIS — R26.81 UNSTEADINESS ON FEET: ICD-10-CM

## 2024-09-06 DIAGNOSIS — G35 MULTIPLE SCLEROSIS: ICD-10-CM

## 2024-09-06 PROCEDURE — 99213 OFFICE O/P EST LOW 20 MIN: CPT
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
## 2024-09-06 NOTE — PHYSICAL EXAM
[Normal] : Alert and in no acute distress [de-identified] : sitting in wheelchair  [de-identified] : breathing comfortably  [de-identified] : warm, well perfused  [de-identified] : moves all ext  [de-identified] : +psoriasis on elbows  [de-identified] : emotional
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
## 2024-09-09 ENCOUNTER — INPATIENT (INPATIENT)
Facility: HOSPITAL | Age: 49
LOS: 1 days | Discharge: NURSING, REHAB OR LONG-TERM CARE | DRG: 556 | End: 2024-09-11
Attending: HOSPITALIST | Admitting: STUDENT IN AN ORGANIZED HEALTH CARE EDUCATION/TRAINING PROGRAM
Payer: SELF-PAY

## 2024-09-09 VITALS
WEIGHT: 100.09 LBS | DIASTOLIC BLOOD PRESSURE: 85 MMHG | RESPIRATION RATE: 18 BRPM | HEIGHT: 63 IN | OXYGEN SATURATION: 99 % | SYSTOLIC BLOOD PRESSURE: 133 MMHG | HEART RATE: 98 BPM | TEMPERATURE: 99 F

## 2024-09-09 DIAGNOSIS — R26.2 DIFFICULTY IN WALKING, NOT ELSEWHERE CLASSIFIED: ICD-10-CM

## 2024-09-09 DIAGNOSIS — Z87.59 PERSONAL HISTORY OF OTHER COMPLICATIONS OF PREGNANCY, CHILDBIRTH AND THE PUERPERIUM: Chronic | ICD-10-CM

## 2024-09-09 LAB
ALBUMIN SERPL ELPH-MCNC: 4 G/DL — SIGNIFICANT CHANGE UP (ref 3.3–5)
ALP SERPL-CCNC: 115 U/L — SIGNIFICANT CHANGE UP (ref 40–120)
ALT FLD-CCNC: 19 U/L — SIGNIFICANT CHANGE UP (ref 10–45)
ANION GAP SERPL CALC-SCNC: 6 MMOL/L — SIGNIFICANT CHANGE UP (ref 5–17)
AST SERPL-CCNC: 13 U/L — SIGNIFICANT CHANGE UP (ref 10–40)
BASOPHILS # BLD AUTO: 0.05 K/UL — SIGNIFICANT CHANGE UP (ref 0–0.2)
BASOPHILS NFR BLD AUTO: 0.6 % — SIGNIFICANT CHANGE UP (ref 0–2)
BILIRUB SERPL-MCNC: 0.3 MG/DL — SIGNIFICANT CHANGE UP (ref 0.2–1.2)
BUN SERPL-MCNC: 15 MG/DL — SIGNIFICANT CHANGE UP (ref 7–23)
CALCIUM SERPL-MCNC: 9.5 MG/DL — SIGNIFICANT CHANGE UP (ref 8.4–10.5)
CHLORIDE SERPL-SCNC: 105 MMOL/L — SIGNIFICANT CHANGE UP (ref 96–108)
CO2 SERPL-SCNC: 32 MMOL/L — HIGH (ref 22–31)
CREAT SERPL-MCNC: 0.7 MG/DL — SIGNIFICANT CHANGE UP (ref 0.5–1.3)
EGFR: 105 ML/MIN/1.73M2 — SIGNIFICANT CHANGE UP
EOSINOPHIL # BLD AUTO: 0.4 K/UL — SIGNIFICANT CHANGE UP (ref 0–0.5)
EOSINOPHIL NFR BLD AUTO: 4.8 % — SIGNIFICANT CHANGE UP (ref 0–6)
GLUCOSE SERPL-MCNC: 90 MG/DL — SIGNIFICANT CHANGE UP (ref 70–99)
HCT VFR BLD CALC: 40.7 % — SIGNIFICANT CHANGE UP (ref 34.5–45)
HGB BLD-MCNC: 13.7 G/DL — SIGNIFICANT CHANGE UP (ref 11.5–15.5)
IMM GRANULOCYTES NFR BLD AUTO: 0.1 % — SIGNIFICANT CHANGE UP (ref 0–0.9)
LYMPHOCYTES # BLD AUTO: 1.78 K/UL — SIGNIFICANT CHANGE UP (ref 1–3.3)
LYMPHOCYTES # BLD AUTO: 21.4 % — SIGNIFICANT CHANGE UP (ref 13–44)
MCHC RBC-ENTMCNC: 31.3 PG — SIGNIFICANT CHANGE UP (ref 27–34)
MCHC RBC-ENTMCNC: 33.7 GM/DL — SIGNIFICANT CHANGE UP (ref 32–36)
MCV RBC AUTO: 92.9 FL — SIGNIFICANT CHANGE UP (ref 80–100)
MONOCYTES # BLD AUTO: 0.67 K/UL — SIGNIFICANT CHANGE UP (ref 0–0.9)
MONOCYTES NFR BLD AUTO: 8.1 % — SIGNIFICANT CHANGE UP (ref 2–14)
NEUTROPHILS # BLD AUTO: 5.4 K/UL — SIGNIFICANT CHANGE UP (ref 1.8–7.4)
NEUTROPHILS NFR BLD AUTO: 65 % — SIGNIFICANT CHANGE UP (ref 43–77)
NRBC # BLD: 0 /100 WBCS — SIGNIFICANT CHANGE UP (ref 0–0)
PLATELET # BLD AUTO: 342 K/UL — SIGNIFICANT CHANGE UP (ref 150–400)
POTASSIUM SERPL-MCNC: 4.5 MMOL/L — SIGNIFICANT CHANGE UP (ref 3.5–5.3)
POTASSIUM SERPL-SCNC: 4.5 MMOL/L — SIGNIFICANT CHANGE UP (ref 3.5–5.3)
PROT SERPL-MCNC: 7.1 G/DL — SIGNIFICANT CHANGE UP (ref 6–8.3)
RBC # BLD: 4.38 M/UL — SIGNIFICANT CHANGE UP (ref 3.8–5.2)
RBC # FLD: 12.3 % — SIGNIFICANT CHANGE UP (ref 10.3–14.5)
SODIUM SERPL-SCNC: 143 MMOL/L — SIGNIFICANT CHANGE UP (ref 135–145)
WBC # BLD: 8.31 K/UL — SIGNIFICANT CHANGE UP (ref 3.8–10.5)
WBC # FLD AUTO: 8.31 K/UL — SIGNIFICANT CHANGE UP (ref 3.8–10.5)

## 2024-09-09 PROCEDURE — 99497 ADVNCD CARE PLAN 30 MIN: CPT | Mod: GC,25

## 2024-09-09 PROCEDURE — 93010 ELECTROCARDIOGRAM REPORT: CPT

## 2024-09-09 PROCEDURE — 99223 1ST HOSP IP/OBS HIGH 75: CPT | Mod: GC

## 2024-09-09 PROCEDURE — 99285 EMERGENCY DEPT VISIT HI MDM: CPT

## 2024-09-09 RX ORDER — ONDANSETRON 2 MG/ML
4 INJECTION, SOLUTION INTRAMUSCULAR; INTRAVENOUS EVERY 8 HOURS
Refills: 0 | Status: DISCONTINUED | OUTPATIENT
Start: 2024-09-09 | End: 2024-09-11

## 2024-09-09 RX ORDER — TRAMADOL HYDROCHLORIDE 200 MG/1
50 TABLET, EXTENDED RELEASE ORAL EVERY 12 HOURS
Refills: 0 | Status: DISCONTINUED | OUTPATIENT
Start: 2024-09-09 | End: 2024-09-11

## 2024-09-09 RX ORDER — SODIUM CHLORIDE 9 MG/ML
1000 INJECTION INTRAMUSCULAR; INTRAVENOUS; SUBCUTANEOUS ONCE
Refills: 0 | Status: COMPLETED | OUTPATIENT
Start: 2024-09-09 | End: 2024-09-09

## 2024-09-09 RX ORDER — AMITRIPTYLINE HCL 25 MG
25 TABLET ORAL AT BEDTIME
Refills: 0 | Status: DISCONTINUED | OUTPATIENT
Start: 2024-09-09 | End: 2024-09-10

## 2024-09-09 RX ORDER — GABAPENTIN 100 MG
600 CAPSULE ORAL
Refills: 0 | Status: DISCONTINUED | OUTPATIENT
Start: 2024-09-09 | End: 2024-09-11

## 2024-09-09 RX ORDER — ACETAMINOPHEN 325 MG/1
650 TABLET ORAL EVERY 6 HOURS
Refills: 0 | Status: DISCONTINUED | OUTPATIENT
Start: 2024-09-09 | End: 2024-09-11

## 2024-09-09 RX ORDER — ENOXAPARIN SODIUM 100 MG/ML
40 INJECTION SUBCUTANEOUS ONCE
Refills: 0 | Status: DISCONTINUED | OUTPATIENT
Start: 2024-09-09 | End: 2024-09-09

## 2024-09-09 RX ORDER — MAGNESIUM, ALUMINUM HYDROXIDE 200-225/5
30 SUSPENSION, ORAL (FINAL DOSE FORM) ORAL EVERY 4 HOURS
Refills: 0 | Status: DISCONTINUED | OUTPATIENT
Start: 2024-09-09 | End: 2024-09-11

## 2024-09-09 RX ORDER — POLYETHYLENE GLYCOL 3350 17 G/17G
17 POWDER, FOR SOLUTION ORAL DAILY
Refills: 0 | Status: DISCONTINUED | OUTPATIENT
Start: 2024-09-09 | End: 2024-09-11

## 2024-09-09 RX ORDER — ONDANSETRON 2 MG/ML
4 INJECTION, SOLUTION INTRAMUSCULAR; INTRAVENOUS ONCE
Refills: 0 | Status: COMPLETED | OUTPATIENT
Start: 2024-09-09 | End: 2024-09-09

## 2024-09-09 RX ORDER — SENNA 187 MG
1 TABLET ORAL ONCE
Refills: 0 | Status: COMPLETED | OUTPATIENT
Start: 2024-09-09 | End: 2024-09-10

## 2024-09-09 RX ORDER — PRIMIDONE 50 MG/1
25 TABLET ORAL AT BEDTIME
Refills: 0 | Status: DISCONTINUED | OUTPATIENT
Start: 2024-09-09 | End: 2024-09-10

## 2024-09-09 RX ORDER — AMLODIPINE BESYLATE 10 MG/1
5 TABLET ORAL DAILY
Refills: 0 | Status: DISCONTINUED | OUTPATIENT
Start: 2024-09-09 | End: 2024-09-11

## 2024-09-09 RX ORDER — TIZANIDINE HYDROCHLORIDE 2 MG/1
2 CAPSULE ORAL
Refills: 0 | DISCHARGE

## 2024-09-09 RX ORDER — TRAMADOL HYDROCHLORIDE 200 MG/1
25 TABLET, EXTENDED RELEASE ORAL EVERY 6 HOURS
Refills: 0 | Status: DISCONTINUED | OUTPATIENT
Start: 2024-09-09 | End: 2024-09-11

## 2024-09-09 RX ORDER — DALFAMPRIDINE 10 MG/1
1 TABLET, FILM COATED, EXTENDED RELEASE ORAL
Refills: 0 | DISCHARGE

## 2024-09-09 RX ORDER — LIDOCAINE/BENZALKONIUM/ALCOHOL
1 SOLUTION, NON-ORAL TOPICAL DAILY
Refills: 0 | Status: DISCONTINUED | OUTPATIENT
Start: 2024-09-09 | End: 2024-09-11

## 2024-09-09 RX ORDER — PRIMIDONE 50 MG
0.5 TABLET ORAL
Refills: 0 | DISCHARGE

## 2024-09-09 RX ORDER — ENOXAPARIN SODIUM 100 MG/ML
30 INJECTION SUBCUTANEOUS EVERY 24 HOURS
Refills: 0 | Status: DISCONTINUED | OUTPATIENT
Start: 2024-09-09 | End: 2024-09-11

## 2024-09-09 RX ORDER — BACLOFEN 0.5 MG/ML
1 INJECTION INTRATHECAL
Refills: 0 | DISCHARGE

## 2024-09-09 RX ORDER — BACLOFEN 0.5 MG/ML
20 INJECTION INTRATHECAL EVERY 8 HOURS
Refills: 0 | Status: DISCONTINUED | OUTPATIENT
Start: 2024-09-09 | End: 2024-09-11

## 2024-09-09 RX ADMIN — SODIUM CHLORIDE 1000 MILLILITER(S): 9 INJECTION INTRAMUSCULAR; INTRAVENOUS; SUBCUTANEOUS at 13:31

## 2024-09-09 RX ADMIN — AMLODIPINE BESYLATE 5 MILLIGRAM(S): 10 TABLET ORAL at 22:41

## 2024-09-09 RX ADMIN — Medication 4 MILLIGRAM(S): at 13:30

## 2024-09-09 RX ADMIN — ONDANSETRON 4 MILLIGRAM(S): 2 INJECTION, SOLUTION INTRAMUSCULAR; INTRAVENOUS at 13:30

## 2024-09-09 RX ADMIN — Medication 1 PATCH: at 22:45

## 2024-09-09 RX ADMIN — Medication 600 MILLIGRAM(S): at 19:12

## 2024-09-09 RX ADMIN — Medication 25 MILLIGRAM(S): at 22:41

## 2024-09-09 RX ADMIN — ENOXAPARIN SODIUM 30 MILLIGRAM(S): 100 INJECTION SUBCUTANEOUS at 22:40

## 2024-09-09 RX ADMIN — Medication 3 MILLIGRAM(S): at 23:03

## 2024-09-09 RX ADMIN — PRIMIDONE 25 MILLIGRAM(S): 50 TABLET ORAL at 22:41

## 2024-09-09 NOTE — H&P ADULT - ATTENDING COMMENTS
[employer] 49 year old F with a PMHx of hypertension, depression,  progressive MS (dx 2022, on Ocrevus since 2020), and lumbar radiculopathy presents to the ED d/t progressive extremity weakness and urinary incontinence since her discharge from  ED in June 2024.    Gait instability  Lumbar radiculopathy  - secondary to recent injury resulting in L3/L4 disc bulge with central canal stenosis,  L4/L4 disc bulge, and  L5 impinged nerve  - progressively worsening bilateral extremity weakness  - no signs of spinal cord compression including fecal/urinary retention/incontinence of saddle paresthesia  - PT/OT consulted. pt wants acute rehab  - pain management with Tylenol/tramadol PRN, bowel regimen on opioids    Urinary Urgency  - denies fever, dysuria, frequency  - will obtain UA to assess for UTI    Chronic MS  - c/w home meds 49 year old F with a PMHx of hypertension, depression,  progressive MS (dx 2022, on Ocrevus since 2020), and lumbar radiculopathy presents to the ED d/t progressive extremity weakness and urinary incontinence since her discharge from  ED in June 2024.    Gait instability  Lumbar radiculopathy  - secondary to recent injury resulting in L3/L4 disc bulge with central canal stenosis,  L4/L4 disc bulge, and  L5 impinged nerve  - progressively worsening bilateral extremity weakness  - no signs of spinal cord compression including fecal/urinary retention/incontinence of saddle paresthesia  - PT/OT consulted. pt wants acute rehab  - pain management with Tylenol/tramadol PRN, bowel regimen on opioids. Lidocaine patch ordered    Urinary Urgency  - denies fever, dysuria, frequency  - will obtain UA to assess for UTI    Chronic MS  - c/w home meds

## 2024-09-09 NOTE — ED ADULT NURSE NOTE - NSFALLRISKINTERV_ED_ALL_ED

## 2024-09-09 NOTE — H&P ADULT - ASSESSMENT
#Ambulatory dysfunction  49 year old F with a PMHx of hypertension, depression,  progressive MS (dx 2022, on Ocrevus since 2020), and lumbar radiculopathy presents to the ED d/t progressive extremity weakness and urinary incontinence since her discharge from  ED in June 2020    #Difficulty Ambulating   - neuro vs ortho   - MRI  -PT/OT consult   - Neuro consult    #Multiple Sclerosis       #Depression  - cx amtryptiline     #Hypertension  - cw amlodipine     #GOC  - full code  49 year old F with a PMHx of hypertension, depression,  progressive MS (dx 2022, on Ocrevus since 2020), and lumbar radiculopathy presents to the ED d/t progressive extremity weakness and urinary incontinence since her discharge from  ED in June 2024    #Ambulatory Dysfunction  worsening bilateral extremity weakness, urinary incontinence for 2 months  - neuro vs ortho   - f/u PT/OT consult   - Neuro consult  - Tramadol  25 mg PRN q6   - Tramadol 50 mg PRN q 12    #Hypertension  - cw amlodipine 5 mg    #Essential tremor  - cw primidone 50 mg BID    #Lumbar radiculopathy   - cw baclofen 20 mg   - cw gabapentin 600 mg  - cw tizanidine 4 mg  - cw amitriptyline 25 mg     #Multiple Sclerosis   - cw dalfampridine 10 mg     #Depression  - cw amitriptyline     #Diet  - dash diet     #DVT prophylaxis  - lovenox 40 mg    #GOC  - full code    49 year old F with a PMHx of hypertension, depression,  progressive MS (dx 2022, on Ocrevus since 2020), and lumbar radiculopathy presents to the ED d/t progressive extremity weakness and urinary incontinence since her discharge from  ED in June 2024.     Admitted for:    #Ambulatory Dysfunction   #Lumbar radiculopathy  - secondary to recent injury resulting in L3/L4 disc bulge with central canal stenosis,  L4/L4 disc bulge, and  L5 impinged nerve  - progressively worsening bilateral extremity weakness  - no signs of spinal cord compression including fecal/urinary retention/incontinence of saddle paresthesia  - PT/OT consult   - cw home baclofen 20 mg (do not have tizanidine here)  - cw home gabapentin 600 mg  - cw home amitriptyline 25 mg   - Tylenol for mild pain  - Tramadol  25 mg PRN q6 for moderate pain  - Tramadol 50 mg PRN q12 for severe pain    #Urinary Urgency  - denies fever, dysuria, frequency  - will obtain UA to assess for UTI    #Hypertension  - cw amlodipine 5 mg    #Essential tremor  - cw primidone 50 mg BID    #Chronic Multiple Sclerosis - not a flare  - cw dalfampridine 10 mg     #Depression  - cw amitriptyline     #Diet  - dash diet     #DVT prophylaxis  - lovenox 40 mg    #GOC  - full code     No need for AM labs at this time     updated at bedside    d/w Dr. Alcala   49 year old F with a PMHx of hypertension, depression,  progressive MS (dx 2022, on Ocrevus since 2020), and lumbar radiculopathy presents to the ED d/t progressive extremity weakness and urinary incontinence since her discharge from  ED in June 2024.     Admitted for:    #Ambulatory Dysfunction; Gait instability  #Lumbar radiculopathy  - secondary to recent injury resulting in L3/L4 disc bulge with central canal stenosis,  L4/L4 disc bulge, and  L5 impinged nerve  - progressively worsening bilateral extremity weakness  - no signs of spinal cord compression including fecal/urinary retention/incontinence of saddle paresthesia  - PT/OT consult   - cw home baclofen 20 mg (do not have tizanidine here)  - cw home gabapentin 600 mg  - cw home amitriptyline 25 mg   - Tylenol for mild pain  - Tramadol  25 mg PRN q6 for moderate pain  - Tramadol 50 mg PRN q12 for severe pain    #Urinary Urgency  - denies fever, dysuria, frequency  - will obtain UA to assess for UTI    #Hypertension  - cw amlodipine 5 mg    #Essential tremor  - cw primidone 50 mg BID    #Chronic Multiple Sclerosis - not a flare  - cw dalfampridine 10 mg     #Depression  - cw amitriptyline     #Diet  - dash diet     #DVT prophylaxis  - lovenox 40 mg    #Advanced care planning  - full code     No need for AM labs at this time     updated at bedside    d/w Dr. Alcala   49 year old F with a PMHx of hypertension, depression,  progressive MS (dx 2022, on Ocrevus since 2020), and lumbar radiculopathy presents to the ED d/t progressive extremity weakness and urinary incontinence since her discharge from  ED in June 2024.     Admitted for:    #Ambulatory Dysfunction; Gait instability  #Lumbar radiculopathy  - secondary to recent injury resulting in L3/L4 disc bulge with central canal stenosis,  L4/L4 disc bulge, and  L5 impinged nerve  - progressively worsening bilateral extremity weakness  - no signs of spinal cord compression including fecal/urinary retention/incontinence of saddle paresthesia  - s/p Morphine 4mg IV x1 in ER  - PT/OT consult   - cw home baclofen 20 mg (do not have tizanidine here)  - cw home gabapentin 600 mg  - cw home amitriptyline 25 mg   - Tylenol for mild pain  - Tramadol  25 mg PRN q6 for moderate pain  - Tramadol 50 mg PRN q12 for severe pain    #Urinary Urgency  - denies fever, dysuria, frequency  - will obtain UA to assess for UTI    #Hypertension  - cw amlodipine 5 mg    #Essential tremor  - cw primidone 50 mg BID    #Chronic Multiple Sclerosis - not a flare  - cw dalfampridine 10 mg     #Depression  - cw amitriptyline     #Diet  - dash diet     #DVT prophylaxis  - lovenox 40 mg    #Advanced care planning  - full code     No need for AM labs at this time     updated at bedside    d/w Dr. Alcala
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
## 2024-09-09 NOTE — H&P ADULT - NSICDXPASTMEDICALHX_GEN_ALL_CORE_FT
PAST MEDICAL HISTORY:  Back spasm     Hypertension     Lumbar radiculopathy     Multiple sclerosis, primary progressive     Neuropathy     No pertinent past medical history

## 2024-09-09 NOTE — PATIENT PROFILE ADULT - FUNCTIONAL ASSESSMENT - BASIC MOBILITY 6.
2-calculated by average/Not able to assess (calculate score using [facility] averaging method)
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
## 2024-09-09 NOTE — H&P ADULT - NSHPPHYSICALEXAM_GEN_ALL_CORE
Vital Signs Last 24 Hrs  T(C): 37.2 (09 Sep 2024 12:43), Max: 37.2 (09 Sep 2024 12:43)  T(F): 98.9 (09 Sep 2024 12:43), Max: 98.9 (09 Sep 2024 12:43)  HR: 98 (09 Sep 2024 12:43) (98 - 98)  BP: 133/85 (09 Sep 2024 12:43) (133/85 - 133/85)  BP(mean): --  RR: 18 (09 Sep 2024 12:43) (18 - 18)  SpO2: 99% (09 Sep 2024 12:43) (99% - 99%)    Parameters below as of 09 Sep 2024 12:43  Patient On (Oxygen Delivery Method): room air    PHYSICAL EXAM:    GENERAL: NAD, lying in bed comfortably  HEAD:  Atraumatic, Normocephalic  EYES: EOMI, PERRLA, conjunctiva and sclera clear  ENT: Moist mucous membranes  NECK: Supple, No JVD  CHEST/LUNG: Clear to auscultation bilaterally, good air entry bilaterally; No wheezing, rales, or rhonchi. Unlabored respirations  HEART: Regular rate and rhythm. S1 and S2. No murmurs, rubs, or gallops  ABDOMEN: Soft, Nontender, Nondistended. Bowel sounds present x4 quadrants; No hepatomegaly. No splenomegaly.  EXTREMITIES:  2+ Peripheral Pulses. Capillary refill <2 seconds. No clubbing, cyanosis, or edema  NERVOUS SYSTEM:  Alert & Oriented X3, speech clear. No deficits   MSK: FROM all 4 extremities, full and equal strength  SKIN: No rashes, bruises, or other lesions Vital Signs Last 24 Hrs  T(C): 37.2 (09 Sep 2024 12:43), Max: 37.2 (09 Sep 2024 12:43)  T(F): 98.9 (09 Sep 2024 12:43), Max: 98.9 (09 Sep 2024 12:43)  HR: 98 (09 Sep 2024 12:43) (98 - 98)  BP: 133/85 (09 Sep 2024 12:43) (133/85 - 133/85)  BP(mean): --  RR: 18 (09 Sep 2024 12:43) (18 - 18)  SpO2: 99% (09 Sep 2024 12:43) (99% - 99%)    Parameters below as of 09 Sep 2024 12:43  Patient On (Oxygen Delivery Method): room air    PHYSICAL EXAM:    GENERAL: NAD, lying in bed comfortably  HEAD:  Atraumatic, Normocephalic  EYES: EOMI, PERRLA, conjunctiva and sclera clear  ENT: Moist mucous membranes  NECK: Supple, No JVD  CHEST/LUNG: Clear to auscultation bilaterally, good air entry bilaterally; No wheezing, rales, or rhonchi. Unlabored respirations  HEART: Regular rate and rhythm. S1 and S2. No murmurs, rubs, or gallops  ABDOMEN: Soft, Nontender, Nondistended. Bowel sounds present x4 quadrants; No hepatomegaly. No splenomegaly.  EXTREMITIES:  2+ Peripheral Pulses. Capillary refill <2 seconds. No clubbing, cyanosis, or edema  NERVOUS SYSTEM:  Alert & Oriented X3, speech clear. No deficits   MSK: bilateral extremity weakness, L leg 2/5   SKIN: No rashes, bruises, or other lesions Vital Signs Last 24 Hrs  T(C): 37.2 (09 Sep 2024 12:43), Max: 37.2 (09 Sep 2024 12:43)  T(F): 98.9 (09 Sep 2024 12:43), Max: 98.9 (09 Sep 2024 12:43)  HR: 98 (09 Sep 2024 12:43) (98 - 98)  BP: 133/85 (09 Sep 2024 12:43) (133/85 - 133/85)  BP(mean): --  RR: 18 (09 Sep 2024 12:43) (18 - 18)  SpO2: 99% (09 Sep 2024 12:43) (99% - 99%)    Parameters below as of 09 Sep 2024 12:43  Patient On (Oxygen Delivery Method): room air    PHYSICAL EXAM:    GENERAL: NAD, lying in bed comfortably  HEAD:  Atraumatic, Normocephalic  EYES: EOMI, PERRLA, conjunctiva and sclera clear  ENT: Moist mucous membranes  NECK: Supple  CHEST/LUNG: Clear to auscultation bilaterally, good air entry bilaterally; No wheezing, rales, or rhonchi. Unlabored respirations  HEART: Regular rate and rhythm. S1 and S2. No murmurs, rubs, or gallops  ABDOMEN: Soft, Nontender, Nondistended. Bowel sounds present   EXTREMITIES:  2+ Peripheral Pulses. Capillary refill <2 seconds. No clubbing, cyanosis, or edema  NERVOUS SYSTEM:  Alert & Oriented X3, speech clear. No deficits   MSK: bilateral extremity weakness, L leg strength 2/5, right 5/5  SKIN: No rashes, bruises, or other lesions
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
## 2024-09-09 NOTE — H&P ADULT - REASON FOR ADMISSION
difficulty ambulating, weakness, urinary incontinence difficulty ambulating, weakness, urinary urgency

## 2024-09-09 NOTE — ED ADULT NURSE NOTE - NSFALLRISKASMTTYPE_ED_ALL_ED
RNCC Care Coordination Note    Encounter Summary:  RN supportive call.  F/U after neurology started a new medication: Namenda. Per wife, patient has started the medication as prescribed. She reports no concerns at this time.  I will f/u in one week.  Bridger     RNCC Assessments    See Care Coordination Smartform for complete baseline and current assessment.       Current Signs and Symptoms Assessed this encounter: Yes   [x] No new or worsening symptoms present  Symptoms present:    Cardiac/Pulmonary   [] Chest Pain    [] Cough   [] Increased Sputum   [] Orthopnea   [] Palpitations   [] Shortness of breath with activity   [] Shortness of breath at rest   [] Swelling    [] Wheezing  Activity   [] ADL impairment   [] Dizziness   [] Increased Fatigue   [] Lightheadedness    [] New Fear of Falling or Recent Fall    [] Weakness  Mood    [] Angry   [] Anxious   [] Crying   [] Depressed   [] Withdrawn  General   [] Acute Weight Concern   [] Appetite Concern   [] Chills    [] Confusion   [] Constipation   [] Diaphoresis    [] Diarrhea   [] Increased Thirst   [] Inadequate support    [] Lethargy   [] Nausea   [] Numbness   [] Sleep Concern   []Tingling    [] Urinary Concern   [] Visual Changes   [] Vomiting    [] Wound   [] Other  Acute Pain Assessment    Acute Pain:  No       Chronic Pain Assessment    Chronic Pain:  No       Labs/Diagnostics Reviewed: Not applicable    Symptom Trends:    COPD Symptom Course   Stable    Medication:  New RX since Last Contact: Yes - Based on RX info   New Medication Concerns (See Patient Level Medication section for baseline adherence assessment): No    Goals Addressed                 This Visit's Progress       Patient Stated    • Not to have any falls. (pt-stated)   On track    • To not be constipated (pt-stated)   On track     1/2: recommended miralax daily.          RNCC Interventions                Patient Education:  Yes   Recipients of Education:  Family   Education Methods used this  encounter:  Verbal   Action Plan:  Yes   Daily Maintenance, Sick Day Plan, Emergency Plan   Wellness/Lifestyle, Medication Education, Disease Process, Decision Making support   Reinforce Care Plan        Acuity Assessment     Acuity Assessment:   Date Acuity Level   1/7/19 3              Initial (On Arrival)

## 2024-09-09 NOTE — H&P ADULT - NSHPREVIEWOFSYSTEMS_GEN_ALL_CORE
Constitutional: No fevers, chills, or sweats.  Cardiac: No chest pain, exertional dyspnea, orthopnea  Respiratory: No shortness of breath, no cough  GI: No abdominal pain, no N/V/D  : urinary incontinence, no dysuria or hematuria   MSK: weakness in extremities, no numbness tingling   Neuro: No headaches, no neck pain/stiffness, no numbness  All other systems reviewed and are negative unless otherwise stated in the HPI. Constitutional: No fevers, chills, or sweats.  Cardiac: No chest pain, exertional dyspnea, orthopnea  Respiratory: No shortness of breath, no cough  GI: No abdominal pain, no N/V/D  : +urinary urgency, no dysuria or hematuria   MSK: + weakness in all extremities (L>R), no numbness no tingling   Neuro: No headaches, no neck pain/stiffness, no numbness  All other systems reviewed and are negative unless otherwise stated in the HPI.

## 2024-09-09 NOTE — PATIENT PROFILE ADULT - FALL HARM RISK - HARM RISK INTERVENTIONS

## 2024-09-09 NOTE — ED PROVIDER NOTE - PHYSICAL EXAMINATION
General:     NAD, well-nourished, well-appearing  Head:     NC/AT, EOMI, oral mucosa moist  Neck:     trachea midline  Lungs:     CTA b/l, no w/r/r  CVS:     S1S2, RRR, no m/g/r  Abd:     +BS, s/nt/nd, no organomegaly  Ext:    2+ radial and pedal pulses, no c/c/e  Neuro: AAOx3, Weakness of the left leg 3 out of 10 Able to stand unable to ambulate

## 2024-09-10 ENCOUNTER — TRANSCRIPTION ENCOUNTER (OUTPATIENT)
Age: 49
End: 2024-09-10

## 2024-09-10 LAB
APPEARANCE UR: CLEAR — SIGNIFICANT CHANGE UP
BACTERIA # UR AUTO: NEGATIVE /HPF — SIGNIFICANT CHANGE UP
BILIRUB UR-MCNC: NEGATIVE — SIGNIFICANT CHANGE UP
COLOR SPEC: YELLOW — SIGNIFICANT CHANGE UP
DIFF PNL FLD: NEGATIVE — SIGNIFICANT CHANGE UP
EPI CELLS # UR: 0 — SIGNIFICANT CHANGE UP
GLUCOSE UR QL: NEGATIVE MG/DL — SIGNIFICANT CHANGE UP
KETONES UR-MCNC: NEGATIVE MG/DL — SIGNIFICANT CHANGE UP
LEUKOCYTE ESTERASE UR-ACNC: NEGATIVE — SIGNIFICANT CHANGE UP
NITRITE UR-MCNC: NEGATIVE — SIGNIFICANT CHANGE UP
PH UR: 7 — SIGNIFICANT CHANGE UP (ref 5–8)
PROT UR-MCNC: NEGATIVE MG/DL — SIGNIFICANT CHANGE UP
RBC CASTS # UR COMP ASSIST: 0 /HPF — SIGNIFICANT CHANGE UP (ref 0–4)
SP GR SPEC: 1.01 — SIGNIFICANT CHANGE UP (ref 1–1.03)
UROBILINOGEN FLD QL: 0.2 MG/DL — SIGNIFICANT CHANGE UP (ref 0.2–1)
WBC UR QL: 0 /HPF — SIGNIFICANT CHANGE UP (ref 0–5)

## 2024-09-10 PROCEDURE — 99232 SBSQ HOSP IP/OBS MODERATE 35: CPT

## 2024-09-10 PROCEDURE — 99222 1ST HOSP IP/OBS MODERATE 55: CPT

## 2024-09-10 PROCEDURE — 99222 1ST HOSP IP/OBS MODERATE 55: CPT | Mod: GC

## 2024-09-10 RX ORDER — AMITRIPTYLINE HCL 25 MG
25 TABLET ORAL
Refills: 0 | Status: DISCONTINUED | OUTPATIENT
Start: 2024-09-10 | End: 2024-09-11

## 2024-09-10 RX ORDER — PRIMIDONE 50 MG/1
50 TABLET ORAL
Refills: 0 | Status: DISCONTINUED | OUTPATIENT
Start: 2024-09-10 | End: 2024-09-11

## 2024-09-10 RX ORDER — DEXTROAMPHETAMINE SACCHARATE, AMPHETAMINE ASPARTATE MONOHYDRATE, DEXTROAMPHETAMINE SULFATE, AMPHETAMINE SULFATE 6.25; 6.25; 6.25; 6.25 MG/1; MG/1; MG/1; MG/1
10 CAPSULE, EXTENDED RELEASE ORAL
Refills: 0 | Status: DISCONTINUED | OUTPATIENT
Start: 2024-09-10 | End: 2024-09-11

## 2024-09-10 RX ADMIN — Medication 1 PATCH: at 22:46

## 2024-09-10 RX ADMIN — Medication 1 PATCH: at 06:09

## 2024-09-10 RX ADMIN — TRAMADOL HYDROCHLORIDE 50 MILLIGRAM(S): 200 TABLET, EXTENDED RELEASE ORAL at 15:38

## 2024-09-10 RX ADMIN — ACETAMINOPHEN 650 MILLIGRAM(S): 325 TABLET ORAL at 20:42

## 2024-09-10 RX ADMIN — AMLODIPINE BESYLATE 5 MILLIGRAM(S): 10 TABLET ORAL at 06:38

## 2024-09-10 RX ADMIN — TRAMADOL HYDROCHLORIDE 25 MILLIGRAM(S): 200 TABLET, EXTENDED RELEASE ORAL at 18:50

## 2024-09-10 RX ADMIN — ACETAMINOPHEN 650 MILLIGRAM(S): 325 TABLET ORAL at 21:10

## 2024-09-10 RX ADMIN — Medication 25 MILLIGRAM(S): at 12:54

## 2024-09-10 RX ADMIN — BACLOFEN 20 MILLIGRAM(S): 0.5 INJECTION INTRATHECAL at 17:16

## 2024-09-10 RX ADMIN — PRIMIDONE 50 MILLIGRAM(S): 50 TABLET ORAL at 17:17

## 2024-09-10 RX ADMIN — Medication 1 PATCH: at 10:59

## 2024-09-10 RX ADMIN — Medication 1 PATCH: at 17:16

## 2024-09-10 RX ADMIN — TRAMADOL HYDROCHLORIDE 50 MILLIGRAM(S): 200 TABLET, EXTENDED RELEASE ORAL at 12:55

## 2024-09-10 RX ADMIN — Medication 3 MILLIGRAM(S): at 22:24

## 2024-09-10 RX ADMIN — BACLOFEN 20 MILLIGRAM(S): 0.5 INJECTION INTRATHECAL at 06:36

## 2024-09-10 RX ADMIN — Medication 1 TABLET(S): at 22:23

## 2024-09-10 RX ADMIN — ENOXAPARIN SODIUM 30 MILLIGRAM(S): 100 INJECTION SUBCUTANEOUS at 22:23

## 2024-09-10 RX ADMIN — TRAMADOL HYDROCHLORIDE 25 MILLIGRAM(S): 200 TABLET, EXTENDED RELEASE ORAL at 17:15

## 2024-09-10 RX ADMIN — Medication 600 MILLIGRAM(S): at 06:36

## 2024-09-10 RX ADMIN — Medication 600 MILLIGRAM(S): at 17:15

## 2024-09-10 RX ADMIN — POLYETHYLENE GLYCOL 3350 17 GRAM(S): 17 POWDER, FOR SOLUTION ORAL at 12:54
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
## 2024-09-10 NOTE — OCCUPATIONAL THERAPY INITIAL EVALUATION ADULT - LEVEL OF INDEPENDENCE: GROOMING, OT EVAL
pt verbalizing increased difficulty brushing and tying hair/moderate assist (50% patients effort)/maximum assist (25% patients effort)

## 2024-09-10 NOTE — OCCUPATIONAL THERAPY INITIAL EVALUATION ADULT - LEVEL OF INDEPENDENCE: EATING, OT EVAL
pt with increased difficulty cutting foods, using utensils properly, and self-feeding/minimum assist (75% patients effort)

## 2024-09-10 NOTE — DISCHARGE NOTE PROVIDER - CARE PROVIDER_API CALL
Lynda Wu  Family Medicine  [address] [place] [address], [facility] [address]  [place] [address]  Phone: [phone]  Fax: [phone]  Follow Up Time:

## 2024-09-10 NOTE — OCCUPATIONAL THERAPY INITIAL EVALUATION ADULT - IMPAIRED TRANSFERS: BED/CHAIR, REHAB EVAL
ataxic/impaired balance/impaired coordination/impaired motor control/pain/impaired postural control/decreased ROM/scissoring/decreased strength

## 2024-09-10 NOTE — DISCHARGE NOTE PROVIDER - NSDCMRMEDTOKEN_GEN_ALL_CORE_FT
amitriptyline 25 mg oral tablet: 1 tab(s) orally once a day  amLODIPine 5 mg oral tablet: 1 tab(s) orally once a day  baclofen 20 mg oral tablet: 1 tab(s) orally 3 times a day  dalfampridine 10 mg oral tablet, extended release: 1 tab(s) orally 2 times a day  gabapentin 600 mg oral tablet: 1 tab(s) orally 2 times a day  ocrevus:   primidone 50 mg oral tablet: 1 tab(s) orally 2 times a day  rollator: rollator  tiZANidine 4 mg oral tablet: 2 tab(s) orally 2 times a day as needed for back pain for breakthrough back muscle spasm as needed.   acetaminophen 325 mg oral tablet: 2 tab(s) orally every 6 hours As needed Temp greater or equal to 38C (100.4F), Mild Pain (1 - 3)  amitriptyline 25 mg oral tablet: 1 tab(s) orally once a day  amLODIPine 5 mg oral tablet: 1 tab(s) orally once a day  baclofen 20 mg oral tablet: 1 tab(s) orally every 8 hours As needed Muscle Spasm  dalfampridine 10 mg oral tablet, extended release: 1 tab(s) orally 2 times a day  dextroamphetamine-amphetamine 10 mg oral tablet: 1 tab(s) orally once a day  gabapentin 300 mg oral capsule: 2 cap(s) orally 2 times a day  lidocaine 4% topical film: Apply topically to affected area once a day  ocrevus:   primidone 50 mg oral tablet: 1 tab(s) orally 2 times a day  rollator: rollator  traMADol 50 mg oral tablet: 0.5 tab(s) orally every 6 hours As needed Moderate Pain (4 - 6)  traMADol 50 mg oral tablet: 1 tab(s) orally every 12 hours As needed Severe Pain (7 - 10)

## 2024-09-10 NOTE — PROGRESS NOTE ADULT - ASSESSMENT
49 year old F with a PMHx of hypertension, depression,  progressive MS (dx 2022, on Ocrevus since 2020), and lumbar radiculopathy presents to the ED d/t progressive extremity weakness and urinary incontinence since her discharge from  ED in June 2024.     #Ambulatory Dysfunction; Gait instability  #Lumbar radiculopathy  - secondary to recent injury resulting in L3/L4 disc bulge with central canal stenosis,  L4/L4 disc bulge, and  L5 impinged nerve  - progressively worsening bilateral extremity weakness  - no signs of spinal cord compression including fecal/urinary retention/incontinence or saddle paresthesia  - s/p Morphine 4mg IV x1 in ER  - PT/OT/PMR consult placed  - cw home baclofen 20 mg (do not have tizanidine here)  - cw home gabapentin 600 mg  - cw home amitriptyline 25 mg   - cw home Adderall  - cw home Primadone   - medications confirmed with patient's pharmacy [facility] [address]  - Tylenol for mild pain  - Tramadol  25 mg PRN q6 for moderate pain  - Tramadol 50 mg PRN q12 for severe pain    #Urinary Urgency  - denies fever, dysuria  - UA appears negative   - F/U urine culture     #Hypertension  - cw amlodipine 5 mg    #Essential tremor  - cw primidone 50 mg BID    #Chronic Multiple Sclerosis - not a flare  - cw dalfampridine 10 mg (patient's family will bring in)    #Depression  - cw amitriptyline     #Diet  - dash diet     #DVT prophylaxis  - lovenox 40 mg    #Advanced care planning  - full code        updated at bedside     49 year old F with a PMHx of hypertension, depression,  progressive MS (dx 2022, on Ocrevus since 2020), and lumbar radiculopathy presents to the ED d/t progressive extremity weakness and urinary incontinence since her discharge from  ED in June 2024.     #Ambulatory Dysfunction; Gait instability  #Lumbar radiculopathy  - secondary to recent injury resulting in L3/L4 disc bulge with central canal stenosis,  L4/L4 disc bulge, and  L5 impinged nerve  - progressively worsening bilateral extremity weakness  - no signs of spinal cord compression including fecal/urinary retention/incontinence or saddle paresthesia  - s/p Morphine 4mg IV x1 in ER  - PT/OT/PMR consult placed  - cw home baclofen 20 mg (do not have tizanidine here)  - cw home gabapentin 600 mg  - cw home amitriptyline 25 mg   - cw home Adderall  - cw home Primadone   - medications confirmed with patient's pharmacy [facility]  - Tylenol for mild pain  - Tramadol  25 mg PRN q6 for moderate pain  - Tramadol 50 mg PRN q12 for severe pain  - Neurology consulted    #Urinary Urgency  - denies fever, dysuria  - UA appears negative   - F/U urine culture     #Hypertension  - cw amlodipine 5 mg    #Essential tremor  - cw primidone 50 mg BID    #Chronic Multiple Sclerosis - not a flare  - cw dalfampridine 10 mg (patient's family will bring in)    #Depression  - cw amitriptyline     #Diet  - dash diet     #DVT prophylaxis  - lovenox 40 mg    #Advanced care planning  - full code      updated at bedside 9/10

## 2024-09-10 NOTE — CONSULT NOTE ADULT - SUBJECTIVE AND OBJECTIVE BOX
Patient is a 49 year old woman with Multiple Sclerosis that states for the past few weeks difficulty walking with rolling walker. She states diagnosed in 2020 with Multiple Sclerosis. she states followed initially by Dr. Mitchell and then Dr. Tamara Tomlin. She states she has had left-sided weakness which has not changed. She also notes numbness and tingling of the left and right hand which has not changed. She has been treated twice yearly with infusions of ocrevus. She states yearly MRI studies. She recently had MRI Head obtained a few weeks ago and told stable. She denies HA or other neurological complaints. She states for the past few weeks she has noted left and right low back pain and right lateral thigh pain. She denies left radiating leg pain, neck pain, or radiating arm pain.     PMH: As above           HTN           [facility] 6/2024- [facility] hospitalization    SH: No allergy    Exam: Awake, alert, appropriate           Pupils 2.5mm, EOM intact, no nystagmus, VFF          CN II-XII intact          Motor tone and strength  RUE   5/5   LUE proximal 5/5, distal 3-/5                                                 RLE   5/5   LLE proximal 5/5   distal 0/5                                   13.7   8.31  )-----------( 342      ( 09 Sep 2024 13:20 )             40.7     09-09    143  |  105  |  15  ----------------------------<  90  4.5   |  32<H>  |  0.70    Ca    9.5      09 Sep 2024 13:20    TPro  7.1  /  Alb  4.0  /  TBili  0.3  /  DBili  x   /  AST  13  /  ALT  19  /  AlkPhos  115  09-09    Thyroid Stimulating Hormone, Serum: 0.67 uIU/mL (06.15.24 @ 06:15)  Vitamin B12, Serum: 813 pg/mL (06.15.24 @ 06:15)    Folate, Serum: 15.5 ng/mL (06.15.24 @ 06:15)
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
## 2024-09-10 NOTE — OCCUPATIONAL THERAPY INITIAL EVALUATION ADULT - GENERAL OBSERVATIONS, REHAB EVAL
Pt received seated edge of bed,  assisting with ADLs; verbalizing 8/10 pain in bilateral upper LEs and lower back; agreeable to OT IE; pt left as received, all lines intact, NAD, call bell in reach.

## 2024-09-10 NOTE — DISCHARGE NOTE PROVIDER - ATTENDING DISCHARGE PHYSICAL EXAMINATION:
GENERAL: NAD  CHEST/LUNG: Clear to percussion bilaterally; No rales, rhonchi, wheezing, or rubs; normal respiratory effort, no intercostal retractions; No pitting edema  HEART: Regular rate and rhythm; No murmurs, rubs, or gallops  PSYCH: Appropriate affect, Alert & Oriented x 3

## 2024-09-10 NOTE — DISCHARGE NOTE PROVIDER - CARE PROVIDERS DIRECT ADDRESSES
[email or web address]
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
## 2024-09-10 NOTE — OCCUPATIONAL THERAPY INITIAL EVALUATION ADULT - TRANSFER SAFETY CONCERNS NOTED: BED/CHAIR, REHAB EVAL
decreased balance during turns/losing balance/decreased sequencing ability/stand pivot/decreased weight-shifting ability

## 2024-09-10 NOTE — DISCHARGE NOTE PROVIDER - NSDCFUSCHEDAPPT_GEN_ALL_CORE_FT
Lucas Hope  [facility] [address] [facility]  Scheduled Appointment: 09/23/2024    Tamara Tomlin  [facility]  NEUROLOGY [address] [facility]  Scheduled Appointment: 09/24/2024    Benoit Anna  [facility] [address] [facility]  Scheduled Appointment: 09/26/2024
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
## 2024-09-10 NOTE — OCCUPATIONAL THERAPY INITIAL EVALUATION ADULT - IMPAIRED TRANSFERS: TOILET, REHAB EVAL
ataxic/impaired balance/impaired coordination/decreased flexibility/impaired motor control/abnormal muscle tone/pain/impaired postural control/scissoring/decreased strength

## 2024-09-10 NOTE — DISCHARGE NOTE PROVIDER - NSDCCPCAREPLAN_GEN_ALL_CORE_FT
PRINCIPAL DISCHARGE DIAGNOSIS  Diagnosis: Ambulatory dysfunction  Assessment and Plan of Treatment: Please participate in all recommended rehab therapies. Follow up with medical team on acute rehab unit.
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
## 2024-09-11 ENCOUNTER — TRANSCRIPTION ENCOUNTER (OUTPATIENT)
Age: 49
End: 2024-09-11

## 2024-09-11 ENCOUNTER — INPATIENT (INPATIENT)
Facility: HOSPITAL | Age: 49
LOS: 12 days | Discharge: HOME HEALTH | DRG: 59 | End: 2024-09-24
Attending: PHYSICAL MEDICINE & REHABILITATION | Admitting: PHYSICAL MEDICINE & REHABILITATION
Payer: COMMERCIAL

## 2024-09-11 VITALS — HEART RATE: 75 BPM | SYSTOLIC BLOOD PRESSURE: 135 MMHG | DIASTOLIC BLOOD PRESSURE: 86 MMHG

## 2024-09-11 VITALS
WEIGHT: 105.16 LBS | HEART RATE: 96 BPM | TEMPERATURE: 98 F | SYSTOLIC BLOOD PRESSURE: 127 MMHG | DIASTOLIC BLOOD PRESSURE: 79 MMHG | HEIGHT: 61 IN | RESPIRATION RATE: 16 BRPM | OXYGEN SATURATION: 98 %

## 2024-09-11 DIAGNOSIS — G35 MULTIPLE SCLEROSIS: ICD-10-CM

## 2024-09-11 DIAGNOSIS — Z87.59 PERSONAL HISTORY OF OTHER COMPLICATIONS OF PREGNANCY, CHILDBIRTH AND THE PUERPERIUM: Chronic | ICD-10-CM

## 2024-09-11 LAB
CULTURE RESULTS: SIGNIFICANT CHANGE UP
SARS-COV-2 RNA SPEC QL NAA+PROBE: SIGNIFICANT CHANGE UP
SPECIMEN SOURCE: SIGNIFICANT CHANGE UP

## 2024-09-11 PROCEDURE — 99285 EMERGENCY DEPT VISIT HI MDM: CPT

## 2024-09-11 PROCEDURE — 72156 MRI NECK SPINE W/O & W/DYE: CPT | Mod: MC

## 2024-09-11 PROCEDURE — 97165 OT EVAL LOW COMPLEX 30 MIN: CPT

## 2024-09-11 PROCEDURE — 72156 MRI NECK SPINE W/O & W/DYE: CPT | Mod: 26

## 2024-09-11 PROCEDURE — A9585: CPT

## 2024-09-11 PROCEDURE — 72157 MRI CHEST SPINE W/O & W/DYE: CPT | Mod: MC

## 2024-09-11 PROCEDURE — 93005 ELECTROCARDIOGRAM TRACING: CPT

## 2024-09-11 PROCEDURE — 36415 COLL VENOUS BLD VENIPUNCTURE: CPT

## 2024-09-11 PROCEDURE — 80053 COMPREHEN METABOLIC PANEL: CPT

## 2024-09-11 PROCEDURE — 72157 MRI CHEST SPINE W/O & W/DYE: CPT | Mod: 26

## 2024-09-11 PROCEDURE — 99232 SBSQ HOSP IP/OBS MODERATE 35: CPT | Mod: GC

## 2024-09-11 PROCEDURE — 96374 THER/PROPH/DIAG INJ IV PUSH: CPT

## 2024-09-11 PROCEDURE — 87086 URINE CULTURE/COLONY COUNT: CPT

## 2024-09-11 PROCEDURE — 99239 HOSP IP/OBS DSCHRG MGMT >30: CPT

## 2024-09-11 PROCEDURE — 85025 COMPLETE CBC W/AUTO DIFF WBC: CPT

## 2024-09-11 PROCEDURE — 96375 TX/PRO/DX INJ NEW DRUG ADDON: CPT

## 2024-09-11 PROCEDURE — 97110 THERAPEUTIC EXERCISES: CPT

## 2024-09-11 PROCEDURE — 72158 MRI LUMBAR SPINE W/O & W/DYE: CPT | Mod: MC

## 2024-09-11 PROCEDURE — 81001 URINALYSIS AUTO W/SCOPE: CPT

## 2024-09-11 PROCEDURE — 97162 PT EVAL MOD COMPLEX 30 MIN: CPT

## 2024-09-11 PROCEDURE — 72158 MRI LUMBAR SPINE W/O & W/DYE: CPT | Mod: 26

## 2024-09-11 RX ORDER — AMLODIPINE BESYLATE 5 MG
5 TABLET ORAL DAILY
Refills: 0 | Status: DISCONTINUED | OUTPATIENT
Start: 2024-09-12 | End: 2024-09-24

## 2024-09-11 RX ORDER — DEXTROAMPHETAMINE SACCHARATE, AMPHETAMINE ASPARTATE MONOHYDRATE, DEXTROAMPHETAMINE SULFATE, AMPHETAMINE SULFATE 6.25; 6.25; 6.25; 6.25 MG/1; MG/1; MG/1; MG/1
1 CAPSULE, EXTENDED RELEASE ORAL
Qty: 0 | Refills: 0 | DISCHARGE
Start: 2024-09-11

## 2024-09-11 RX ORDER — TRAMADOL HYDROCHLORIDE 50 MG/1
25 TABLET, COATED ORAL EVERY 6 HOURS
Refills: 0 | Status: DISCONTINUED | OUTPATIENT
Start: 2024-09-11 | End: 2024-09-16

## 2024-09-11 RX ORDER — AMLODIPINE BESYLATE 10 MG/1
1 TABLET ORAL
Refills: 0 | DISCHARGE

## 2024-09-11 RX ORDER — BACLOFEN 10 MG/20ML
1 INJECTION INTRATHECAL
Refills: 0 | DISCHARGE

## 2024-09-11 RX ORDER — AMLODIPINE BESYLATE 10 MG/1
1 TABLET ORAL
Qty: 0 | Refills: 0 | DISCHARGE
Start: 2024-09-11

## 2024-09-11 RX ORDER — ENOXAPARIN SODIUM 150 MG/ML
30 INJECTION SUBCUTANEOUS EVERY 24 HOURS
Refills: 0 | Status: DISCONTINUED | OUTPATIENT
Start: 2024-09-11 | End: 2024-09-24

## 2024-09-11 RX ORDER — DEXTROAMPHETAMINE SULFATE, DEXTROAMPHETAMINE SACCHARATE, AMPHETAMINE SULFATE AND AMPHETAMINE ASPARTATE 6.25; 6.25; 6.25; 6.25 MG/1; MG/1; MG/1; MG/1
10 CAPSULE, EXTENDED RELEASE ORAL
Refills: 0 | Status: DISCONTINUED | OUTPATIENT
Start: 2024-09-12 | End: 2024-09-13

## 2024-09-11 RX ORDER — GABAPENTIN 100 MG
2 CAPSULE ORAL
Qty: 0 | Refills: 0 | DISCHARGE
Start: 2024-09-11

## 2024-09-11 RX ORDER — ACETAMINOPHEN 325 MG
650 TABLET ORAL EVERY 6 HOURS
Refills: 0 | Status: DISCONTINUED | OUTPATIENT
Start: 2024-09-11 | End: 2024-09-24

## 2024-09-11 RX ORDER — PRIMIDONE 250 MG
50 TABLET ORAL
Refills: 0 | Status: DISCONTINUED | OUTPATIENT
Start: 2024-09-12 | End: 2024-09-24

## 2024-09-11 RX ORDER — LIDOCAINE 50 MG/G
1 CREAM TOPICAL DAILY
Refills: 0 | Status: DISCONTINUED | OUTPATIENT
Start: 2024-09-12 | End: 2024-09-24

## 2024-09-11 RX ORDER — TRAMADOL HYDROCHLORIDE 200 MG/1
0.5 TABLET, EXTENDED RELEASE ORAL
Qty: 0 | Refills: 0 | DISCHARGE
Start: 2024-09-11

## 2024-09-11 RX ORDER — AMITRIPTYLINE HCL 25 MG
25 TABLET ORAL
Refills: 0 | Status: DISCONTINUED | OUTPATIENT
Start: 2024-09-11 | End: 2024-09-13

## 2024-09-11 RX ORDER — ONDANSETRON HCL/PF 4 MG/2 ML
4 VIAL (ML) INJECTION EVERY 8 HOURS
Refills: 0 | Status: DISCONTINUED | OUTPATIENT
Start: 2024-09-11 | End: 2024-09-24

## 2024-09-11 RX ORDER — GABAPENTIN 800 MG/1
600 TABLET, FILM COATED ORAL
Refills: 0 | Status: DISCONTINUED | OUTPATIENT
Start: 2024-09-12 | End: 2024-09-24

## 2024-09-11 RX ORDER — MAG HYDROX/ALUMINUM HYD/SIMETH 200-200-20
30 SUSPENSION, ORAL (FINAL DOSE FORM) ORAL EVERY 4 HOURS
Refills: 0 | Status: DISCONTINUED | OUTPATIENT
Start: 2024-09-11 | End: 2024-09-24

## 2024-09-11 RX ORDER — TRAMADOL HYDROCHLORIDE 200 MG/1
1 TABLET, EXTENDED RELEASE ORAL
Qty: 0 | Refills: 0 | DISCHARGE
Start: 2024-09-11

## 2024-09-11 RX ORDER — GABAPENTIN 400 MG/1
1 CAPSULE ORAL
Refills: 0 | DISCHARGE

## 2024-09-11 RX ORDER — LIDOCAINE/BENZALKONIUM/ALCOHOL
1 SOLUTION, NON-ORAL TOPICAL
Qty: 0 | Refills: 0 | DISCHARGE
Start: 2024-09-11

## 2024-09-11 RX ORDER — BACLOFEN 0.5 MG/ML
1 INJECTION INTRATHECAL
Qty: 0 | Refills: 0 | DISCHARGE
Start: 2024-09-11

## 2024-09-11 RX ORDER — BACLOFEN 100 %
20 POWDER (GRAM) MISCELLANEOUS EVERY 8 HOURS
Refills: 0 | Status: DISCONTINUED | OUTPATIENT
Start: 2024-09-11 | End: 2024-09-24

## 2024-09-11 RX ORDER — INFLUENZA VIRUS VACCINE 15; 15; 15; 15 UG/.5ML; UG/.5ML; UG/.5ML; UG/.5ML
0.5 SUSPENSION INTRAMUSCULAR ONCE
Refills: 0 | Status: DISCONTINUED | OUTPATIENT
Start: 2024-09-11 | End: 2024-09-24

## 2024-09-11 RX ORDER — TIZANIDINE 4 MG/1
2 TABLET ORAL
Refills: 0 | DISCHARGE

## 2024-09-11 RX ORDER — PANTOPRAZOLE SODIUM 40 MG/1
40 TABLET, DELAYED RELEASE ORAL
Refills: 0 | Status: DISCONTINUED | OUTPATIENT
Start: 2024-09-11 | End: 2024-09-24

## 2024-09-11 RX ORDER — 5-HYDROXYTRYPTOPHAN (5-HTP) 100 MG
6 TABLET,DISINTEGRATING ORAL AT BEDTIME
Refills: 0 | Status: DISCONTINUED | OUTPATIENT
Start: 2024-09-11 | End: 2024-09-24

## 2024-09-11 RX ORDER — PRIMIDONE 50 MG/1
1 TABLET ORAL
Refills: 0 | DISCHARGE

## 2024-09-11 RX ORDER — TRAMADOL HYDROCHLORIDE 50 MG/1
50 TABLET, COATED ORAL EVERY 12 HOURS
Refills: 0 | Status: DISCONTINUED | OUTPATIENT
Start: 2024-09-11 | End: 2024-09-16

## 2024-09-11 RX ORDER — ACETAMINOPHEN 325 MG/1
2 TABLET ORAL
Qty: 0 | Refills: 0 | DISCHARGE
Start: 2024-09-11

## 2024-09-11 RX ORDER — PRIMIDONE 50 MG/1
1 TABLET ORAL
Qty: 0 | Refills: 0 | DISCHARGE
Start: 2024-09-11

## 2024-09-11 RX ADMIN — TRAMADOL HYDROCHLORIDE 50 MILLIGRAM(S): 50 TABLET, COATED ORAL at 23:00

## 2024-09-11 RX ADMIN — Medication 1 PATCH: at 17:38

## 2024-09-11 RX ADMIN — PRIMIDONE 50 MILLIGRAM(S): 50 TABLET ORAL at 18:02

## 2024-09-11 RX ADMIN — Medication 600 MILLIGRAM(S): at 17:38

## 2024-09-11 RX ADMIN — BACLOFEN 20 MILLIGRAM(S): 0.5 INJECTION INTRATHECAL at 06:12

## 2024-09-11 RX ADMIN — BACLOFEN 20 MILLIGRAM(S): 0.5 INJECTION INTRATHECAL at 17:38

## 2024-09-11 RX ADMIN — TRAMADOL HYDROCHLORIDE 50 MILLIGRAM(S): 50 TABLET, COATED ORAL at 22:25

## 2024-09-11 RX ADMIN — DEXTROAMPHETAMINE SACCHARATE, AMPHETAMINE ASPARTATE MONOHYDRATE, DEXTROAMPHETAMINE SULFATE, AMPHETAMINE SULFATE 10 MILLIGRAM(S): 6.25; 6.25; 6.25; 6.25 CAPSULE, EXTENDED RELEASE ORAL at 10:15

## 2024-09-11 RX ADMIN — Medication 25 MILLIGRAM(S): at 10:15

## 2024-09-11 RX ADMIN — PRIMIDONE 50 MILLIGRAM(S): 50 TABLET ORAL at 06:11

## 2024-09-11 RX ADMIN — Medication 600 MILLIGRAM(S): at 06:11

## 2024-09-11 RX ADMIN — Medication 1 PATCH: at 05:10

## 2024-09-11 RX ADMIN — AMLODIPINE BESYLATE 5 MILLIGRAM(S): 10 TABLET ORAL at 06:11

## 2024-09-11 NOTE — H&P ADULT - NSHPPHYSICALEXAM_GEN_ALL_CORE
PHYSICAL EXAM  VITALS  T(C): 36.6 (09-11-24 @ 05:16), Max: 36.6 (09-10-24 @ 21:02)  HR: 75 (09-11-24 @ 06:16) (74 - 114)  BP: 135/86 (09-11-24 @ 06:16) (135/86 - 153/92)  RR: 18 (09-10-24 @ 21:02) (18 - 18)  SpO2: 99% (09-10-24 @ 21:02) (99% - 99%)    Gen - NAD, Comfortable  HEENT - NCAT, EOMI, MMM, PERRLA, Normal Conjunctivae  Neck - Supple, No limited ROM  Pulm - CTAB, No wheeze, No rhonchi, No crackles  Cardiovascular - RRR, S1S2, No murmurs  Chest - good chest expansion, good respiratory effort  Abdomen - Soft, NT/ND, +BS  Extremities - No C/C/E, no calf tenderness  Neuro-     Cognitive - awake, alert, oriented to person, place, date, year, and situation.  Able to follow command     Communication - Fluent, Comprehensible, No dysarthria, No aphasia      Cranial Nerves -No facial asymmetry, Tongue midline, EOMI, Shoulder shrug intact     Motor -                    LEFT    UE - ShAB 4/5, EF 4/5, EE 4/5,  3/5                    RIGHT UE - ShAB 4/5, EF 5/5, EE 5/5,   4/5                    LEFT    LE - HF 2/5, KE 3/5, DF 2/5, PF 4/5                    RIGHT LE - HF 3/5, KE 5/5, DF 5/5, PF 5/5        Sensory - Intact to LT bilaterally     Tone - normal  MSK: soreness to back and LLE  Psychiatric - anxious   Skin:  all skin intact, psoriatic plaques

## 2024-09-11 NOTE — DISCHARGE NOTE NURSING/CASE MANAGEMENT/SOCIAL WORK - PATIENT PORTAL LINK FT
You can access the FollowMyHealth Patient Portal offered by [facility] by registering at the following website: [email or web address]. By joining [employer]’s FollowMyHealth portal, you will also be able to view your health information using other applications (apps) compatible with our system.

## 2024-09-11 NOTE — H&P ADULT - NS ATTEND AMEND GEN_ALL_CORE FT
I independently performed the documented the history, exam, and medical decision making. I have made amendments to the documentation where necessary. I have personally seen and examined the patient. Medical records were reviewed and I have made amendments to the documentation where necessary and adjusted the history, physical examination, and plan as documented by the Nurse Practitioner. Patient was seen and evaluated at bedside today. Reported no overnight events and is in no acute distress. Eager to participate on the recommended rehabilitation program. Denies any CP, SOB, ARROYO, palpitations, fever, chills, body aches, cough, congestion, or any other symptoms at this time. Admission vitals, labs, and physical exam are outlined below.    LAB                        13.5   6.40  )-----------( 316      ( 12 Sep 2024 06:23 )             40.4                   Urinalysis Basic - ( 10 Sep 2024 09:55 )    Color: Yellow / Appearance: Clear / S.008 / pH: x  Gluc: x / Ketone: Negative mg/dL  / Bili: Negative / Urobili: 0.2 mg/dL   Blood: x / Protein: Negative mg/dL / Nitrite: Negative   Leuk Esterase: Negative / RBC: 0 /HPF / WBC 0 /HPF   Sq Epi: x / Non Sq Epi: x / Bacteria: Negative /HPF        Culture - Urine (collected 10 Sep 2024 09:55)  Source: Clean Catch Clean Catch (Midstream)  Final Report (11 Sep 2024 15:28):    <10,000 CFU/mL Normal Urogenital Lexi      PHYSICAL EXAM  Vital Signs Last 24 Hrs  T(C): 36.6 (11 Sep 2024 20:08), Max: 36.6 (11 Sep 2024 20:08)  T(F): 97.8 (11 Sep 2024 20:08), Max: 97.8 (11 Sep 2024 20:08)  HR: 73 (12 Sep 2024 05:00) (73 - 96)  BP: 127/80 (12 Sep 2024 05:00) (127/79 - 134/84)  BP(mean): --  RR: 16 (11 Sep 2024 20:08) (16 - 16)  SpO2: 98% (11 Sep 2024 20:08) (98% - 98%)    Parameters below as of 11 Sep 2024 20:08  Patient On (Oxygen Delivery Method): room air I independently performed the documented the history, exam, and medical decision making. I have made amendments to the documentation where necessary. I have personally seen and examined the patient. Medical records were reviewed and I have made amendments to the documentation where necessary and adjusted the history, physical examination, and plan as documented by the Nurse Practitioner. Patient was seen and evaluated at bedside today. Reported no overnight events and is in no acute distress. Eager to participate on the recommended rehabilitation program. Denies any CP, SOB, ARROYO, palpitations, fever, chills, body aches, cough, congestion, or any other symptoms at this time. Admission vitals, labs, and physical exam are outlined below.    LAB                        13.5   6.40  )-----------( 316      ( 12 Sep 2024 06:23 )             40.4                   Urinalysis Basic - ( 10 Sep 2024 09:55 )    Color: Yellow / Appearance: Clear / S.008 / pH: x  Gluc: x / Ketone: Negative mg/dL  / Bili: Negative / Urobili: 0.2 mg/dL   Blood: x / Protein: Negative mg/dL / Nitrite: Negative   Leuk Esterase: Negative / RBC: 0 /HPF / WBC 0 /HPF   Sq Epi: x / Non Sq Epi: x / Bacteria: Negative /HPF        Culture - Urine (collected 10 Sep 2024 09:55)  Source: Clean Catch Clean Catch (Midstream)  Final Report (11 Sep 2024 15:28):    <10,000 CFU/mL Normal Urogenital Lexi      PHYSICAL EXAM  Vital Signs Last 24 Hrs  T(C): 36.6 (11 Sep 2024 20:08), Max: 36.6 (11 Sep 2024 20:08)  T(F): 97.8 (11 Sep 2024 20:08), Max: 97.8 (11 Sep 2024 20:08)  HR: 73 (12 Sep 2024 05:00) (73 - 96)  BP: 127/80 (12 Sep 2024 05:00) (127/79 - 134/84)  RR: 16 (11 Sep 2024 20:08) (16 - 16)  SpO2: 98% (11 Sep 2024 20:08) (98% - 98%)    Gen - NAD, Comfortable  HEENT - NCAT, EOMI, MMM, PERRLA, Normal Conjunctivae  Neck - Supple, No limited ROM  Pulm - CTAB, No wheeze, No rhonchi, No crackles  Cardiovascular - RRR, S1S2, No murmurs  Chest - good chest expansion, good respiratory effort  Abdomen - Soft, NT/ND, +BS  Extremities - No C/C/E, no calf tenderness  Neuro-     Cognitive - awake, alert, oriented to person, place, date, year, and situation.  Able to follow command     Communication - Fluent, Comprehensible, No dysarthria, No aphasia      Cranial Nerves -No facial asymmetry, Tongue midline, EOMI, Shoulder shrug intact     Motor -                    LEFT    UE - ShAB 4/5, EF 4/5, EE 4/5,  3/5                    RIGHT UE - ShAB 4/5, EF 5/5, EE 5/5,   4/5                    LEFT    LE - HF 2/5, KE 3/5, DF 2/5, PF 4/5                    RIGHT LE - HF 3/5, KE 5/5, DF 5/5, PF 5/5        Sensory - Intact to LT bilaterally     Tone - normal  MSK: soreness to back and LLE  Psychiatric - anxious   Skin:  all skin intact, psoriatic plaques
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
## 2024-09-11 NOTE — DISCHARGE NOTE NURSING/CASE MANAGEMENT/SOCIAL WORK - NSDCPEFALRISK_GEN_ALL_CORE
For information on Fall & Injury Prevention, visit: [email or web address] OR  [email or web address] OR  https://www.cdc.gov/steadi/patient.html

## 2024-09-11 NOTE — PATIENT PROFILE ADULT - FALL HARM RISK - HARM RISK INTERVENTIONS
Assistance with ambulation/Assistance OOB with selected safe patient handling equipment/Communicate Risk of Fall with Harm to all staff/Discuss with provider need for PT consult/Monitor gait and stability/Provide patient with walking aids - walker, cane, crutches/Reinforce activity limits and safety measures with patient and family/Sit up slowly, dangle for a short time, stand at bedside before walking/Tailored Fall Risk Interventions/Toileting schedule using arm’s reach rule for commode and bathroom/Use of alarms - bed, chair and/or voice tab/Visual Cue: Yellow wristband and red socks/Bed in lowest position, wheels locked, appropriate side rails in place/Call bell, personal items and telephone in reach/Instruct patient to call for assistance before getting out of bed or chair/Non-slip footwear when patient is out of bed/[employer] to call system/Physically safe environment - no spills, clutter or unnecessary equipment/Purposeful Proactive Rounding/Room/bathroom lighting operational, light cord in reach
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
## 2024-09-11 NOTE — PATIENT PROFILE ADULT - PATIENT'S PREFERRED PRONOUN
Quality 110: Preventive Care And Screening: Influenza Immunization: Influenza Immunization Administered during Influenza season Detail Level: Simple Quality 111:Pneumonia Vaccination Status For Older Adults: Pneumococcal Vaccination not Administered or Previously Received, Reason not Otherwise Specified Her/She

## 2024-09-11 NOTE — PROGRESS NOTE ADULT - REASON FOR ADMISSION
difficulty ambulating, weakness, urinary urgency

## 2024-09-11 NOTE — H&P ADULT - NSHPREVIEWOFSYSTEMS_GEN_ALL_CORE
REVIEW OF SYSTEMS  Constitutional: No fever, No Chills, No fatigue  HEENT: No eye pain, No visual disturbances, No difficulty hearing  Pulm: No cough,  No shortness of breath  Cardio: No chest pain, No palpitations  GI:  No abdominal pain, No nausea, No vomiting, No diarrhea, + constipation  : No dysuria, No frequency, No hematuria  Neuro: No headaches, No memory loss, +weakness, + numbness/tingling, No tremors, + left leg heaviness  Skin:  No rashes, + psoriasis plaques  Endo: No temperature intolerance  MSK: No joint pain, No joint swelling, + LLE pain, No Neck pain,  + back pain  Psych:  No depression, + anxiety Constitutional: No fever, No Chills, No fatigue  HEENT: No eye pain, No visual disturbances, No difficulty hearing  Pulm: No cough,  No shortness of breath  Cardio: No chest pain, No palpitations  GI:  No abdominal pain, No nausea, No vomiting, No diarrhea, + constipation  : No dysuria, No frequency, No hematuria  Neuro: No headaches, No memory loss, +weakness, + numbness/tingling, No tremors, + left leg heaviness  Skin:  No rashes, + psoriasis plaques  Endo: No temperature intolerance  MSK: No joint pain, No joint swelling, + LLE pain, No Neck pain,  + back pain  Psych:  No depression, + anxiety
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
## 2024-09-11 NOTE — H&P ADULT - NSHPLABSRESULTS_GEN_ALL_CORE
Urinalysis Basic - ( 10 Sep 2024 09:55 )    Color: Yellow / Appearance: Clear / S.008 / pH: x  Gluc: x / Ketone: Negative mg/dL  / Bili: Negative / Urobili: 0.2 mg/dL   Blood: x / Protein: Negative mg/dL / Nitrite: Negative   Leuk Esterase: Negative / RBC: 0 /HPF / WBC 0 /HPF   Sq Epi: x / Non Sq Epi: x / Bacteria: Negative /HPF    Culture - Urine (collected 10 Sep 2024 09:55)  Source: Clean Catch Clean Catch (Midstream)  Final Report (11 Sep 2024 15:28):    <10,000 CFU/mL Normal Urogenital Lexi      Complete Blood Count + Automated Diff (24 @ 13:20)   WBC Count: 8.31 K/uL  RBC Count: 4.38 M/uL  Hemoglobin: 13.7 g/dL  Hematocrit: 40.7 %  Mean Cell Volume: 92.9 fl  Mean Cell Hemoglobin: 31.3 pg  Mean Cell Hemoglobin Conc: 33.7 gm/dL  Red Cell Distrib Width: 12.3 %  Platelet Count - Automated: 342 K/uL  Auto Neutrophil #: 5.40 K/uL  Auto Lymphocyte #: 1.78 K/uL  Auto Monocyte #: 0.67 K/uL  Auto Eosinophil #: 0.40 K/uL  Auto Basophil #: 0.05 K/uL  Auto Neutrophil %: 65.0: Differential percentages must be correlated with absolute numbers for   clinical significance. %  Auto Lymphocyte %: 21.4 %  Auto Monocyte %: 8.1 %  Auto Eosinophil %: 4.8 %  Auto Basophil %: 0.6 %  Auto Immature Granulocyte %: 0.1: (Includes meta, myelo and promyelocytes). Mild elevations in immature   granulocytes may be seen with many inflammatory processes and pregnancy;   clinical correlation suggested. %  Nucleated RBC: 0 /100 WBCs    Comprehensive Metabolic Panel (24 @ 13:20)   Sodium: 143 mmol/L  Potassium: 4.5 mmol/L  Chloride: 105 mmol/L  Carbon Dioxide: 32 mmol/L  Anion Gap: 6 mmol/L  Blood Urea Nitrogen: 15 mg/dL  Creatinine: 0.70 mg/dL  Glucose: 90 mg/dL  Calcium: 9.5 mg/dL  Protein Total: 7.1 g/dL  Albumin: 4.0 g/dL  Bilirubin Total: 0.3 mg/dL  Alkaline Phosphatase: 115 U/L  Aspartate Aminotransferase (AST/SGOT): 13 U/L  Alanine Aminotransferase (ALT/SGPT): 19 U/L  eGFR: 105: The estimated glomerular filtration rate (eGFR) calculation is based on   the  CKD-EPI creatinine equation, which is validated in male and   female population 18 years of age and older (N Engl J Med ;   [id number]). mL/min/1.73m2  < from: MR Lumbar Spine w/wo IV Cont (24 @ 13:49) >    IMPRESSION:    1.  No significant change, without new cord lesions or enhancement.  2.  Transitional anatomy, numbered as above.  3.  Stable demyelinating lesions throughout the cord and in the   brainstem, with associated volume loss.  4.  Stable small schwannoma at T1.  5.  Stable moderate to severe right C5-6 and left L4-5 foraminal stenoses.    < from: MR Thoracic Spine w/wo IV Cont (24 @ 13:49) >      IMPRESSION:    1.  No significant change, without new cord lesions or enhancement.  2.  Transitional anatomy, numbered as above.  3.  Stable demyelinating lesions throughout the cord and in the   brainstem, with associated volume loss.  4.  Stable small schwannoma at T1.  5.  Stable moderate to severe right C5-6 and left L4-5 foraminal stenoses.

## 2024-09-11 NOTE — PATIENT PROFILE ADULT - FUNCTIONAL ASSESSMENT - BASIC MOBILITY 6.
2-calculated by average/Not able to assess (calculate score using [facility] averaging method)
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
## 2024-09-12 LAB
ALBUMIN SERPL ELPH-MCNC: 3.5 G/DL — SIGNIFICANT CHANGE UP (ref 3.3–5)
ALP SERPL-CCNC: 106 U/L — SIGNIFICANT CHANGE UP (ref 40–120)
ALT FLD-CCNC: 17 U/L — SIGNIFICANT CHANGE UP (ref 10–45)
ANION GAP SERPL CALC-SCNC: 6 MMOL/L — SIGNIFICANT CHANGE UP (ref 5–17)
AST SERPL-CCNC: 11 U/L — SIGNIFICANT CHANGE UP (ref 10–40)
BASOPHILS # BLD AUTO: 0.03 K/UL — SIGNIFICANT CHANGE UP (ref 0–0.2)
BASOPHILS NFR BLD AUTO: 0.5 % — SIGNIFICANT CHANGE UP (ref 0–2)
BILIRUB SERPL-MCNC: 0.3 MG/DL — SIGNIFICANT CHANGE UP (ref 0.2–1.2)
BUN SERPL-MCNC: 15 MG/DL — SIGNIFICANT CHANGE UP (ref 7–23)
CALCIUM SERPL-MCNC: 9.4 MG/DL — SIGNIFICANT CHANGE UP (ref 8.4–10.5)
CHLORIDE SERPL-SCNC: 107 MMOL/L — SIGNIFICANT CHANGE UP (ref 96–108)
CO2 SERPL-SCNC: 31 MMOL/L — SIGNIFICANT CHANGE UP (ref 22–31)
CREAT SERPL-MCNC: 0.79 MG/DL — SIGNIFICANT CHANGE UP (ref 0.5–1.3)
EGFR: 91 ML/MIN/1.73M2 — SIGNIFICANT CHANGE UP
EOSINOPHIL # BLD AUTO: 0.96 K/UL — HIGH (ref 0–0.5)
EOSINOPHIL NFR BLD AUTO: 15 % — HIGH (ref 0–6)
GLUCOSE SERPL-MCNC: 107 MG/DL — HIGH (ref 70–99)
HCT VFR BLD CALC: 40.4 % — SIGNIFICANT CHANGE UP (ref 34.5–45)
HGB BLD-MCNC: 13.5 G/DL — SIGNIFICANT CHANGE UP (ref 11.5–15.5)
IMM GRANULOCYTES NFR BLD AUTO: 0.2 % — SIGNIFICANT CHANGE UP (ref 0–0.9)
LYMPHOCYTES # BLD AUTO: 1.85 K/UL — SIGNIFICANT CHANGE UP (ref 1–3.3)
LYMPHOCYTES # BLD AUTO: 28.9 % — SIGNIFICANT CHANGE UP (ref 13–44)
MCHC RBC-ENTMCNC: 31.5 PG — SIGNIFICANT CHANGE UP (ref 27–34)
MCHC RBC-ENTMCNC: 33.4 GM/DL — SIGNIFICANT CHANGE UP (ref 32–36)
MCV RBC AUTO: 94.2 FL — SIGNIFICANT CHANGE UP (ref 80–100)
MONOCYTES # BLD AUTO: 0.8 K/UL — SIGNIFICANT CHANGE UP (ref 0–0.9)
MONOCYTES NFR BLD AUTO: 12.5 % — SIGNIFICANT CHANGE UP (ref 2–14)
NEUTROPHILS # BLD AUTO: 2.75 K/UL — SIGNIFICANT CHANGE UP (ref 1.8–7.4)
NEUTROPHILS NFR BLD AUTO: 42.9 % — LOW (ref 43–77)
NRBC # BLD: 0 /100 WBCS — SIGNIFICANT CHANGE UP (ref 0–0)
PLATELET # BLD AUTO: 316 K/UL — SIGNIFICANT CHANGE UP (ref 150–400)
POTASSIUM SERPL-MCNC: 4.1 MMOL/L — SIGNIFICANT CHANGE UP (ref 3.5–5.3)
POTASSIUM SERPL-SCNC: 4.1 MMOL/L — SIGNIFICANT CHANGE UP (ref 3.5–5.3)
PROT SERPL-MCNC: 6.6 G/DL — SIGNIFICANT CHANGE UP (ref 6–8.3)
RBC # BLD: 4.29 M/UL — SIGNIFICANT CHANGE UP (ref 3.8–5.2)
RBC # FLD: 12.2 % — SIGNIFICANT CHANGE UP (ref 10.3–14.5)
SODIUM SERPL-SCNC: 144 MMOL/L — SIGNIFICANT CHANGE UP (ref 135–145)
WBC # BLD: 6.4 K/UL — SIGNIFICANT CHANGE UP (ref 3.8–10.5)
WBC # FLD AUTO: 6.4 K/UL — SIGNIFICANT CHANGE UP (ref 3.8–10.5)

## 2024-09-12 PROCEDURE — 99255 IP/OBS CONSLTJ NEW/EST HI 80: CPT

## 2024-09-12 PROCEDURE — 99223 1ST HOSP IP/OBS HIGH 75: CPT

## 2024-09-12 RX ADMIN — TRAMADOL HYDROCHLORIDE 50 MILLIGRAM(S): 50 TABLET, COATED ORAL at 20:18

## 2024-09-12 RX ADMIN — Medication 20 MILLIGRAM(S): at 05:24

## 2024-09-12 RX ADMIN — DEXTROAMPHETAMINE SULFATE, DEXTROAMPHETAMINE SACCHARATE, AMPHETAMINE SULFATE AND AMPHETAMINE ASPARTATE 10 MILLIGRAM(S): 6.25; 6.25; 6.25; 6.25 CAPSULE, EXTENDED RELEASE ORAL at 12:49

## 2024-09-12 RX ADMIN — ENOXAPARIN SODIUM 30 MILLIGRAM(S): 150 INJECTION SUBCUTANEOUS at 05:23

## 2024-09-12 RX ADMIN — TRAMADOL HYDROCHLORIDE 50 MILLIGRAM(S): 50 TABLET, COATED ORAL at 21:48

## 2024-09-12 RX ADMIN — GABAPENTIN 600 MILLIGRAM(S): 800 TABLET, FILM COATED ORAL at 05:24

## 2024-09-12 RX ADMIN — Medication 20 MILLIGRAM(S): at 14:51

## 2024-09-12 RX ADMIN — Medication 17 GRAM(S): at 12:48

## 2024-09-12 RX ADMIN — Medication 50 MILLIGRAM(S): at 09:32

## 2024-09-12 RX ADMIN — Medication 25 MILLIGRAM(S): at 09:31

## 2024-09-12 RX ADMIN — LIDOCAINE 1 PATCH: 50 CREAM TOPICAL at 12:49

## 2024-09-12 RX ADMIN — Medication 6 MILLIGRAM(S): at 23:07

## 2024-09-12 RX ADMIN — Medication 650 MILLIGRAM(S): at 09:37

## 2024-09-12 RX ADMIN — Medication 20 MILLIGRAM(S): at 21:01

## 2024-09-12 RX ADMIN — Medication 5 MILLIGRAM(S): at 05:23

## 2024-09-12 RX ADMIN — LIDOCAINE 1 PATCH: 50 CREAM TOPICAL at 21:00

## 2024-09-12 RX ADMIN — GABAPENTIN 600 MILLIGRAM(S): 800 TABLET, FILM COATED ORAL at 17:11

## 2024-09-12 RX ADMIN — Medication 50 MILLIGRAM(S): at 17:12

## 2024-09-12 NOTE — DIETITIAN INITIAL EVALUATION ADULT - ADD RECOMMEND
1) Monitor Weights, Intake, Tolerance, Skin & Labwork  2) Recommend Change Diet to Regular  3) Education Provided on Proper Nutrition   4) Continue Nutrition Plan of Care
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
## 2024-09-12 NOTE — DIETITIAN INITIAL EVALUATION ADULT - PHYSCIAL ASSESSMENT
patient stated that  she weighted 97 at the last facility   however she usually weight 105 lbs  Weight Stable over Last 6 Months (Per Patient)

## 2024-09-12 NOTE — DIETITIAN INITIAL EVALUATION ADULT - PERTINENT LABORATORY DATA
09-12    144  |  107  |  15  ----------------------------<  107<H>  4.1   |  31  |  0.79    Ca    9.4      12 Sep 2024 06:23    TPro  6.6  /  Alb  3.5  /  TBili  0.3  /  DBili  x   /  AST  11  /  ALT  17  /  AlkPhos  106  09-12

## 2024-09-12 NOTE — DIETITIAN INITIAL EVALUATION ADULT - NS FNS DIET ORDER
Diet, DASH/TLC:  removed  recommend  to remove the previous to  diet to regular  Declines Nutrition Supplementation or Multivitamin

## 2024-09-12 NOTE — DIETITIAN INITIAL EVALUATION ADULT - ORAL INTAKE PTA/DIET HISTORY
Patient states that has consistent mediterranean  diet .    Patient  eats  all the meals: breakfast,  dinner but snacks at lunch.

## 2024-09-12 NOTE — DIETITIAN INITIAL EVALUATION ADULT - PERTINENT MEDS FT
MEDICATIONS  (STANDING):  amitriptyline 25 milliGRAM(s) Oral <User Schedule>  amLODIPine   Tablet 5 milliGRAM(s) Oral daily  amphetamine/dextroamphetamine 10 milliGRAM(s) Oral <User Schedule>  baclofen 20 milliGRAM(s) Oral every 8 hours  enoxaparin Injectable 30 milliGRAM(s) SubCutaneous every 24 hours  gabapentin 600 milliGRAM(s) Oral two times a day  influenza   Vaccine 0.5 milliLiter(s) IntraMuscular once  lidocaine   4% Patch 1 Patch Transdermal daily  pantoprazole    Tablet 40 milliGRAM(s) Oral before breakfast  polyethylene glycol 3350 17 Gram(s) Oral daily  primidone 50 milliGRAM(s) Oral two times a day    MEDICATIONS  (PRN):  acetaminophen     Tablet .. 650 milliGRAM(s) Oral every 6 hours PRN Temp greater or equal to 38C (100.4F), Mild Pain (1 - 3)  aluminum hydroxide/magnesium hydroxide/simethicone Suspension 30 milliLiter(s) Oral every 4 hours PRN Dyspepsia  melatonin 6 milliGRAM(s) Oral at bedtime PRN Insomnia  ondansetron Injectable 4 milliGRAM(s) IV Push every 8 hours PRN Nausea and/or Vomiting  traMADol 25 milliGRAM(s) Oral every 6 hours PRN Moderate Pain (4 - 6)  traMADol 50 milliGRAM(s) Oral every 12 hours PRN Severe Pain (7 - 10)
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
## 2024-09-12 NOTE — DIETITIAN INITIAL EVALUATION ADULT - OTHER INFO
Initial Nutrition Assessment   49yr Old Female   Denies Food Allergy/Intolerance  Tolerates Diet Well - No Chewing/Swallowing Complications (Per Patient)  No Pressure Ulcers (as Per Nursing Flow Sheets)  No Edema Noted (as Per Nursing Flow Sheets)  No Recent Nausea/Vomiting/Diarrhea & Some Recent Constipation (as Per Patient)

## 2024-09-12 NOTE — DIETITIAN NUTRITION RISK NOTIFICATION - TREATMENT: THE FOLLOWING DIET HAS BEEN RECOMMENDED
Diet, DASH/TLC:  removed  recommend  change of diet to regular  decline multivitamins and supplements

## 2024-09-13 PROCEDURE — 99232 SBSQ HOSP IP/OBS MODERATE 35: CPT

## 2024-09-13 PROCEDURE — 90834 PSYTX W PT 45 MINUTES: CPT

## 2024-09-13 RX ORDER — DEXTROAMPHETAMINE SULFATE, DEXTROAMPHETAMINE SACCHARATE, AMPHETAMINE SULFATE AND AMPHETAMINE ASPARTATE 6.25; 6.25; 6.25; 6.25 MG/1; MG/1; MG/1; MG/1
10 CAPSULE, EXTENDED RELEASE ORAL
Refills: 0 | Status: DISCONTINUED | OUTPATIENT
Start: 2024-09-13 | End: 2024-09-16

## 2024-09-13 RX ORDER — AMITRIPTYLINE HCL 25 MG
50 TABLET ORAL DAILY
Refills: 0 | Status: DISCONTINUED | OUTPATIENT
Start: 2024-09-14 | End: 2024-09-15

## 2024-09-13 RX ORDER — 5-HYDROXYTRYPTOPHAN (5-HTP) 100 MG
3 TABLET,DISINTEGRATING ORAL AT BEDTIME
Refills: 0 | Status: DISCONTINUED | OUTPATIENT
Start: 2024-09-13 | End: 2024-09-13

## 2024-09-13 RX ORDER — SENNOSIDES 8.6 MG
2 TABLET ORAL ONCE
Refills: 0 | Status: COMPLETED | OUTPATIENT
Start: 2024-09-13 | End: 2024-09-13

## 2024-09-13 RX ORDER — DEXTROAMPHETAMINE SULFATE, DEXTROAMPHETAMINE SACCHARATE, AMPHETAMINE SULFATE AND AMPHETAMINE ASPARTATE 6.25; 6.25; 6.25; 6.25 MG/1; MG/1; MG/1; MG/1
10 CAPSULE, EXTENDED RELEASE ORAL
Refills: 0 | Status: DISCONTINUED | OUTPATIENT
Start: 2024-09-13 | End: 2024-09-13

## 2024-09-13 RX ORDER — ANTI-ITCH CREAM 1 G/100G
1 OINTMENT TOPICAL
Refills: 0 | Status: DISCONTINUED | OUTPATIENT
Start: 2024-09-13 | End: 2024-09-24

## 2024-09-13 RX ADMIN — Medication 50 MILLIGRAM(S): at 18:34

## 2024-09-13 RX ADMIN — TRAMADOL HYDROCHLORIDE 50 MILLIGRAM(S): 50 TABLET, COATED ORAL at 22:15

## 2024-09-13 RX ADMIN — ANTI-ITCH CREAM 1 APPLICATION(S): 1 OINTMENT TOPICAL at 17:01

## 2024-09-13 RX ADMIN — LIDOCAINE 1 PATCH: 50 CREAM TOPICAL at 19:00

## 2024-09-13 RX ADMIN — ENOXAPARIN SODIUM 30 MILLIGRAM(S): 150 INJECTION SUBCUTANEOUS at 06:38

## 2024-09-13 RX ADMIN — Medication 50 MILLIGRAM(S): at 06:38

## 2024-09-13 RX ADMIN — Medication 17 GRAM(S): at 14:21

## 2024-09-13 RX ADMIN — Medication 20 MILLIGRAM(S): at 14:20

## 2024-09-13 RX ADMIN — Medication 20 MILLIGRAM(S): at 06:37

## 2024-09-13 RX ADMIN — GABAPENTIN 600 MILLIGRAM(S): 800 TABLET, FILM COATED ORAL at 06:37

## 2024-09-13 RX ADMIN — GABAPENTIN 600 MILLIGRAM(S): 800 TABLET, FILM COATED ORAL at 18:34

## 2024-09-13 RX ADMIN — LIDOCAINE 1 PATCH: 50 CREAM TOPICAL at 14:20

## 2024-09-13 RX ADMIN — PANTOPRAZOLE SODIUM 40 MILLIGRAM(S): 40 TABLET, DELAYED RELEASE ORAL at 06:38

## 2024-09-13 RX ADMIN — Medication 2 TABLET(S): at 22:12

## 2024-09-13 RX ADMIN — Medication 5 MILLIGRAM(S): at 06:37

## 2024-09-13 RX ADMIN — Medication 20 MILLIGRAM(S): at 22:07

## 2024-09-13 RX ADMIN — Medication 6 MILLIGRAM(S): at 22:07

## 2024-09-13 RX ADMIN — Medication 25 MILLIGRAM(S): at 08:28

## 2024-09-13 RX ADMIN — LIDOCAINE 1 PATCH: 50 CREAM TOPICAL at 00:49
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
## 2024-09-13 NOTE — PROGRESS NOTE ADULT - ASSESSMENT
Patient answers all questions during the clinical interview. Her  provides collateral information. She is alert and attentive. Speech is fluent, comprehensible, somewhat pressured. Mood appears highly anxious, affect tearful throughout. Hallucinations or delusional thoughts are not elicited.     On self-report screening of recent mood, her responses suggest moderate-severe anxiety (DAVID-7 = 15/21) and moderate depression (PHQ-9 = 10/27), including feeling anxious, restless, easily annoyed/irritable, afraid something bad may happen to her, feeling down/depressed, feeling bad about self, etc. She denies ideation, plan, or intent to harm self/other.     Plan: Continue the assessment process. Follow-up supportive therapy to address mood/anxiety. Patient may benefit from  patio pass, as well as recreation/creative arts therapy to promote leisure and encourage task mastery. Will coordinate care with primary team. Neuropsychology remains available through discharge.
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
## 2024-09-13 NOTE — PROGRESS NOTE ADULT - SUBJECTIVE AND OBJECTIVE BOX
Interval History  Patient seen and examined at bedside. No acute events noted.  Patient feels okay but did not sleep well last night due to pruritis. She has psoriasis, halobetasol cream ordered. Bedsheet also changed to hypoallergic. She is participating with PT.     ALLERGIES:  No Known Allergies    MEDICATIONS  (STANDING):  amitriptyline 50 milliGRAM(s) Oral daily  amLODIPine   Tablet 5 milliGRAM(s) Oral daily  amphetamine/dextroamphetamine 10 milliGRAM(s) Oral <User Schedule>  baclofen 20 milliGRAM(s) Oral every 8 hours  enoxaparin Injectable 30 milliGRAM(s) SubCutaneous every 24 hours  gabapentin 600 milliGRAM(s) Oral two times a day  hydrocortisone 1% Cream 1 Application(s) Topical two times a day  influenza   Vaccine 0.5 milliLiter(s) IntraMuscular once  lidocaine   4% Patch 1 Patch Transdermal daily  pantoprazole    Tablet 40 milliGRAM(s) Oral before breakfast  polyethylene glycol 3350 17 Gram(s) Oral daily  primidone 50 milliGRAM(s) Oral two times a day    MEDICATIONS  (PRN):  acetaminophen     Tablet .. 650 milliGRAM(s) Oral every 6 hours PRN Temp greater or equal to 38C (100.4F), Mild Pain (1 - 3)  aluminum hydroxide/magnesium hydroxide/simethicone Suspension 30 milliLiter(s) Oral every 4 hours PRN Dyspepsia  Halobetasol 0.05% 1 Application(s) 1 Application(s) Topical three times a day PRN psoriatic flare-ups  melatonin 6 milliGRAM(s) Oral at bedtime PRN Insomnia  ondansetron Injectable 4 milliGRAM(s) IV Push every 8 hours PRN Nausea and/or Vomiting  traMADol 25 milliGRAM(s) Oral every 6 hours PRN Moderate Pain (4 - 6)  traMADol 50 milliGRAM(s) Oral every 12 hours PRN Severe Pain (7 - 10)    Vital Signs Last 24 Hrs  T(F): 98 (12 Sep 2024 20:08), Max: 98 (12 Sep 2024 20:08)  HR: 87 (13 Sep 2024 06:34) (87 - 104)  BP: 135/87 (13 Sep 2024 06:34) (121/80 - 135/87)  RR: 16 (12 Sep 2024 20:08) (16 - 16)  SpO2: 98% (12 Sep 2024 20:08) (98% - 98%)  I&O's Summary    BMI (kg/m2): 19.8 (09-13-24 @ 06:59), 17.7 (09-09-24 @ 12:43)    PHYSICAL EXAM:  GENERAL: NAD  HEENT: NCAT  CHEST/LUNG: Clear to percussion bilaterally; No rales, rhonchi, wheezing  HEART: Regular rate and rhythm; No murmurs  ABDOMEN: Soft, Nontender, Nondistended; Bowel sounds present  MUSCULOSKELETAL/EXTREMITIES:  2+ Peripheral Pulses, No LE edema  Skin: Scattered psoriatic plaques  PSYCH: Appropriate affect  NEURO: Alert & Oriented x 3, LE weakness (L>R)    I personally reviewed the below data/images/labs:    LABS:                        13.5   6.40  )-----------( 316      ( 12 Sep 2024 06:23 )             40.4       09-12    144  |  107  |  15  ----------------------------<  107  4.1   |  31  |  0.79    Ca    9.4      12 Sep 2024 06:23    TPro  6.6  /  Alb  3.5  /  TBili  0.3  /  DBili  x   /  AST  11  /  ALT  17  /  AlkPhos  106  09-12    Urinalysis Basic - ( 12 Sep 2024 06:23 )    Color: x / Appearance: x / SG: x / pH: x  Gluc: 107 mg/dL / Ketone: x  / Bili: x / Urobili: x   Blood: x / Protein: x / Nitrite: x   Leuk Esterase: x / RBC: x / WBC x   Sq Epi: x / Non Sq Epi: x / Bacteria: x    Culture - Urine (collected 10 Sep 2024 09:55)  Source: Clean Catch Clean Catch (Midstream)  Final Report (11 Sep 2024 15:28):    <10,000 CFU/mL Normal Urogenital Lexi    COVID-19 PCR: NotDetec (09-11-24 @ 19:24)      Consultant(s) Notes Reviewed:   Care Discused with Consultants/Other Providers:  Imaging Personally Reviewed:
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
## 2024-09-14 PROCEDURE — 99232 SBSQ HOSP IP/OBS MODERATE 35: CPT

## 2024-09-14 RX ORDER — DALFAMPRIDINE 10 MG/1
10 TABLET, FILM COATED, EXTENDED RELEASE ORAL EVERY 12 HOURS
Refills: 0 | Status: DISCONTINUED | OUTPATIENT
Start: 2024-09-14 | End: 2024-09-24

## 2024-09-14 RX ORDER — BISACODYL 5 MG/1
10 TABLET, COATED ORAL ONCE
Refills: 0 | Status: COMPLETED | OUTPATIENT
Start: 2024-09-14 | End: 2024-09-14

## 2024-09-14 RX ADMIN — TRAMADOL HYDROCHLORIDE 50 MILLIGRAM(S): 50 TABLET, COATED ORAL at 21:58

## 2024-09-14 RX ADMIN — Medication 20 MILLIGRAM(S): at 14:37

## 2024-09-14 RX ADMIN — Medication 50 MILLIGRAM(S): at 11:28

## 2024-09-14 RX ADMIN — LIDOCAINE 1 PATCH: 50 CREAM TOPICAL at 02:20

## 2024-09-14 RX ADMIN — ANTI-ITCH CREAM 1 APPLICATION(S): 1 OINTMENT TOPICAL at 17:47

## 2024-09-14 RX ADMIN — LIDOCAINE 1 PATCH: 50 CREAM TOPICAL at 19:42

## 2024-09-14 RX ADMIN — Medication 20 MILLIGRAM(S): at 06:26

## 2024-09-14 RX ADMIN — Medication 5 MILLIGRAM(S): at 06:26

## 2024-09-14 RX ADMIN — GABAPENTIN 600 MILLIGRAM(S): 800 TABLET, FILM COATED ORAL at 17:46

## 2024-09-14 RX ADMIN — Medication 17 GRAM(S): at 11:27

## 2024-09-14 RX ADMIN — DALFAMPRIDINE 10 MILLIGRAM(S): 10 TABLET, FILM COATED, EXTENDED RELEASE ORAL at 20:48

## 2024-09-14 RX ADMIN — Medication 20 MILLIGRAM(S): at 20:50

## 2024-09-14 RX ADMIN — TRAMADOL HYDROCHLORIDE 50 MILLIGRAM(S): 50 TABLET, COATED ORAL at 20:58

## 2024-09-14 RX ADMIN — LIDOCAINE 1 PATCH: 50 CREAM TOPICAL at 23:03

## 2024-09-14 RX ADMIN — LIDOCAINE 1 PATCH: 50 CREAM TOPICAL at 11:27

## 2024-09-14 RX ADMIN — Medication 50 MILLIGRAM(S): at 06:26

## 2024-09-14 RX ADMIN — Medication 50 MILLIGRAM(S): at 17:46

## 2024-09-14 RX ADMIN — ANTI-ITCH CREAM 1 APPLICATION(S): 1 OINTMENT TOPICAL at 06:36

## 2024-09-14 RX ADMIN — BISACODYL 10 MILLIGRAM(S): 5 TABLET, COATED ORAL at 14:36

## 2024-09-14 RX ADMIN — DEXTROAMPHETAMINE SULFATE, DEXTROAMPHETAMINE SACCHARATE, AMPHETAMINE SULFATE AND AMPHETAMINE ASPARTATE 10 MILLIGRAM(S): 6.25; 6.25; 6.25; 6.25 CAPSULE, EXTENDED RELEASE ORAL at 06:31

## 2024-09-14 RX ADMIN — ENOXAPARIN SODIUM 30 MILLIGRAM(S): 150 INJECTION SUBCUTANEOUS at 06:27

## 2024-09-14 RX ADMIN — PANTOPRAZOLE SODIUM 40 MILLIGRAM(S): 40 TABLET, DELAYED RELEASE ORAL at 06:26

## 2024-09-14 RX ADMIN — GABAPENTIN 600 MILLIGRAM(S): 800 TABLET, FILM COATED ORAL at 06:26

## 2024-09-14 RX ADMIN — DEXTROAMPHETAMINE SULFATE, DEXTROAMPHETAMINE SACCHARATE, AMPHETAMINE SULFATE AND AMPHETAMINE ASPARTATE 10 MILLIGRAM(S): 6.25; 6.25; 6.25; 6.25 CAPSULE, EXTENDED RELEASE ORAL at 12:46

## 2024-09-14 NOTE — BH CONSULTATION LIAISON ASSESSMENT NOTE - NSBHCHARTREVIEWVS_PSY_A_CORE FT
Vital Signs Last 24 Hrs  T(C): 36.4 (13 Sep 2024 22:05), Max: 36.4 (13 Sep 2024 22:05)  T(F): 97.6 (13 Sep 2024 22:05), Max: 97.6 (13 Sep 2024 22:05)  HR: 82 (14 Sep 2024 06:25) (82 - 82)  BP: 104/71 (14 Sep 2024 06:25) (104/71 - 127/80)  BP(mean): --  RR: 16 (13 Sep 2024 22:05) (16 - 16)  SpO2: 99% (13 Sep 2024 22:05) (99% - 99%)    Parameters below as of 13 Sep 2024 22:05  Patient On (Oxygen Delivery Method): room air
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
## 2024-09-14 NOTE — BH CONSULTATION LIAISON ASSESSMENT NOTE - NSSUICPROTFACT_PSY_ALL_CORE
Responsibility to children, family, or others/Identifies reasons for living/Supportive social network of family or friends/Cultural, spiritual and/or moral attitudes against suicide/Positive therapeutic relationships/Ability to cope with stress

## 2024-09-14 NOTE — BH CONSULTATION LIAISON ASSESSMENT NOTE - SUICIDALITY
Denies known Latex allergy or symptoms of Latex sensitivity.  Medications verified, no changes.  Weight 185# per patient.    Here for diabetes check.    No

## 2024-09-14 NOTE — BH CONSULTATION LIAISON ASSESSMENT NOTE - CURRENT MEDICATION
MEDICATIONS  (STANDING):  amitriptyline 50 milliGRAM(s) Oral daily  amLODIPine   Tablet 5 milliGRAM(s) Oral daily  amphetamine/dextroamphetamine 10 milliGRAM(s) Oral <User Schedule>  baclofen 20 milliGRAM(s) Oral every 8 hours  dalfampridine ER 10 milliGRAM(s) Oral every 12 hours  enoxaparin Injectable 30 milliGRAM(s) SubCutaneous every 24 hours  gabapentin 600 milliGRAM(s) Oral two times a day  hydrocortisone 1% Cream 1 Application(s) Topical two times a day  influenza   Vaccine 0.5 milliLiter(s) IntraMuscular once  lidocaine   4% Patch 1 Patch Transdermal daily  pantoprazole    Tablet 40 milliGRAM(s) Oral before breakfast  polyethylene glycol 3350 17 Gram(s) Oral daily  primidone 50 milliGRAM(s) Oral two times a day    MEDICATIONS  (PRN):  acetaminophen     Tablet .. 650 milliGRAM(s) Oral every 6 hours PRN Temp greater or equal to 38C (100.4F), Mild Pain (1 - 3)  aluminum hydroxide/magnesium hydroxide/simethicone Suspension 30 milliLiter(s) Oral every 4 hours PRN Dyspepsia  Halobetasol 0.05% 1 Application(s) 1 Application(s) Topical three times a day PRN psoriatic flare-ups  melatonin 6 milliGRAM(s) Oral at bedtime PRN Insomnia  ondansetron Injectable 4 milliGRAM(s) IV Push every 8 hours PRN Nausea and/or Vomiting  traMADol 25 milliGRAM(s) Oral every 6 hours PRN Moderate Pain (4 - 6)  traMADol 50 milliGRAM(s) Oral every 12 hours PRN Severe Pain (7 - 10)
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
## 2024-09-15 PROCEDURE — 99232 SBSQ HOSP IP/OBS MODERATE 35: CPT

## 2024-09-15 PROCEDURE — 90791 PSYCH DIAGNOSTIC EVALUATION: CPT

## 2024-09-15 RX ORDER — AMITRIPTYLINE HCL 25 MG
50 TABLET ORAL AT BEDTIME
Refills: 0 | Status: DISCONTINUED | OUTPATIENT
Start: 2024-09-15 | End: 2024-09-24

## 2024-09-15 RX ORDER — BISACODYL 5 MG/1
10 TABLET, COATED ORAL DAILY
Refills: 0 | Status: DISCONTINUED | OUTPATIENT
Start: 2024-09-15 | End: 2024-09-24

## 2024-09-15 RX ORDER — ESCITALOPRAM OXALATE 10 MG
5 TABLET ORAL DAILY
Refills: 0 | Status: DISCONTINUED | OUTPATIENT
Start: 2024-09-15 | End: 2024-09-22

## 2024-09-15 RX ADMIN — GABAPENTIN 600 MILLIGRAM(S): 800 TABLET, FILM COATED ORAL at 18:03

## 2024-09-15 RX ADMIN — DALFAMPRIDINE 10 MILLIGRAM(S): 10 TABLET, FILM COATED, EXTENDED RELEASE ORAL at 20:49

## 2024-09-15 RX ADMIN — GABAPENTIN 600 MILLIGRAM(S): 800 TABLET, FILM COATED ORAL at 05:56

## 2024-09-15 RX ADMIN — TRAMADOL HYDROCHLORIDE 25 MILLIGRAM(S): 50 TABLET, COATED ORAL at 05:56

## 2024-09-15 RX ADMIN — DALFAMPRIDINE 10 MILLIGRAM(S): 10 TABLET, FILM COATED, EXTENDED RELEASE ORAL at 08:01

## 2024-09-15 RX ADMIN — Medication 5 MILLIGRAM(S): at 12:51

## 2024-09-15 RX ADMIN — PANTOPRAZOLE SODIUM 40 MILLIGRAM(S): 40 TABLET, DELAYED RELEASE ORAL at 05:56

## 2024-09-15 RX ADMIN — Medication 50 MILLIGRAM(S): at 05:57

## 2024-09-15 RX ADMIN — Medication 20 MILLIGRAM(S): at 20:48

## 2024-09-15 RX ADMIN — Medication 20 MILLIGRAM(S): at 13:59

## 2024-09-15 RX ADMIN — ANTI-ITCH CREAM 1 APPLICATION(S): 1 OINTMENT TOPICAL at 18:04

## 2024-09-15 RX ADMIN — ANTI-ITCH CREAM 1 APPLICATION(S): 1 OINTMENT TOPICAL at 05:57

## 2024-09-15 RX ADMIN — Medication 50 MILLIGRAM(S): at 18:03

## 2024-09-15 RX ADMIN — DEXTROAMPHETAMINE SULFATE, DEXTROAMPHETAMINE SACCHARATE, AMPHETAMINE SULFATE AND AMPHETAMINE ASPARTATE 10 MILLIGRAM(S): 6.25; 6.25; 6.25; 6.25 CAPSULE, EXTENDED RELEASE ORAL at 05:56

## 2024-09-15 RX ADMIN — DEXTROAMPHETAMINE SULFATE, DEXTROAMPHETAMINE SACCHARATE, AMPHETAMINE SULFATE AND AMPHETAMINE ASPARTATE 10 MILLIGRAM(S): 6.25; 6.25; 6.25; 6.25 CAPSULE, EXTENDED RELEASE ORAL at 12:50

## 2024-09-15 RX ADMIN — LIDOCAINE 1 PATCH: 50 CREAM TOPICAL at 12:50

## 2024-09-15 RX ADMIN — ENOXAPARIN SODIUM 30 MILLIGRAM(S): 150 INJECTION SUBCUTANEOUS at 05:55

## 2024-09-15 RX ADMIN — Medication 5 MILLIGRAM(S): at 05:56

## 2024-09-15 RX ADMIN — Medication 6 MILLIGRAM(S): at 20:48

## 2024-09-15 RX ADMIN — Medication 17 GRAM(S): at 12:51

## 2024-09-15 RX ADMIN — TRAMADOL HYDROCHLORIDE 50 MILLIGRAM(S): 50 TABLET, COATED ORAL at 20:48

## 2024-09-15 RX ADMIN — TRAMADOL HYDROCHLORIDE 25 MILLIGRAM(S): 50 TABLET, COATED ORAL at 06:42

## 2024-09-15 RX ADMIN — Medication 20 MILLIGRAM(S): at 05:56

## 2024-09-15 RX ADMIN — Medication 50 MILLIGRAM(S): at 20:48

## 2024-09-15 NOTE — PROGRESS NOTE ADULT - SUBJECTIVE AND OBJECTIVE BOX
PROGRESS NOTE:     Patient is a 49y old  Female who presents with a chief complaint of MS exacerbation (14 Sep 2024 16:10)          SUBJECTIVE & OBJECTIVE:   Pt seen and examined at bedside in AM    no overnight events.       REVIEW OF SYSTEMS: remaining ROS negative     PHYSICAL EXAM:  VITALS:  Vital Signs Last 24 Hrs  T(C): 36.6 (14 Sep 2024 20:33), Max: 36.6 (14 Sep 2024 20:33)  T(F): 97.8 (14 Sep 2024 20:33), Max: 97.8 (14 Sep 2024 20:33)  HR: 80 (15 Sep 2024 05:55) (80 - 94)  BP: 102/63 (15 Sep 2024 05:55) (102/63 - 138/89)  BP(mean): --  RR: 16 (14 Sep 2024 20:33) (16 - 16)  SpO2: 100% (14 Sep 2024 20:33) (100% - 100%)    Parameters below as of 14 Sep 2024 20:33  Patient On (Oxygen Delivery Method): room air          GENERAL: NAD,  no increased WOB  HEAD:  Atraumatic, Normocephalic  EYES: EOMI, conjunctiva and sclera clear  ENMT: Moist mucous membranes  NECK: Supple, No JVD  NERVOUS SYSTEM:  Alert & Oriented  CHEST/LUNG: Clear to auscultation bilaterally; No rales, rhonchi, wheezing  HEART: Regular rate and rhythm  ABDOMEN: Soft, Nontender, Nondistended; Bowel sounds present  EXTREMITIES:  No clubbing, cyanosis, calf tenderness or edema b/l      MEDICATIONS  (STANDING):  amitriptyline 50 milliGRAM(s) Oral at bedtime  amLODIPine   Tablet 5 milliGRAM(s) Oral daily  amphetamine/dextroamphetamine 10 milliGRAM(s) Oral <User Schedule>  baclofen 20 milliGRAM(s) Oral every 8 hours  dalfampridine ER 10 milliGRAM(s) Oral every 12 hours  enoxaparin Injectable 30 milliGRAM(s) SubCutaneous every 24 hours  escitalopram 5 milliGRAM(s) Oral daily  gabapentin 600 milliGRAM(s) Oral two times a day  hydrocortisone 1% Cream 1 Application(s) Topical two times a day  influenza   Vaccine 0.5 milliLiter(s) IntraMuscular once  lidocaine   4% Patch 1 Patch Transdermal daily  pantoprazole    Tablet 40 milliGRAM(s) Oral before breakfast  polyethylene glycol 3350 17 Gram(s) Oral daily  primidone 50 milliGRAM(s) Oral two times a day    MEDICATIONS  (PRN):  acetaminophen     Tablet .. 650 milliGRAM(s) Oral every 6 hours PRN Temp greater or equal to 38C (100.4F), Mild Pain (1 - 3)  aluminum hydroxide/magnesium hydroxide/simethicone Suspension 30 milliLiter(s) Oral every 4 hours PRN Dyspepsia  Halobetasol 0.05% 1 Application(s) 1 Application(s) Topical three times a day PRN psoriatic flare-ups  melatonin 6 milliGRAM(s) Oral at bedtime PRN Insomnia  ondansetron Injectable 4 milliGRAM(s) IV Push every 8 hours PRN Nausea and/or Vomiting  traMADol 25 milliGRAM(s) Oral every 6 hours PRN Moderate Pain (4 - 6)  traMADol 50 milliGRAM(s) Oral every 12 hours PRN Severe Pain (7 - 10)      Allergies    No Known Allergies    Intolerances              LABS:                 CAPILLARY BLOOD GLUCOSE                    RECENT CULTURES:          RADIOLOGY & ADDITIONAL TESTS:
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
## 2024-09-16 ENCOUNTER — TRANSCRIPTION ENCOUNTER (OUTPATIENT)
Age: 49
End: 2024-09-16

## 2024-09-16 DIAGNOSIS — F32.9 MAJOR DEPRESSIVE DISORDER, SINGLE EPISODE, UNSPECIFIED: ICD-10-CM

## 2024-09-16 DIAGNOSIS — L40.9 PSORIASIS, UNSPECIFIED: ICD-10-CM

## 2024-09-16 LAB
ALBUMIN SERPL ELPH-MCNC: 3.3 G/DL — SIGNIFICANT CHANGE UP (ref 3.3–5)
ALP SERPL-CCNC: 107 U/L — SIGNIFICANT CHANGE UP (ref 40–120)
ALT FLD-CCNC: 18 U/L — SIGNIFICANT CHANGE UP (ref 10–45)
ANION GAP SERPL CALC-SCNC: 6 MMOL/L — SIGNIFICANT CHANGE UP (ref 5–17)
AST SERPL-CCNC: 15 U/L — SIGNIFICANT CHANGE UP (ref 10–40)
BILIRUB SERPL-MCNC: 0.4 MG/DL — SIGNIFICANT CHANGE UP (ref 0.2–1.2)
BUN SERPL-MCNC: 16 MG/DL — SIGNIFICANT CHANGE UP (ref 7–23)
CALCIUM SERPL-MCNC: 9.3 MG/DL — SIGNIFICANT CHANGE UP (ref 8.4–10.5)
CHLORIDE SERPL-SCNC: 101 MMOL/L — SIGNIFICANT CHANGE UP (ref 96–108)
CO2 SERPL-SCNC: 31 MMOL/L — SIGNIFICANT CHANGE UP (ref 22–31)
CREAT SERPL-MCNC: 0.63 MG/DL — SIGNIFICANT CHANGE UP (ref 0.5–1.3)
EGFR: 108 ML/MIN/1.73M2 — SIGNIFICANT CHANGE UP
GLUCOSE SERPL-MCNC: 87 MG/DL — SIGNIFICANT CHANGE UP (ref 70–99)
HCT VFR BLD CALC: 37.7 % — SIGNIFICANT CHANGE UP (ref 34.5–45)
HGB BLD-MCNC: 12.9 G/DL — SIGNIFICANT CHANGE UP (ref 11.5–15.5)
MCHC RBC-ENTMCNC: 31.5 PG — SIGNIFICANT CHANGE UP (ref 27–34)
MCHC RBC-ENTMCNC: 34.2 GM/DL — SIGNIFICANT CHANGE UP (ref 32–36)
MCV RBC AUTO: 92 FL — SIGNIFICANT CHANGE UP (ref 80–100)
NRBC # BLD: 0 /100 WBCS — SIGNIFICANT CHANGE UP (ref 0–0)
PLATELET # BLD AUTO: 325 K/UL — SIGNIFICANT CHANGE UP (ref 150–400)
POTASSIUM SERPL-MCNC: 3.9 MMOL/L — SIGNIFICANT CHANGE UP (ref 3.5–5.3)
POTASSIUM SERPL-SCNC: 3.9 MMOL/L — SIGNIFICANT CHANGE UP (ref 3.5–5.3)
PROT SERPL-MCNC: 6.2 G/DL — SIGNIFICANT CHANGE UP (ref 6–8.3)
RBC # BLD: 4.1 M/UL — SIGNIFICANT CHANGE UP (ref 3.8–5.2)
RBC # FLD: 12 % — SIGNIFICANT CHANGE UP (ref 10.3–14.5)
SODIUM SERPL-SCNC: 138 MMOL/L — SIGNIFICANT CHANGE UP (ref 135–145)
WBC # BLD: 5.98 K/UL — SIGNIFICANT CHANGE UP (ref 3.8–10.5)
WBC # FLD AUTO: 5.98 K/UL — SIGNIFICANT CHANGE UP (ref 3.8–10.5)

## 2024-09-16 PROCEDURE — 99232 SBSQ HOSP IP/OBS MODERATE 35: CPT

## 2024-09-16 RX ORDER — TRAMADOL HYDROCHLORIDE 50 MG/1
50 TABLET, COATED ORAL EVERY 12 HOURS
Refills: 0 | Status: DISCONTINUED | OUTPATIENT
Start: 2024-09-16 | End: 2024-09-23

## 2024-09-16 RX ORDER — TRAMADOL HYDROCHLORIDE 50 MG/1
25 TABLET, COATED ORAL EVERY 6 HOURS
Refills: 0 | Status: DISCONTINUED | OUTPATIENT
Start: 2024-09-16 | End: 2024-09-23

## 2024-09-16 RX ORDER — DEXTROAMPHETAMINE SULFATE, DEXTROAMPHETAMINE SACCHARATE, AMPHETAMINE SULFATE AND AMPHETAMINE ASPARTATE 6.25; 6.25; 6.25; 6.25 MG/1; MG/1; MG/1; MG/1
10 CAPSULE, EXTENDED RELEASE ORAL
Refills: 0 | Status: DISCONTINUED | OUTPATIENT
Start: 2024-09-17 | End: 2024-09-23

## 2024-09-16 RX ADMIN — Medication 20 MILLIGRAM(S): at 07:07

## 2024-09-16 RX ADMIN — Medication 50 MILLIGRAM(S): at 21:09

## 2024-09-16 RX ADMIN — TRAMADOL HYDROCHLORIDE 50 MILLIGRAM(S): 50 TABLET, COATED ORAL at 23:00

## 2024-09-16 RX ADMIN — Medication 50 MILLIGRAM(S): at 18:05

## 2024-09-16 RX ADMIN — DALFAMPRIDINE 10 MILLIGRAM(S): 10 TABLET, FILM COATED, EXTENDED RELEASE ORAL at 08:18

## 2024-09-16 RX ADMIN — Medication 5 MILLIGRAM(S): at 07:06

## 2024-09-16 RX ADMIN — GABAPENTIN 600 MILLIGRAM(S): 800 TABLET, FILM COATED ORAL at 18:05

## 2024-09-16 RX ADMIN — DEXTROAMPHETAMINE SULFATE, DEXTROAMPHETAMINE SACCHARATE, AMPHETAMINE SULFATE AND AMPHETAMINE ASPARTATE 10 MILLIGRAM(S): 6.25; 6.25; 6.25; 6.25 CAPSULE, EXTENDED RELEASE ORAL at 07:07

## 2024-09-16 RX ADMIN — Medication 20 MILLIGRAM(S): at 13:50

## 2024-09-16 RX ADMIN — LIDOCAINE 1 PATCH: 50 CREAM TOPICAL at 21:11

## 2024-09-16 RX ADMIN — LIDOCAINE 1 PATCH: 50 CREAM TOPICAL at 04:49

## 2024-09-16 RX ADMIN — GABAPENTIN 600 MILLIGRAM(S): 800 TABLET, FILM COATED ORAL at 07:06

## 2024-09-16 RX ADMIN — LIDOCAINE 1 PATCH: 50 CREAM TOPICAL at 12:15

## 2024-09-16 RX ADMIN — Medication 20 MILLIGRAM(S): at 21:09

## 2024-09-16 RX ADMIN — DEXTROAMPHETAMINE SULFATE, DEXTROAMPHETAMINE SACCHARATE, AMPHETAMINE SULFATE AND AMPHETAMINE ASPARTATE 10 MILLIGRAM(S): 6.25; 6.25; 6.25; 6.25 CAPSULE, EXTENDED RELEASE ORAL at 12:14

## 2024-09-16 RX ADMIN — TRAMADOL HYDROCHLORIDE 50 MILLIGRAM(S): 50 TABLET, COATED ORAL at 22:10

## 2024-09-16 RX ADMIN — PANTOPRAZOLE SODIUM 40 MILLIGRAM(S): 40 TABLET, DELAYED RELEASE ORAL at 07:06

## 2024-09-16 RX ADMIN — Medication 6 MILLIGRAM(S): at 22:10

## 2024-09-16 RX ADMIN — Medication 5 MILLIGRAM(S): at 12:15

## 2024-09-16 RX ADMIN — DALFAMPRIDINE 10 MILLIGRAM(S): 10 TABLET, FILM COATED, EXTENDED RELEASE ORAL at 21:09

## 2024-09-16 RX ADMIN — Medication 17 GRAM(S): at 12:15

## 2024-09-16 RX ADMIN — Medication 50 MILLIGRAM(S): at 07:06

## 2024-09-16 RX ADMIN — ENOXAPARIN SODIUM 30 MILLIGRAM(S): 150 INJECTION SUBCUTANEOUS at 07:07

## 2024-09-16 NOTE — DISCHARGE NOTE PROVIDER - CARE PROVIDER_API CALL
Lynda Wu  Family Medicine  [address] [place] [address], [address]  [place] [address]  Phone: [phone]  Fax: [phone]  Follow Up Time: 2 weeks

## 2024-09-16 NOTE — PROGRESS NOTE ADULT - SUBJECTIVE AND OBJECTIVE BOX
Patient is a 49y old  Female who presents with a chief complaint of MS exacerbation (15 Sep 2024 11:41)      SUBJECTIVE / OVERNIGHT EVENTS:  Pt seen and examined at bedside. No acute events overnight. Complaints of itching at multiple areas due to her psoriasis   Pt denies cp, palpitations, sob, abd pain, N/V, fever, chills.    ROS:  All other review of systems negative    Allergies    No Known Allergies    Intolerances        MEDICATIONS  (STANDING):  amitriptyline 50 milliGRAM(s) Oral at bedtime  amLODIPine   Tablet 5 milliGRAM(s) Oral daily  amphetamine/dextroamphetamine 10 milliGRAM(s) Oral <User Schedule>  baclofen 20 milliGRAM(s) Oral every 8 hours  dalfampridine ER 10 milliGRAM(s) Oral every 12 hours  enoxaparin Injectable 30 milliGRAM(s) SubCutaneous every 24 hours  escitalopram 5 milliGRAM(s) Oral daily  gabapentin 600 milliGRAM(s) Oral two times a day  hydrocortisone 1% Cream 1 Application(s) Topical two times a day  influenza   Vaccine 0.5 milliLiter(s) IntraMuscular once  lidocaine   4% Patch 1 Patch Transdermal daily  pantoprazole    Tablet 40 milliGRAM(s) Oral before breakfast  polyethylene glycol 3350 17 Gram(s) Oral daily  primidone 50 milliGRAM(s) Oral two times a day    MEDICATIONS  (PRN):  acetaminophen     Tablet .. 650 milliGRAM(s) Oral every 6 hours PRN Temp greater or equal to 38C (100.4F), Mild Pain (1 - 3)  aluminum hydroxide/magnesium hydroxide/simethicone Suspension 30 milliLiter(s) Oral every 4 hours PRN Dyspepsia  bisacodyl Suppository 10 milliGRAM(s) Rectal daily PRN Constipation  Halobetasol 0.05% 1 Application(s) 1 Application(s) Topical three times a day PRN psoriatic flare-ups  melatonin 6 milliGRAM(s) Oral at bedtime PRN Insomnia  ondansetron Injectable 4 milliGRAM(s) IV Push every 8 hours PRN Nausea and/or Vomiting  traMADol 25 milliGRAM(s) Oral every 6 hours PRN Moderate Pain (4 - 6)  traMADol 50 milliGRAM(s) Oral every 12 hours PRN Severe Pain (7 - 10)      Vital Signs Last 24 Hrs  T(C): 36.5 (16 Sep 2024 08:26), Max: 36.7 (15 Sep 2024 13:00)  T(F): 97.7 (16 Sep 2024 08:26), Max: 98 (15 Sep 2024 13:00)  HR: 97 (16 Sep 2024 08:26) (93 - 104)  BP: 118/78 (16 Sep 2024 08:26) (114/73 - 139/86)  BP(mean): --  RR: 16 (16 Sep 2024 08:26) (16 - 16)  SpO2: 98% (16 Sep 2024 08:26) (97% - 98%)    Parameters below as of 16 Sep 2024 08:26  Patient On (Oxygen Delivery Method): room air      CAPILLARY BLOOD GLUCOSE        I&O's Summary      PHYSICAL EXAM:  GENERAL: NAD, thin AAOx3 female   HEAD:  Atraumatic, Normocephalic  NECK: Supple, No JVD  CHEST/LUNG: Clear to auscultation bilaterally; No wheeze, nonlabored breathing  HEART: Regular rate and rhythm; No murmurs, rubs, or gallops  ABDOMEN: Soft, Nontender, Nondistended; Bowel sounds present  EXTREMITIES: No clubbing, cyanosis, or edema  PSYCH: calm, appropriate mood  SKIN: Multiple areas of chronic plaque psoriasis     LABS:                        12.9   5.98  )-----------( 325      ( 16 Sep 2024 05:20 )             37.7     09-16    138  |  101  |  16  ----------------------------<  87  3.9   |  31  |  0.63    Ca    9.3      16 Sep 2024 05:20    TPro  6.2  /  Alb  3.3  /  TBili  0.4  /  DBili  x   /  AST  15  /  ALT  18  /  AlkPhos  107  09-16          Urinalysis Basic - ( 16 Sep 2024 05:20 )    Color: x / Appearance: x / SG: x / pH: x  Gluc: 87 mg/dL / Ketone: x  / Bili: x / Urobili: x   Blood: x / Protein: x / Nitrite: x   Leuk Esterase: x / RBC: x / WBC x   Sq Epi: x / Non Sq Epi: x / Bacteria: x        RADIOLOGY & ADDITIONAL TESTS:  Results Reviewed:   Imaging Personally Reviewed:  Electrocardiogram Personally Reviewed:    COORDINATION OF CARE:  Care Discussed with Consultants/Other Providers [Y/N]:  Prior or Outpatient Records Reviewed [Y/N]:
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
## 2024-09-16 NOTE — DISCHARGE NOTE PROVIDER - DETAILS OF MALNUTRITION DIAGNOSIS/DIAGNOSES
This patient has been assessed with a concern for Malnutrition and was treated during this hospitalization for the following Nutrition diagnosis/diagnoses:     -  09/12/2024: Moderate protein-calorie malnutrition

## 2024-09-16 NOTE — CHART NOTE - NSCHARTNOTEFT_GEN_A_CORE
Nutrition Follow Up Note  Hospital Course   (Per Electronic Medical Record)    Source:  Patient [X]  Medical Record [X]      Diet:   Regular Diet (IDDSI Level 7) w/ Thin Liquids (IDDSI Level 0)  Tolerates Diet Consistency Well  No Chewing/Swallowing Difficulties  No Recent Nausea, Vomiting, Diarrhea or Constipation (as Per Patient)   Consumes % of Meals (as Per Documentation) - States Good PO Intake/Appetite (Per Patient)   Declines Nutrition Supplementation     Enteral/Parenteral Nutrition: Not Applicable    Current Weight: 104.7lb on 9/13  Obtain New Weight  Obtain Weights Weekly     Pertinent Medications: MEDICATIONS  (STANDING):  amitriptyline 50 milliGRAM(s) Oral at bedtime  amLODIPine   Tablet 5 milliGRAM(s) Oral daily  amphetamine/dextroamphetamine 10 milliGRAM(s) Oral <User Schedule>  baclofen 20 milliGRAM(s) Oral every 8 hours  dalfampridine ER 10 milliGRAM(s) Oral every 12 hours  enoxaparin Injectable 30 milliGRAM(s) SubCutaneous every 24 hours  escitalopram 5 milliGRAM(s) Oral daily  gabapentin 600 milliGRAM(s) Oral two times a day  Halobetasol 0.05% 1 Application(s),Halobetasol 0.05% 1 Application(s) 1 Application(s) Topical three times a day  hydrocortisone 1% Cream 1 Application(s) Topical two times a day  influenza   Vaccine 0.5 milliLiter(s) IntraMuscular once  lidocaine   4% Patch 1 Patch Transdermal daily  pantoprazole    Tablet 40 milliGRAM(s) Oral before breakfast  polyethylene glycol 3350 17 Gram(s) Oral daily  primidone 50 milliGRAM(s) Oral two times a day    MEDICATIONS  (PRN):  acetaminophen     Tablet .. 650 milliGRAM(s) Oral every 6 hours PRN Temp greater or equal to 38C (100.4F), Mild Pain (1 - 3)  aluminum hydroxide/magnesium hydroxide/simethicone Suspension 30 milliLiter(s) Oral every 4 hours PRN Dyspepsia  bisacodyl Suppository 10 milliGRAM(s) Rectal daily PRN Constipation  melatonin 6 milliGRAM(s) Oral at bedtime PRN Insomnia  ondansetron Injectable 4 milliGRAM(s) IV Push every 8 hours PRN Nausea and/or Vomiting  traMADol 25 milliGRAM(s) Oral every 6 hours PRN Moderate Pain (4 - 6)  traMADol 50 milliGRAM(s) Oral every 12 hours PRN Severe Pain (7 - 10)    Pertinent Labs:  09-16 Na138 mmol/L Glu 87 mg/dL K+ 3.9 mmol/L Cr  0.63 mg/dL BUN 16 mg/dL 09-16 Alb 3.3 g/dL    Skin: No Pressure Ulcers     Edema: None Noted (as Per Documentation)     Last Bowel Movement: on 9/14    Estimated Needs:   [X] No Change Since Previous Assessment    Previous Nutrition Diagnosis:   Moderate Malnutrition     Nutrition Diagnosis is [X] Ongoing - Declines Nutrition Supplementation     New Nutrition Diagnosis: [X] Not Applicable    Interventions:   1. Recommend Continue Nutrition Plan of Care     Monitoring & Evaluation:   [X] Weights   [X] PO Intake   [X] Skin Integrity   [X] Follow Up (Per Protocol)  [X] Tolerance to Diet Prescription   [X] Other: Labs     Registered Dietitian/Nutritionist Remains Available.  Satish Garcias RDN, CDN    Phone# [phone]

## 2024-09-16 NOTE — DISCHARGE NOTE PROVIDER - NSDCFUSCHEDAPPT_GEN_ALL_CORE_FT
Lucas Hope  [facility] [address]  Scheduled Appointment: 09/23/2024    Tamara Tomlin  [facility]  NEUROLOGY [address] [facility]  Scheduled Appointment: 09/24/2024    Benoit Anna  [facility] [address]  Scheduled Appointment: 09/26/2024     Benoit Anna  [address]  Scheduled Appointment: 09/26/2024    Lucas Hope  [address]  Scheduled Appointment: 10/07/2024
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
## 2024-09-16 NOTE — DISCHARGE NOTE PROVIDER - NSDCMRMEDTOKEN_GEN_ALL_CORE_FT
acetaminophen 325 mg oral tablet: 2 tab(s) orally every 6 hours As needed Temp greater or equal to 38C (100.4F), Mild Pain (1 - 3)  amitriptyline 25 mg oral tablet: 1 tab(s) orally once a day  amLODIPine 5 mg oral tablet: 1 tab(s) orally once a day  baclofen 20 mg oral tablet: 1 tab(s) orally every 8 hours As needed Muscle Spasm  dalfampridine 10 mg oral tablet, extended release: 1 tab(s) orally 2 times a day  dextroamphetamine-amphetamine 10 mg oral tablet: 1 tab(s) orally once a day  gabapentin 300 mg oral capsule: 2 cap(s) orally 2 times a day  lidocaine 4% topical film: Apply topically to affected area once a day  ocrevus:   primidone 50 mg oral tablet: 1 tab(s) orally 2 times a day  rollator: rollator  traMADol 50 mg oral tablet: 0.5 tab(s) orally every 6 hours As needed Moderate Pain (4 - 6)  traMADol 50 mg oral tablet: 1 tab(s) orally every 12 hours As needed Severe Pain (7 - 10)   acetaminophen 325 mg oral tablet: 2 tab(s) orally every 6 hours As needed Temp greater or equal to 38C (100.4F), Mild Pain (1 - 3)  amitriptyline 50 mg oral tablet: 1 tab(s) orally once a day (at bedtime)  amLODIPine 5 mg oral tablet: 1 tab(s) orally once a day  baclofen 20 mg oral tablet: 1 tab(s) orally every 8 hours MDD: 60mg  dalfampridine 10 mg oral tablet, extended release: 1 tab(s) orally every 12 hours MDD: 20mg  dextroamphetamine-amphetamine 10 mg oral tablet: 1 tab(s) orally once a day MDD: 10mg  Dulcolax Laxative 10 mg rectal suppository: 1 suppository(ies) rectal once a day As needed Constipation  gabapentin 300 mg oral capsule: 2 cap(s) orally 2 times a day  hydrocortisone 1% topical cream: 1 Apply topically to affected area 2 times a day  Lexapro 10 mg oral tablet: 1 tab(s) orally once a day  lidocaine 4% topical film: Apply topically to affected area once a day  ocrevus:   pantoprazole 40 mg oral delayed release tablet: 1 tab(s) orally once a day (before a meal) MDD: 40mg  polyethylene glycol 3350 oral powder for reconstitution: 17 gram(s) orally once a day  primidone 50 mg oral tablet: 1 tab(s) orally 2 times a day  rollator: rollator  senna leaf extract oral tablet: 2 tab(s) orally once a day (at bedtime)  traMADol 50 mg oral tablet: 1 tab(s) orally every 6 hours as needed for  severe pain MDD: 200mg

## 2024-09-16 NOTE — DISCHARGE NOTE PROVIDER - NSFOLLOWUPCLINICS_GEN_ALL_ED_FT
[facility] Rheumatology  Rheumatology  [address] [address]  Phone: [phone]  Fax:   Follow Up Time: 1 week     [facility] Rheumatology  Rheumatology  [address] [facility] [address]  [place] [address]  Phone: [phone]  Fax:   Follow Up Time: 1 week    [facility]  [place] [address]  Phone: [phone]  Fax:   Follow Up Time: 2 weeks

## 2024-09-17 PROCEDURE — 99232 SBSQ HOSP IP/OBS MODERATE 35: CPT

## 2024-09-17 PROCEDURE — 90832 PSYTX W PT 30 MINUTES: CPT

## 2024-09-17 RX ADMIN — LIDOCAINE 1 PATCH: 50 CREAM TOPICAL at 12:36

## 2024-09-17 RX ADMIN — LIDOCAINE 1 PATCH: 50 CREAM TOPICAL at 19:36

## 2024-09-17 RX ADMIN — Medication 50 MILLIGRAM(S): at 18:35

## 2024-09-17 RX ADMIN — DEXTROAMPHETAMINE SULFATE, DEXTROAMPHETAMINE SACCHARATE, AMPHETAMINE SULFATE AND AMPHETAMINE ASPARTATE 10 MILLIGRAM(S): 6.25; 6.25; 6.25; 6.25 CAPSULE, EXTENDED RELEASE ORAL at 08:45

## 2024-09-17 RX ADMIN — Medication 5 MILLIGRAM(S): at 05:56

## 2024-09-17 RX ADMIN — Medication 20 MILLIGRAM(S): at 05:56

## 2024-09-17 RX ADMIN — GABAPENTIN 600 MILLIGRAM(S): 800 TABLET, FILM COATED ORAL at 06:06

## 2024-09-17 RX ADMIN — Medication 50 MILLIGRAM(S): at 05:56

## 2024-09-17 RX ADMIN — DALFAMPRIDINE 10 MILLIGRAM(S): 10 TABLET, FILM COATED, EXTENDED RELEASE ORAL at 08:45

## 2024-09-17 RX ADMIN — Medication 5 MILLIGRAM(S): at 12:37

## 2024-09-17 RX ADMIN — Medication 20 MILLIGRAM(S): at 21:57

## 2024-09-17 RX ADMIN — Medication 6 MILLIGRAM(S): at 21:57

## 2024-09-17 RX ADMIN — LIDOCAINE 1 PATCH: 50 CREAM TOPICAL at 01:00

## 2024-09-17 RX ADMIN — GABAPENTIN 600 MILLIGRAM(S): 800 TABLET, FILM COATED ORAL at 18:35

## 2024-09-17 RX ADMIN — ENOXAPARIN SODIUM 30 MILLIGRAM(S): 150 INJECTION SUBCUTANEOUS at 05:57

## 2024-09-17 RX ADMIN — Medication 20 MILLIGRAM(S): at 13:00

## 2024-09-17 RX ADMIN — ANTI-ITCH CREAM 1 APPLICATION(S): 1 OINTMENT TOPICAL at 06:06

## 2024-09-17 RX ADMIN — PANTOPRAZOLE SODIUM 40 MILLIGRAM(S): 40 TABLET, DELAYED RELEASE ORAL at 06:06

## 2024-09-17 RX ADMIN — TRAMADOL HYDROCHLORIDE 50 MILLIGRAM(S): 50 TABLET, COATED ORAL at 21:56

## 2024-09-17 RX ADMIN — DALFAMPRIDINE 10 MILLIGRAM(S): 10 TABLET, FILM COATED, EXTENDED RELEASE ORAL at 21:58

## 2024-09-17 RX ADMIN — Medication 50 MILLIGRAM(S): at 21:57
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
## 2024-09-17 NOTE — PROGRESS NOTE ADULT - SUBJECTIVE AND OBJECTIVE BOX
Patient was seen for 20 minute supportive therapy session.     Patient indicates she is eager to participate in as much physical rehabilitation as possible. She discusses being seen by psychiatry over the weekend and started on Lexapro. She states, "I am feeling better. I'm not crying anymore."
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
## 2024-09-17 NOTE — PROGRESS NOTE ADULT - SUBJECTIVE AND OBJECTIVE BOX
Patient is a 49y old  Female who presents with a chief complaint of MS exacerbation (17 Sep 2024 10:50)      Subjective and overnight events:  Patient seen and examined at bedside. no new complaints. no fever, chills, sob, cp, abd pain. no headache, dizziness. + BM    ALLERGIES:  No Known Allergies    MEDICATIONS  (STANDING):  amitriptyline 50 milliGRAM(s) Oral at bedtime  amLODIPine   Tablet 5 milliGRAM(s) Oral daily  amphetamine/dextroamphetamine 10 milliGRAM(s) Oral <User Schedule>  baclofen 20 milliGRAM(s) Oral every 8 hours  dalfampridine ER 10 milliGRAM(s) Oral every 12 hours  enoxaparin Injectable 30 milliGRAM(s) SubCutaneous every 24 hours  escitalopram 5 milliGRAM(s) Oral daily  gabapentin 600 milliGRAM(s) Oral two times a day  Halobetasol 0.05% 1 Application(s),Halobetasol 0.05% 1 Application(s) 1 Application(s) Topical three times a day  hydrocortisone 1% Cream 1 Application(s) Topical two times a day  influenza   Vaccine 0.5 milliLiter(s) IntraMuscular once  lidocaine   4% Patch 1 Patch Transdermal daily  pantoprazole    Tablet 40 milliGRAM(s) Oral before breakfast  polyethylene glycol 3350 17 Gram(s) Oral daily  primidone 50 milliGRAM(s) Oral two times a day    MEDICATIONS  (PRN):  acetaminophen     Tablet .. 650 milliGRAM(s) Oral every 6 hours PRN Temp greater or equal to 38C (100.4F), Mild Pain (1 - 3)  aluminum hydroxide/magnesium hydroxide/simethicone Suspension 30 milliLiter(s) Oral every 4 hours PRN Dyspepsia  bisacodyl Suppository 10 milliGRAM(s) Rectal daily PRN Constipation  melatonin 6 milliGRAM(s) Oral at bedtime PRN Insomnia  ondansetron Injectable 4 milliGRAM(s) IV Push every 8 hours PRN Nausea and/or Vomiting  traMADol 50 milliGRAM(s) Oral every 12 hours PRN Severe Pain (7 - 10)  traMADol 25 milliGRAM(s) Oral every 6 hours PRN Moderate Pain (4 - 6)    Vital Signs Last 24 Hrs  T(F): 97.7 (17 Sep 2024 08:38), Max: 97.7 (17 Sep 2024 08:38)  HR: 92 (17 Sep 2024 08:38) (84 - 98)  BP: 110/71 (17 Sep 2024 08:38) (110/71 - 122/76)  RR: 16 (17 Sep 2024 08:38) (16 - 18)  SpO2: 98% (17 Sep 2024 08:38) (98% - 98%)  I&O's Summary    PHYSICAL EXAM:  General: NAD, A/O x 3  ENT: MMM  Neck: Supple, No JVD  Lungs: Clear to auscultation bilaterally  Cardio: RRR, S1/S2, No murmurs  Abdomen: Soft, Nontender, Nondistended; Bowel sounds present  Extremities: No calf tenderness, No pitting edema    LABS:                        12.9   5.98  )-----------( 325      ( 16 Sep 2024 05:20 )             37.7     09-16    138  |  101  |  16  ----------------------------<  87  3.9   |  31  |  0.63    Ca    9.3      16 Sep 2024 05:20    TPro  6.2  /  Alb  3.3  /  TBili  0.4  /  DBili  x   /  AST  15  /  ALT  18  /  AlkPhos  107  09-16        Urinalysis Basic - ( 16 Sep 2024 05:20 )    Color: x / Appearance: x / SG: x / pH: x  Gluc: 87 mg/dL / Ketone: x  / Bili: x / Urobili: x   Blood: x / Protein: x / Nitrite: x   Leuk Esterase: x / RBC: x / WBC x   Sq Epi: x / Non Sq Epi: x / Bacteria: x        COVID-19 PCR: NotDetec (09-11-24 @ 19:24)      RADIOLOGY & ADDITIONAL TESTS:    Care Discussed with Consultants/Other Providers:

## 2024-09-17 NOTE — PROGRESS NOTE ADULT - ASSESSMENT
She is alert and attentive, oriented x4. Speech is fluent, comprehensible, less pressured than in the prior session. Mood less anxious, affect normal. Hallucinations or delusional thoughts are not elicited.     Themes discussed include coping with chronic health concerns, change in functioning, consideration of community support organizations for MS, mindset and interaction between thoughts and feelings. Support and encouragement are provided. She expresses appreciation for session.     Plan: Follow-up supportive therapy to address mood/anxiety. Neuropsychology remains available through discharge.

## 2024-09-18 PROCEDURE — 99232 SBSQ HOSP IP/OBS MODERATE 35: CPT

## 2024-09-18 RX ADMIN — TRAMADOL HYDROCHLORIDE 50 MILLIGRAM(S): 50 TABLET, COATED ORAL at 21:30

## 2024-09-18 RX ADMIN — LIDOCAINE 1 PATCH: 50 CREAM TOPICAL at 13:20

## 2024-09-18 RX ADMIN — DEXTROAMPHETAMINE SULFATE, DEXTROAMPHETAMINE SACCHARATE, AMPHETAMINE SULFATE AND AMPHETAMINE ASPARTATE 10 MILLIGRAM(S): 6.25; 6.25; 6.25; 6.25 CAPSULE, EXTENDED RELEASE ORAL at 13:22

## 2024-09-18 RX ADMIN — DALFAMPRIDINE 10 MILLIGRAM(S): 10 TABLET, FILM COATED, EXTENDED RELEASE ORAL at 08:14

## 2024-09-18 RX ADMIN — Medication 50 MILLIGRAM(S): at 21:30

## 2024-09-18 RX ADMIN — DEXTROAMPHETAMINE SULFATE, DEXTROAMPHETAMINE SACCHARATE, AMPHETAMINE SULFATE AND AMPHETAMINE ASPARTATE 10 MILLIGRAM(S): 6.25; 6.25; 6.25; 6.25 CAPSULE, EXTENDED RELEASE ORAL at 08:15

## 2024-09-18 RX ADMIN — Medication 6 MILLIGRAM(S): at 21:30

## 2024-09-18 RX ADMIN — TRAMADOL HYDROCHLORIDE 50 MILLIGRAM(S): 50 TABLET, COATED ORAL at 22:30

## 2024-09-18 RX ADMIN — GABAPENTIN 600 MILLIGRAM(S): 800 TABLET, FILM COATED ORAL at 06:29

## 2024-09-18 RX ADMIN — Medication 20 MILLIGRAM(S): at 13:22

## 2024-09-18 RX ADMIN — DALFAMPRIDINE 10 MILLIGRAM(S): 10 TABLET, FILM COATED, EXTENDED RELEASE ORAL at 19:52

## 2024-09-18 RX ADMIN — Medication 5 MILLIGRAM(S): at 06:29

## 2024-09-18 RX ADMIN — LIDOCAINE 1 PATCH: 50 CREAM TOPICAL at 00:37

## 2024-09-18 RX ADMIN — ANTI-ITCH CREAM 1 APPLICATION(S): 1 OINTMENT TOPICAL at 06:34

## 2024-09-18 RX ADMIN — Medication 50 MILLIGRAM(S): at 06:30

## 2024-09-18 RX ADMIN — Medication 5 MILLIGRAM(S): at 13:46

## 2024-09-18 RX ADMIN — GABAPENTIN 600 MILLIGRAM(S): 800 TABLET, FILM COATED ORAL at 17:51

## 2024-09-18 RX ADMIN — Medication 20 MILLIGRAM(S): at 06:30

## 2024-09-18 RX ADMIN — ANTI-ITCH CREAM 1 APPLICATION(S): 1 OINTMENT TOPICAL at 17:52

## 2024-09-18 RX ADMIN — Medication 20 MILLIGRAM(S): at 21:30

## 2024-09-18 RX ADMIN — PANTOPRAZOLE SODIUM 40 MILLIGRAM(S): 40 TABLET, DELAYED RELEASE ORAL at 06:30

## 2024-09-18 RX ADMIN — Medication 50 MILLIGRAM(S): at 17:51

## 2024-09-18 RX ADMIN — LIDOCAINE 1 PATCH: 50 CREAM TOPICAL at 19:44

## 2024-09-18 RX ADMIN — ENOXAPARIN SODIUM 30 MILLIGRAM(S): 150 INJECTION SUBCUTANEOUS at 06:30
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
## 2024-09-19 LAB
ALBUMIN SERPL ELPH-MCNC: 3.2 G/DL — LOW (ref 3.3–5)
ALP SERPL-CCNC: 97 U/L — SIGNIFICANT CHANGE UP (ref 40–120)
ALT FLD-CCNC: 18 U/L — SIGNIFICANT CHANGE UP (ref 10–45)
ANION GAP SERPL CALC-SCNC: 5 MMOL/L — SIGNIFICANT CHANGE UP (ref 5–17)
AST SERPL-CCNC: 13 U/L — SIGNIFICANT CHANGE UP (ref 10–40)
BILIRUB SERPL-MCNC: 0.2 MG/DL — SIGNIFICANT CHANGE UP (ref 0.2–1.2)
BUN SERPL-MCNC: 15 MG/DL — SIGNIFICANT CHANGE UP (ref 7–23)
CALCIUM SERPL-MCNC: 9.1 MG/DL — SIGNIFICANT CHANGE UP (ref 8.4–10.5)
CHLORIDE SERPL-SCNC: 102 MMOL/L — SIGNIFICANT CHANGE UP (ref 96–108)
CO2 SERPL-SCNC: 32 MMOL/L — HIGH (ref 22–31)
CREAT SERPL-MCNC: 0.67 MG/DL — SIGNIFICANT CHANGE UP (ref 0.5–1.3)
EGFR: 106 ML/MIN/1.73M2 — SIGNIFICANT CHANGE UP
GLUCOSE SERPL-MCNC: 82 MG/DL — SIGNIFICANT CHANGE UP (ref 70–99)
HCT VFR BLD CALC: 37 % — SIGNIFICANT CHANGE UP (ref 34.5–45)
HGB BLD-MCNC: 12.2 G/DL — SIGNIFICANT CHANGE UP (ref 11.5–15.5)
MCHC RBC-ENTMCNC: 31.1 PG — SIGNIFICANT CHANGE UP (ref 27–34)
MCHC RBC-ENTMCNC: 33 GM/DL — SIGNIFICANT CHANGE UP (ref 32–36)
MCV RBC AUTO: 94.4 FL — SIGNIFICANT CHANGE UP (ref 80–100)
NRBC # BLD: 0 /100 WBCS — SIGNIFICANT CHANGE UP (ref 0–0)
PLATELET # BLD AUTO: 313 K/UL — SIGNIFICANT CHANGE UP (ref 150–400)
POTASSIUM SERPL-MCNC: 4.2 MMOL/L — SIGNIFICANT CHANGE UP (ref 3.5–5.3)
POTASSIUM SERPL-SCNC: 4.2 MMOL/L — SIGNIFICANT CHANGE UP (ref 3.5–5.3)
PROT SERPL-MCNC: 6.1 G/DL — SIGNIFICANT CHANGE UP (ref 6–8.3)
RBC # BLD: 3.92 M/UL — SIGNIFICANT CHANGE UP (ref 3.8–5.2)
RBC # FLD: 12.1 % — SIGNIFICANT CHANGE UP (ref 10.3–14.5)
SODIUM SERPL-SCNC: 139 MMOL/L — SIGNIFICANT CHANGE UP (ref 135–145)
WBC # BLD: 6.46 K/UL — SIGNIFICANT CHANGE UP (ref 3.8–10.5)
WBC # FLD AUTO: 6.46 K/UL — SIGNIFICANT CHANGE UP (ref 3.8–10.5)

## 2024-09-19 PROCEDURE — 99232 SBSQ HOSP IP/OBS MODERATE 35: CPT

## 2024-09-19 RX ADMIN — DALFAMPRIDINE 10 MILLIGRAM(S): 10 TABLET, FILM COATED, EXTENDED RELEASE ORAL at 08:39

## 2024-09-19 RX ADMIN — ENOXAPARIN SODIUM 30 MILLIGRAM(S): 150 INJECTION SUBCUTANEOUS at 05:23

## 2024-09-19 RX ADMIN — DEXTROAMPHETAMINE SULFATE, DEXTROAMPHETAMINE SACCHARATE, AMPHETAMINE SULFATE AND AMPHETAMINE ASPARTATE 10 MILLIGRAM(S): 6.25; 6.25; 6.25; 6.25 CAPSULE, EXTENDED RELEASE ORAL at 08:39

## 2024-09-19 RX ADMIN — Medication 5 MILLIGRAM(S): at 12:17

## 2024-09-19 RX ADMIN — Medication 50 MILLIGRAM(S): at 17:31

## 2024-09-19 RX ADMIN — Medication 50 MILLIGRAM(S): at 05:25

## 2024-09-19 RX ADMIN — ANTI-ITCH CREAM 1 APPLICATION(S): 1 OINTMENT TOPICAL at 05:27

## 2024-09-19 RX ADMIN — TRAMADOL HYDROCHLORIDE 50 MILLIGRAM(S): 50 TABLET, COATED ORAL at 21:22

## 2024-09-19 RX ADMIN — Medication 50 MILLIGRAM(S): at 21:22

## 2024-09-19 RX ADMIN — DALFAMPRIDINE 10 MILLIGRAM(S): 10 TABLET, FILM COATED, EXTENDED RELEASE ORAL at 19:49

## 2024-09-19 RX ADMIN — GABAPENTIN 600 MILLIGRAM(S): 800 TABLET, FILM COATED ORAL at 17:31

## 2024-09-19 RX ADMIN — PANTOPRAZOLE SODIUM 40 MILLIGRAM(S): 40 TABLET, DELAYED RELEASE ORAL at 05:25

## 2024-09-19 RX ADMIN — LIDOCAINE 1 PATCH: 50 CREAM TOPICAL at 12:16

## 2024-09-19 RX ADMIN — Medication 5 MILLIGRAM(S): at 06:07

## 2024-09-19 RX ADMIN — LIDOCAINE 1 PATCH: 50 CREAM TOPICAL at 02:00

## 2024-09-19 RX ADMIN — LIDOCAINE 1 PATCH: 50 CREAM TOPICAL at 19:19

## 2024-09-19 RX ADMIN — Medication 6 MILLIGRAM(S): at 21:22

## 2024-09-19 RX ADMIN — Medication 20 MILLIGRAM(S): at 05:25

## 2024-09-19 RX ADMIN — DEXTROAMPHETAMINE SULFATE, DEXTROAMPHETAMINE SACCHARATE, AMPHETAMINE SULFATE AND AMPHETAMINE ASPARTATE 10 MILLIGRAM(S): 6.25; 6.25; 6.25; 6.25 CAPSULE, EXTENDED RELEASE ORAL at 12:16

## 2024-09-19 RX ADMIN — Medication 20 MILLIGRAM(S): at 14:46

## 2024-09-19 RX ADMIN — GABAPENTIN 600 MILLIGRAM(S): 800 TABLET, FILM COATED ORAL at 05:25

## 2024-09-19 RX ADMIN — ANTI-ITCH CREAM 1 APPLICATION(S): 1 OINTMENT TOPICAL at 17:32

## 2024-09-19 RX ADMIN — Medication 20 MILLIGRAM(S): at 21:22

## 2024-09-19 NOTE — PROGRESS NOTE ADULT - SUBJECTIVE AND OBJECTIVE BOX
Patient is a 49y old  Female who presents with a chief complaint of MS exacerbation (19 Sep 2024 12:48)      Subjective and overnight events:  Patient seen and examined at bedside. no new complaints. no fever, chills, sob, cp, abd pain. low BP 93/57 at 5am this morning, pt was in bed, asymptomatic.    ALLERGIES:  No Known Allergies    MEDICATIONS  (STANDING):  amitriptyline 50 milliGRAM(s) Oral at bedtime  amLODIPine   Tablet 5 milliGRAM(s) Oral daily  amphetamine/dextroamphetamine 10 milliGRAM(s) Oral <User Schedule>  baclofen 20 milliGRAM(s) Oral every 8 hours  dalfampridine ER 10 milliGRAM(s) Oral every 12 hours  enoxaparin Injectable 30 milliGRAM(s) SubCutaneous every 24 hours  escitalopram 5 milliGRAM(s) Oral daily  gabapentin 600 milliGRAM(s) Oral two times a day  Halobetasol 0.05% 1 Application(s),Halobetasol 0.05% 1 Application(s) 1 Application(s) Topical three times a day  hydrocortisone 1% Cream 1 Application(s) Topical two times a day  influenza   Vaccine 0.5 milliLiter(s) IntraMuscular once  lidocaine   4% Patch 1 Patch Transdermal daily  pantoprazole    Tablet 40 milliGRAM(s) Oral before breakfast  polyethylene glycol 3350 17 Gram(s) Oral daily  primidone 50 milliGRAM(s) Oral two times a day    MEDICATIONS  (PRN):  acetaminophen     Tablet .. 650 milliGRAM(s) Oral every 6 hours PRN Temp greater or equal to 38C (100.4F), Mild Pain (1 - 3)  aluminum hydroxide/magnesium hydroxide/simethicone Suspension 30 milliLiter(s) Oral every 4 hours PRN Dyspepsia  bisacodyl Suppository 10 milliGRAM(s) Rectal daily PRN Constipation  melatonin 6 milliGRAM(s) Oral at bedtime PRN Insomnia  ondansetron Injectable 4 milliGRAM(s) IV Push every 8 hours PRN Nausea and/or Vomiting  traMADol 25 milliGRAM(s) Oral every 6 hours PRN Moderate Pain (4 - 6)  traMADol 50 milliGRAM(s) Oral every 12 hours PRN Severe Pain (7 - 10)    Vital Signs Last 24 Hrs  T(F): 97.9 (19 Sep 2024 08:37), Max: 98 (18 Sep 2024 19:58)  HR: 86 (19 Sep 2024 08:37) (80 - 89)  BP: 134/85 (19 Sep 2024 08:37) (93/57 - 134/85)  RR: 16 (19 Sep 2024 08:37) (16 - 16)  SpO2: 99% (19 Sep 2024 08:37) (98% - 99%)  I&O's Summary    PHYSICAL EXAM:  General: NAD, A/O x 3  ENT: MMM  Neck: Supple, No JVD  Lungs: Clear to auscultation bilaterally  Cardio: RRR, S1/S2, No murmurs  Abdomen: Soft, Nontender, Nondistended; Bowel sounds present  Extremities: No calf tenderness, No pitting edema    LABS:                        12.2   6.46  )-----------( 313      ( 19 Sep 2024 05:38 )             37.0     09-19    139  |  102  |  15  ----------------------------<  82  4.2   |  32  |  0.67    Ca    9.1      19 Sep 2024 05:38    TPro  6.1  /  Alb  3.2  /  TBili  0.2  /  DBili  x   /  AST  13  /  ALT  18  /  AlkPhos  97  09-19              Urinalysis Basic - ( 19 Sep 2024 05:38 )    Color: x / Appearance: x / SG: x / pH: x  Gluc: 82 mg/dL / Ketone: x  / Bili: x / Urobili: x   Blood: x / Protein: x / Nitrite: x   Leuk Esterase: x / RBC: x / WBC x   Sq Epi: x / Non Sq Epi: x / Bacteria: x        COVID-19 PCR: NotDetec (09-11-24 @ 19:24)      RADIOLOGY & ADDITIONAL TESTS:    Care Discussed with Consultants/Other Providers:
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
## 2024-09-20 PROCEDURE — 99232 SBSQ HOSP IP/OBS MODERATE 35: CPT

## 2024-09-20 RX ORDER — SENNOSIDES 8.6 MG
2 TABLET ORAL AT BEDTIME
Refills: 0 | Status: DISCONTINUED | OUTPATIENT
Start: 2024-09-20 | End: 2024-09-24

## 2024-09-20 RX ORDER — SALINE LAXATIVE 7; 19 G/118ML; G/118ML
1 ENEMA RECTAL ONCE
Refills: 0 | Status: COMPLETED | OUTPATIENT
Start: 2024-09-20 | End: 2024-09-20

## 2024-09-20 RX ORDER — BISACODYL 5 MG/1
5 TABLET, COATED ORAL AT BEDTIME
Refills: 0 | Status: DISCONTINUED | OUTPATIENT
Start: 2024-09-20 | End: 2024-09-24

## 2024-09-20 RX ADMIN — Medication 50 MILLIGRAM(S): at 21:28

## 2024-09-20 RX ADMIN — Medication 5 MILLIGRAM(S): at 12:51

## 2024-09-20 RX ADMIN — Medication 50 MILLIGRAM(S): at 17:33

## 2024-09-20 RX ADMIN — SALINE LAXATIVE 1 ENEMA: 7; 19 ENEMA RECTAL at 20:34

## 2024-09-20 RX ADMIN — Medication 5 MILLIGRAM(S): at 06:20

## 2024-09-20 RX ADMIN — LIDOCAINE 1 PATCH: 50 CREAM TOPICAL at 12:51

## 2024-09-20 RX ADMIN — TRAMADOL HYDROCHLORIDE 50 MILLIGRAM(S): 50 TABLET, COATED ORAL at 22:00

## 2024-09-20 RX ADMIN — GABAPENTIN 600 MILLIGRAM(S): 800 TABLET, FILM COATED ORAL at 17:33

## 2024-09-20 RX ADMIN — DALFAMPRIDINE 10 MILLIGRAM(S): 10 TABLET, FILM COATED, EXTENDED RELEASE ORAL at 21:28

## 2024-09-20 RX ADMIN — Medication 2 TABLET(S): at 21:29

## 2024-09-20 RX ADMIN — DALFAMPRIDINE 10 MILLIGRAM(S): 10 TABLET, FILM COATED, EXTENDED RELEASE ORAL at 08:18

## 2024-09-20 RX ADMIN — LIDOCAINE 1 PATCH: 50 CREAM TOPICAL at 00:49

## 2024-09-20 RX ADMIN — PANTOPRAZOLE SODIUM 40 MILLIGRAM(S): 40 TABLET, DELAYED RELEASE ORAL at 06:20

## 2024-09-20 RX ADMIN — DEXTROAMPHETAMINE SULFATE, DEXTROAMPHETAMINE SACCHARATE, AMPHETAMINE SULFATE AND AMPHETAMINE ASPARTATE 10 MILLIGRAM(S): 6.25; 6.25; 6.25; 6.25 CAPSULE, EXTENDED RELEASE ORAL at 08:18

## 2024-09-20 RX ADMIN — TRAMADOL HYDROCHLORIDE 50 MILLIGRAM(S): 50 TABLET, COATED ORAL at 21:41

## 2024-09-20 RX ADMIN — GABAPENTIN 600 MILLIGRAM(S): 800 TABLET, FILM COATED ORAL at 06:20

## 2024-09-20 RX ADMIN — ENOXAPARIN SODIUM 30 MILLIGRAM(S): 150 INJECTION SUBCUTANEOUS at 06:20

## 2024-09-20 RX ADMIN — DEXTROAMPHETAMINE SULFATE, DEXTROAMPHETAMINE SACCHARATE, AMPHETAMINE SULFATE AND AMPHETAMINE ASPARTATE 10 MILLIGRAM(S): 6.25; 6.25; 6.25; 6.25 CAPSULE, EXTENDED RELEASE ORAL at 12:51

## 2024-09-20 RX ADMIN — Medication 20 MILLIGRAM(S): at 21:28

## 2024-09-20 RX ADMIN — ANTI-ITCH CREAM 1 APPLICATION(S): 1 OINTMENT TOPICAL at 06:21

## 2024-09-20 RX ADMIN — TRAMADOL HYDROCHLORIDE 25 MILLIGRAM(S): 50 TABLET, COATED ORAL at 06:20

## 2024-09-20 RX ADMIN — LIDOCAINE 1 PATCH: 50 CREAM TOPICAL at 19:00

## 2024-09-20 RX ADMIN — Medication 20 MILLIGRAM(S): at 06:20

## 2024-09-20 RX ADMIN — Medication 50 MILLIGRAM(S): at 06:20

## 2024-09-20 RX ADMIN — BISACODYL 5 MILLIGRAM(S): 5 TABLET, COATED ORAL at 21:28

## 2024-09-20 RX ADMIN — Medication 6 MILLIGRAM(S): at 21:35

## 2024-09-20 RX ADMIN — ANTI-ITCH CREAM 1 APPLICATION(S): 1 OINTMENT TOPICAL at 17:28

## 2024-09-20 RX ADMIN — Medication 20 MILLIGRAM(S): at 15:15
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
## 2024-09-21 PROCEDURE — 99232 SBSQ HOSP IP/OBS MODERATE 35: CPT

## 2024-09-21 PROCEDURE — 99232 SBSQ HOSP IP/OBS MODERATE 35: CPT | Mod: GC

## 2024-09-21 RX ADMIN — GABAPENTIN 600 MILLIGRAM(S): 800 TABLET, FILM COATED ORAL at 17:33

## 2024-09-21 RX ADMIN — ENOXAPARIN SODIUM 30 MILLIGRAM(S): 150 INJECTION SUBCUTANEOUS at 06:30

## 2024-09-21 RX ADMIN — Medication 20 MILLIGRAM(S): at 21:43

## 2024-09-21 RX ADMIN — LIDOCAINE 1 PATCH: 50 CREAM TOPICAL at 12:57

## 2024-09-21 RX ADMIN — LIDOCAINE 1 PATCH: 50 CREAM TOPICAL at 00:00

## 2024-09-21 RX ADMIN — Medication 50 MILLIGRAM(S): at 06:29

## 2024-09-21 RX ADMIN — TRAMADOL HYDROCHLORIDE 25 MILLIGRAM(S): 50 TABLET, COATED ORAL at 13:45

## 2024-09-21 RX ADMIN — DALFAMPRIDINE 10 MILLIGRAM(S): 10 TABLET, FILM COATED, EXTENDED RELEASE ORAL at 08:22

## 2024-09-21 RX ADMIN — DEXTROAMPHETAMINE SULFATE, DEXTROAMPHETAMINE SACCHARATE, AMPHETAMINE SULFATE AND AMPHETAMINE ASPARTATE 10 MILLIGRAM(S): 6.25; 6.25; 6.25; 6.25 CAPSULE, EXTENDED RELEASE ORAL at 08:21

## 2024-09-21 RX ADMIN — LIDOCAINE 1 PATCH: 50 CREAM TOPICAL at 19:00

## 2024-09-21 RX ADMIN — DALFAMPRIDINE 10 MILLIGRAM(S): 10 TABLET, FILM COATED, EXTENDED RELEASE ORAL at 20:26

## 2024-09-21 RX ADMIN — ANTI-ITCH CREAM 1 APPLICATION(S): 1 OINTMENT TOPICAL at 17:36

## 2024-09-21 RX ADMIN — Medication 50 MILLIGRAM(S): at 17:34

## 2024-09-21 RX ADMIN — Medication 50 MILLIGRAM(S): at 21:43

## 2024-09-21 RX ADMIN — Medication 20 MILLIGRAM(S): at 06:28

## 2024-09-21 RX ADMIN — PANTOPRAZOLE SODIUM 40 MILLIGRAM(S): 40 TABLET, DELAYED RELEASE ORAL at 06:29

## 2024-09-21 RX ADMIN — Medication 20 MILLIGRAM(S): at 15:31

## 2024-09-21 RX ADMIN — TRAMADOL HYDROCHLORIDE 50 MILLIGRAM(S): 50 TABLET, COATED ORAL at 21:44

## 2024-09-21 RX ADMIN — DEXTROAMPHETAMINE SULFATE, DEXTROAMPHETAMINE SACCHARATE, AMPHETAMINE SULFATE AND AMPHETAMINE ASPARTATE 10 MILLIGRAM(S): 6.25; 6.25; 6.25; 6.25 CAPSULE, EXTENDED RELEASE ORAL at 12:58

## 2024-09-21 RX ADMIN — TRAMADOL HYDROCHLORIDE 25 MILLIGRAM(S): 50 TABLET, COATED ORAL at 13:13

## 2024-09-21 RX ADMIN — GABAPENTIN 600 MILLIGRAM(S): 800 TABLET, FILM COATED ORAL at 06:28

## 2024-09-21 RX ADMIN — Medication 5 MILLIGRAM(S): at 12:58

## 2024-09-21 RX ADMIN — Medication 5 MILLIGRAM(S): at 06:29

## 2024-09-21 RX ADMIN — Medication 6 MILLIGRAM(S): at 21:43

## 2024-09-21 NOTE — PROGRESS NOTE ADULT - SUBJECTIVE AND OBJECTIVE BOX
Patient seen and examined at bedside. no complaints.      ALLERGIES:  No Known Allergies    MEDICATIONS  (STANDING):  amitriptyline 50 milliGRAM(s) Oral at bedtime  amLODIPine   Tablet 5 milliGRAM(s) Oral daily  amphetamine/dextroamphetamine 10 milliGRAM(s) Oral <User Schedule>  baclofen 20 milliGRAM(s) Oral every 8 hours  bisacodyl 5 milliGRAM(s) Oral at bedtime  dalfampridine ER 10 milliGRAM(s) Oral every 12 hours  enoxaparin Injectable 30 milliGRAM(s) SubCutaneous every 24 hours  escitalopram 5 milliGRAM(s) Oral daily  gabapentin 600 milliGRAM(s) Oral two times a day  Halobetasol 0.05% 1 Application(s),Halobetasol 0.05% 1 Application(s) 1 Application(s) Topical three times a day  hydrocortisone 1% Cream 1 Application(s) Topical two times a day  influenza   Vaccine 0.5 milliLiter(s) IntraMuscular once  lidocaine   4% Patch 1 Patch Transdermal daily  pantoprazole    Tablet 40 milliGRAM(s) Oral before breakfast  polyethylene glycol 3350 17 Gram(s) Oral daily  primidone 50 milliGRAM(s) Oral two times a day  senna 2 Tablet(s) Oral at bedtime    MEDICATIONS  (PRN):  acetaminophen     Tablet .. 650 milliGRAM(s) Oral every 6 hours PRN Temp greater or equal to 38C (100.4F), Mild Pain (1 - 3)  aluminum hydroxide/magnesium hydroxide/simethicone Suspension 30 milliLiter(s) Oral every 4 hours PRN Dyspepsia  bisacodyl Suppository 10 milliGRAM(s) Rectal daily PRN Constipation  melatonin 6 milliGRAM(s) Oral at bedtime PRN Insomnia  ondansetron Injectable 4 milliGRAM(s) IV Push every 8 hours PRN Nausea and/or Vomiting  traMADol 50 milliGRAM(s) Oral every 12 hours PRN Severe Pain (7 - 10)  traMADol 25 milliGRAM(s) Oral every 6 hours PRN Moderate Pain (4 - 6)    Vital Signs Last 24 Hrs  T(F): 98 (21 Sep 2024 08:48), Max: 98 (21 Sep 2024 08:48)  HR: 79 (21 Sep 2024 08:48) (78 - 79)  BP: 113/76 (21 Sep 2024 08:48) (113/76 - 144/88)  RR: 15 (21 Sep 2024 08:48) (15 - 16)  SpO2: 98% (21 Sep 2024 08:48) (96% - 98%)  I&O's Summary    BMI (kg/m2): 19.8 (09-18-24 @ 05:58)  PHYSICAL EXAM:    Gen: nad, resting in bed  Neuro: aaox3, no focal deficits  Heent: eomi b/l, no jvd, no oral exudates  Pulm: cta b/l, no w/r/r  CV: +s1s2, no m/r/g  Ab: soft, nt/nd, normoactive bs x 4  Extrem: no edema, pulses intact and equal  Skin: no rashes  Psych: normal    LABS:                        12.2   6.46  )-----------( 313      ( 19 Sep 2024 05:38 )             37.0       09-19    139  |  102  |  15  ----------------------------<  82  4.2   |  32  |  0.67    Ca    9.1      19 Sep 2024 05:38    TPro  6.1  /  Alb  3.2  /  TBili  0.2  /  DBili  x   /  AST  13  /  ALT  18  /  AlkPhos  97  09-19            Urinalysis Basic - ( 19 Sep 2024 05:38 )    Color: x / Appearance: x / SG: x / pH: x  Gluc: 82 mg/dL / Ketone: x  / Bili: x / Urobili: x   Blood: x / Protein: x / Nitrite: x   Leuk Esterase: x / RBC: x / WBC x   Sq Epi: x / Non Sq Epi: x / Bacteria: x        COVID-19 PCR: NotDetec (09-11-24 @ 19:24)      RADIOLOGY & ADDITIONAL TESTS:    Care Discussed with Consultants/Other Providers:
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
## 2024-09-22 PROCEDURE — 99233 SBSQ HOSP IP/OBS HIGH 50: CPT

## 2024-09-22 PROCEDURE — 99232 SBSQ HOSP IP/OBS MODERATE 35: CPT

## 2024-09-22 PROCEDURE — 99232 SBSQ HOSP IP/OBS MODERATE 35: CPT | Mod: GC

## 2024-09-22 RX ORDER — ESCITALOPRAM OXALATE 10 MG
10 TABLET ORAL DAILY
Refills: 0 | Status: DISCONTINUED | OUTPATIENT
Start: 2024-09-23 | End: 2024-09-24

## 2024-09-22 RX ADMIN — Medication 50 MILLIGRAM(S): at 07:34

## 2024-09-22 RX ADMIN — DEXTROAMPHETAMINE SULFATE, DEXTROAMPHETAMINE SACCHARATE, AMPHETAMINE SULFATE AND AMPHETAMINE ASPARTATE 10 MILLIGRAM(S): 6.25; 6.25; 6.25; 6.25 CAPSULE, EXTENDED RELEASE ORAL at 08:20

## 2024-09-22 RX ADMIN — DEXTROAMPHETAMINE SULFATE, DEXTROAMPHETAMINE SACCHARATE, AMPHETAMINE SULFATE AND AMPHETAMINE ASPARTATE 10 MILLIGRAM(S): 6.25; 6.25; 6.25; 6.25 CAPSULE, EXTENDED RELEASE ORAL at 12:40

## 2024-09-22 RX ADMIN — ENOXAPARIN SODIUM 30 MILLIGRAM(S): 150 INJECTION SUBCUTANEOUS at 07:35

## 2024-09-22 RX ADMIN — ANTI-ITCH CREAM 1 APPLICATION(S): 1 OINTMENT TOPICAL at 07:36

## 2024-09-22 RX ADMIN — Medication 50 MILLIGRAM(S): at 21:55

## 2024-09-22 RX ADMIN — Medication 5 MILLIGRAM(S): at 12:40

## 2024-09-22 RX ADMIN — LIDOCAINE 1 PATCH: 50 CREAM TOPICAL at 00:00

## 2024-09-22 RX ADMIN — TRAMADOL HYDROCHLORIDE 50 MILLIGRAM(S): 50 TABLET, COATED ORAL at 21:52

## 2024-09-22 RX ADMIN — DALFAMPRIDINE 10 MILLIGRAM(S): 10 TABLET, FILM COATED, EXTENDED RELEASE ORAL at 21:53

## 2024-09-22 RX ADMIN — Medication 20 MILLIGRAM(S): at 07:33

## 2024-09-22 RX ADMIN — Medication 2 TABLET(S): at 21:51

## 2024-09-22 RX ADMIN — Medication 20 MILLIGRAM(S): at 21:51

## 2024-09-22 RX ADMIN — TRAMADOL HYDROCHLORIDE 50 MILLIGRAM(S): 50 TABLET, COATED ORAL at 22:52

## 2024-09-22 RX ADMIN — GABAPENTIN 600 MILLIGRAM(S): 800 TABLET, FILM COATED ORAL at 07:33

## 2024-09-22 RX ADMIN — PANTOPRAZOLE SODIUM 40 MILLIGRAM(S): 40 TABLET, DELAYED RELEASE ORAL at 07:34

## 2024-09-22 RX ADMIN — Medication 5 MILLIGRAM(S): at 07:34

## 2024-09-22 RX ADMIN — DALFAMPRIDINE 10 MILLIGRAM(S): 10 TABLET, FILM COATED, EXTENDED RELEASE ORAL at 07:34

## 2024-09-22 RX ADMIN — BISACODYL 5 MILLIGRAM(S): 5 TABLET, COATED ORAL at 21:51

## 2024-09-22 RX ADMIN — LIDOCAINE 1 PATCH: 50 CREAM TOPICAL at 19:53

## 2024-09-22 RX ADMIN — LIDOCAINE 1 PATCH: 50 CREAM TOPICAL at 12:39

## 2024-09-22 RX ADMIN — Medication 20 MILLIGRAM(S): at 14:26

## 2024-09-22 RX ADMIN — GABAPENTIN 600 MILLIGRAM(S): 800 TABLET, FILM COATED ORAL at 17:47

## 2024-09-22 RX ADMIN — Medication 50 MILLIGRAM(S): at 17:48

## 2024-09-22 NOTE — BH CONSULTATION LIAISON PROGRESS NOTE - ADDITIONAL PSYCHIATRIC MEDICATIONS
Adderall 10mg @ 0800; 1300  Lexapro 5mg daily  Elavil 50mg qhs
Adderall 10mg @ 0800; 1300  Lexapro 5mg daily  Elavil 50mg qhs

## 2024-09-22 NOTE — BH CONSULTATION LIAISON PROGRESS NOTE - NSBHCONSULTRECOMMENDOTHER_PSY_A_CORE FT
Continue with:  -Adderall 10mg @ 0800; 1300  -Lexapro 10 mg daily  -Elavil 50mg qhs    See below for outpatient recs. 
Continue with:  -Adderall 10mg @ 0800; 1300  -Lexapro 5mg daily  -Elavil 50mg qhs    See below for outpatient recs.

## 2024-09-22 NOTE — BH CONSULTATION LIAISON PROGRESS NOTE - NSBHCONSULTPRIMARYDISCUSSYES_PSY_A_CORE FT
Arrived for prolia injection. Indication and side effects reviewed. Denies recent dental work. Labs and medications verified. Prolia administered in L arm without incidence. Instructed to call prescribing MD for any concerns or questions and instructed on how to schedule future appts.  Pt vu and discharged ambulatory.    
See above. 
See above.

## 2024-09-22 NOTE — PROGRESS NOTE ADULT - SUBJECTIVE AND OBJECTIVE BOX
Patient seen and examined at bedside. no complaints.      ALLERGIES:  No Known Allergies    MEDICATIONS  (STANDING):  amitriptyline 50 milliGRAM(s) Oral at bedtime  amLODIPine   Tablet 5 milliGRAM(s) Oral daily  amphetamine/dextroamphetamine 10 milliGRAM(s) Oral <User Schedule>  baclofen 20 milliGRAM(s) Oral every 8 hours  bisacodyl 5 milliGRAM(s) Oral at bedtime  dalfampridine ER 10 milliGRAM(s) Oral every 12 hours  enoxaparin Injectable 30 milliGRAM(s) SubCutaneous every 24 hours  escitalopram 5 milliGRAM(s) Oral daily  gabapentin 600 milliGRAM(s) Oral two times a day  Halobetasol 0.05% 1 Application(s),Halobetasol 0.05% 1 Application(s) 1 Application(s) Topical three times a day  hydrocortisone 1% Cream 1 Application(s) Topical two times a day  influenza   Vaccine 0.5 milliLiter(s) IntraMuscular once  lidocaine   4% Patch 1 Patch Transdermal daily  pantoprazole    Tablet 40 milliGRAM(s) Oral before breakfast  polyethylene glycol 3350 17 Gram(s) Oral daily  primidone 50 milliGRAM(s) Oral two times a day  senna 2 Tablet(s) Oral at bedtime    MEDICATIONS  (PRN):  acetaminophen     Tablet .. 650 milliGRAM(s) Oral every 6 hours PRN Temp greater or equal to 38C (100.4F), Mild Pain (1 - 3)  aluminum hydroxide/magnesium hydroxide/simethicone Suspension 30 milliLiter(s) Oral every 4 hours PRN Dyspepsia  bisacodyl Suppository 10 milliGRAM(s) Rectal daily PRN Constipation  melatonin 6 milliGRAM(s) Oral at bedtime PRN Insomnia  ondansetron Injectable 4 milliGRAM(s) IV Push every 8 hours PRN Nausea and/or Vomiting  traMADol 25 milliGRAM(s) Oral every 6 hours PRN Moderate Pain (4 - 6)  traMADol 50 milliGRAM(s) Oral every 12 hours PRN Severe Pain (7 - 10)    Vital Signs Last 24 Hrs  T(F): 98.1 (22 Sep 2024 08:49), Max: 98.1 (22 Sep 2024 08:49)  HR: 72 (22 Sep 2024 08:49) (72 - 89)  BP: 105/70 (22 Sep 2024 08:49) (105/70 - 137/90)  RR: 15 (22 Sep 2024 08:49) (15 - 16)  SpO2: 99% (22 Sep 2024 08:49) (99% - 99%)  I&O's Summary    BMI (kg/m2): 19.8 (09-18-24 @ 05:58)  PHYSICAL EXAM:    Gen: nad, resting in bed  Neuro: aaox3, no focal deficits  Heent: eomi b/l, no jvd, no oral exudates  Pulm: cta b/l, no w/r/r  CV: +s1s2, no m/r/g  Ab: soft, nt/nd, normoactive bs x 4  Extrem: no edema, pulses intact and equal  Skin: no rashes  Psych: normal    LABS:              COVID-19 PCR: NotDetec (09-11-24 @ 19:24)      RADIOLOGY & ADDITIONAL TESTS:    Care Discussed with Consultants/Other Providers:

## 2024-09-22 NOTE — BH CONSULTATION LIAISON PROGRESS NOTE - CURRENT MEDICATION
MEDICATIONS  (STANDING):  amitriptyline 50 milliGRAM(s) Oral at bedtime  amLODIPine   Tablet 5 milliGRAM(s) Oral daily  amphetamine/dextroamphetamine 10 milliGRAM(s) Oral <User Schedule>  baclofen 20 milliGRAM(s) Oral every 8 hours  bisacodyl 5 milliGRAM(s) Oral at bedtime  dalfampridine ER 10 milliGRAM(s) Oral every 12 hours  enoxaparin Injectable 30 milliGRAM(s) SubCutaneous every 24 hours  escitalopram 10 milliGRAM(s) Oral daily  gabapentin 600 milliGRAM(s) Oral two times a day  Halobetasol 0.05% 1 Application(s),Halobetasol 0.05% 1 Application(s) 1 Application(s) Topical three times a day  hydrocortisone 1% Cream 1 Application(s) Topical two times a day  influenza   Vaccine 0.5 milliLiter(s) IntraMuscular once  lidocaine   4% Patch 1 Patch Transdermal daily  pantoprazole    Tablet 40 milliGRAM(s) Oral before breakfast  polyethylene glycol 3350 17 Gram(s) Oral daily  primidone 50 milliGRAM(s) Oral two times a day  senna 2 Tablet(s) Oral at bedtime    MEDICATIONS  (PRN):  acetaminophen     Tablet .. 650 milliGRAM(s) Oral every 6 hours PRN Temp greater or equal to 38C (100.4F), Mild Pain (1 - 3)  aluminum hydroxide/magnesium hydroxide/simethicone Suspension 30 milliLiter(s) Oral every 4 hours PRN Dyspepsia  bisacodyl Suppository 10 milliGRAM(s) Rectal daily PRN Constipation  melatonin 6 milliGRAM(s) Oral at bedtime PRN Insomnia  ondansetron Injectable 4 milliGRAM(s) IV Push every 8 hours PRN Nausea and/or Vomiting  traMADol 25 milliGRAM(s) Oral every 6 hours PRN Moderate Pain (4 - 6)  traMADol 50 milliGRAM(s) Oral every 12 hours PRN Severe Pain (7 - 10)  
MEDICATIONS  (STANDING):  amitriptyline 50 milliGRAM(s) Oral at bedtime  amLODIPine   Tablet 5 milliGRAM(s) Oral daily  amphetamine/dextroamphetamine 10 milliGRAM(s) Oral <User Schedule>  baclofen 20 milliGRAM(s) Oral every 8 hours  dalfampridine ER 10 milliGRAM(s) Oral every 12 hours  enoxaparin Injectable 30 milliGRAM(s) SubCutaneous every 24 hours  escitalopram 5 milliGRAM(s) Oral daily  gabapentin 600 milliGRAM(s) Oral two times a day  Halobetasol 0.05% 1 Application(s),Halobetasol 0.05% 1 Application(s) 1 Application(s) Topical three times a day  hydrocortisone 1% Cream 1 Application(s) Topical two times a day  influenza   Vaccine 0.5 milliLiter(s) IntraMuscular once  lidocaine   4% Patch 1 Patch Transdermal daily  pantoprazole    Tablet 40 milliGRAM(s) Oral before breakfast  polyethylene glycol 3350 17 Gram(s) Oral daily  primidone 50 milliGRAM(s) Oral two times a day    MEDICATIONS  (PRN):  acetaminophen     Tablet .. 650 milliGRAM(s) Oral every 6 hours PRN Temp greater or equal to 38C (100.4F), Mild Pain (1 - 3)  aluminum hydroxide/magnesium hydroxide/simethicone Suspension 30 milliLiter(s) Oral every 4 hours PRN Dyspepsia  bisacodyl Suppository 10 milliGRAM(s) Rectal daily PRN Constipation  melatonin 6 milliGRAM(s) Oral at bedtime PRN Insomnia  ondansetron Injectable 4 milliGRAM(s) IV Push every 8 hours PRN Nausea and/or Vomiting  traMADol 25 milliGRAM(s) Oral every 6 hours PRN Moderate Pain (4 - 6)  traMADol 50 milliGRAM(s) Oral every 12 hours PRN Severe Pain (7 - 10)

## 2024-09-22 NOTE — BH CONSULTATION LIAISON PROGRESS NOTE - NSBHCHARTREVIEWLAB_PSY_A_CORE FT
Complete Blood Count (09.16.24 @ 05:20)   Nucleated RBC: 0 /100 WBCs  WBC Count: 5.98 K/uL  RBC Count: 4.10 M/uL  Hemoglobin: 12.9 g/dL  Hematocrit: 37.7 %  Mean Cell Volume: 92.0 fl  Mean Cell Hemoglobin: 31.5 pg  Mean Cell Hemoglobin Conc: 34.2 gm/dL  Red Cell Distrib Width: 12.0 %  Platelet Count - Automated: 325 K/uL    Comprehensive Metabolic Panel (09.16.24 @ 05:20)   Sodium: 138 mmol/L  Potassium: 3.9 mmol/L  Chloride: 101 mmol/L  Carbon Dioxide: 31 mmol/L  Anion Gap: 6 mmol/L  Blood Urea Nitrogen: 16 mg/dL  Creatinine: 0.63 mg/dL  Glucose: 87 mg/dL  Calcium: 9.3 mg/dL  Protein Total: 6.2 g/dL  Albumin: 3.3 g/dL  Bilirubin Total: 0.4 mg/dL  Alkaline Phosphatase: 107 U/L  Aspartate Aminotransferase (AST/SGOT): 15 U/L  Alanine Aminotransferase (ALT/SGPT): 18 U/L  eGFR: 108: 
Complete Blood Count (09.16.24 @ 05:20)   Nucleated RBC: 0 /100 WBCs  WBC Count: 5.98 K/uL  RBC Count: 4.10 M/uL  Hemoglobin: 12.9 g/dL  Hematocrit: 37.7 %  Mean Cell Volume: 92.0 fl  Mean Cell Hemoglobin: 31.5 pg  Mean Cell Hemoglobin Conc: 34.2 gm/dL  Red Cell Distrib Width: 12.0 %  Platelet Count - Automated: 325 K/uL    Comprehensive Metabolic Panel (09.16.24 @ 05:20)   Sodium: 138 mmol/L  Potassium: 3.9 mmol/L  Chloride: 101 mmol/L  Carbon Dioxide: 31 mmol/L  Anion Gap: 6 mmol/L  Blood Urea Nitrogen: 16 mg/dL  Creatinine: 0.63 mg/dL  Glucose: 87 mg/dL  Calcium: 9.3 mg/dL  Protein Total: 6.2 g/dL  Albumin: 3.3 g/dL  Bilirubin Total: 0.4 mg/dL  Alkaline Phosphatase: 107 U/L  Aspartate Aminotransferase (AST/SGOT): 15 U/L  Alanine Aminotransferase (ALT/SGPT): 18 U/L  eGFR: 108:
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
## 2024-09-22 NOTE — BH CONSULTATION LIAISON PROGRESS NOTE - NSBHFUPINTERVALCCFT_PSY_A_CORE
"I am better with lexapro, almost no tears / I am going home Tuesday,  it will be adjustment. " 
"It's better."

## 2024-09-22 NOTE — BH CONSULTATION LIAISON PROGRESS NOTE - NSBHATTESTBILLING_PSY_A_CORE
27631-Pkbrordhhw OBS or IP - high complexity OR 50-79 mins
98966-Bozrhwfnnj OBS or IP - moderate complexity OR 35-49 mins
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
## 2024-09-22 NOTE — BH CONSULTATION LIAISON PROGRESS NOTE - NSBHCONSULTFOLLOWAFTERCARE_PSY_A_CORE FT
Recommend f/u with:   1. [facility] [place]: [phone]  2. [facility] [place]: [phone]  3. Psychiatric providers in network w/ patient's insurance near [place] 
Recommend f/u with:   1. [facility] at [place]: [phone]  2. [facility] [place]: [phone]  3. Psychiatric providers in network w/ patient's insurance near [place]

## 2024-09-22 NOTE — BH CONSULTATION LIAISON PROGRESS NOTE - NSBHCHARTREVIEWVS_PSY_A_CORE FT
Vital Signs Last 24 Hrs  T(C): 36.5 (16 Sep 2024 08:26), Max: 36.7 (15 Sep 2024 20:30)  T(F): 97.7 (16 Sep 2024 08:26), Max: 98 (15 Sep 2024 20:30)  HR: 97 (16 Sep 2024 08:26) (93 - 97)  BP: 118/78 (16 Sep 2024 08:26) (116/84 - 139/86)  BP(mean): --  RR: 16 (16 Sep 2024 08:26) (16 - 16)  SpO2: 98% (16 Sep 2024 08:26) (97% - 98%)    Parameters below as of 16 Sep 2024 08:26  Patient On (Oxygen Delivery Method): room air    
Vital Signs Last 24 Hrs  T(C): 36.7 (22 Sep 2024 08:49), Max: 36.7 (22 Sep 2024 08:49)  T(F): 98.1 (22 Sep 2024 08:49), Max: 98.1 (22 Sep 2024 08:49)  HR: 72 (22 Sep 2024 08:49) (72 - 89)  BP: 105/70 (22 Sep 2024 08:49) (105/70 - 137/90)  BP(mean): --  RR: 15 (22 Sep 2024 08:49) (15 - 16)  SpO2: 99% (22 Sep 2024 08:49) (99% - 99%)    Parameters below as of 22 Sep 2024 08:49  Patient On (Oxygen Delivery Method): room air

## 2024-09-23 ENCOUNTER — TRANSCRIPTION ENCOUNTER (OUTPATIENT)
Age: 49
End: 2024-09-23

## 2024-09-23 LAB
ALBUMIN SERPL ELPH-MCNC: 3.1 G/DL — LOW (ref 3.3–5)
ALP SERPL-CCNC: 96 U/L — SIGNIFICANT CHANGE UP (ref 40–120)
ALT FLD-CCNC: 15 U/L — SIGNIFICANT CHANGE UP (ref 10–45)
ANION GAP SERPL CALC-SCNC: 6 MMOL/L — SIGNIFICANT CHANGE UP (ref 5–17)
AST SERPL-CCNC: 9 U/L — LOW (ref 10–40)
BILIRUB SERPL-MCNC: 0.2 MG/DL — SIGNIFICANT CHANGE UP (ref 0.2–1.2)
BUN SERPL-MCNC: 17 MG/DL — SIGNIFICANT CHANGE UP (ref 7–23)
CALCIUM SERPL-MCNC: 8.9 MG/DL — SIGNIFICANT CHANGE UP (ref 8.4–10.5)
CHLORIDE SERPL-SCNC: 102 MMOL/L — SIGNIFICANT CHANGE UP (ref 96–108)
CO2 SERPL-SCNC: 31 MMOL/L — SIGNIFICANT CHANGE UP (ref 22–31)
CREAT SERPL-MCNC: 0.67 MG/DL — SIGNIFICANT CHANGE UP (ref 0.5–1.3)
EGFR: 106 ML/MIN/1.73M2 — SIGNIFICANT CHANGE UP
GLUCOSE SERPL-MCNC: 87 MG/DL — SIGNIFICANT CHANGE UP (ref 70–99)
HCT VFR BLD CALC: 37.1 % — SIGNIFICANT CHANGE UP (ref 34.5–45)
HGB BLD-MCNC: 12.5 G/DL — SIGNIFICANT CHANGE UP (ref 11.5–15.5)
MCHC RBC-ENTMCNC: 31.3 PG — SIGNIFICANT CHANGE UP (ref 27–34)
MCHC RBC-ENTMCNC: 33.7 GM/DL — SIGNIFICANT CHANGE UP (ref 32–36)
MCV RBC AUTO: 93 FL — SIGNIFICANT CHANGE UP (ref 80–100)
NRBC # BLD: 0 /100 WBCS — SIGNIFICANT CHANGE UP (ref 0–0)
PLATELET # BLD AUTO: 341 K/UL — SIGNIFICANT CHANGE UP (ref 150–400)
POTASSIUM SERPL-MCNC: 3.7 MMOL/L — SIGNIFICANT CHANGE UP (ref 3.5–5.3)
POTASSIUM SERPL-SCNC: 3.7 MMOL/L — SIGNIFICANT CHANGE UP (ref 3.5–5.3)
PROT SERPL-MCNC: 5.9 G/DL — LOW (ref 6–8.3)
RBC # BLD: 3.99 M/UL — SIGNIFICANT CHANGE UP (ref 3.8–5.2)
RBC # FLD: 11.9 % — SIGNIFICANT CHANGE UP (ref 10.3–14.5)
SODIUM SERPL-SCNC: 139 MMOL/L — SIGNIFICANT CHANGE UP (ref 135–145)
WBC # BLD: 6.29 K/UL — SIGNIFICANT CHANGE UP (ref 3.8–10.5)
WBC # FLD AUTO: 6.29 K/UL — SIGNIFICANT CHANGE UP (ref 3.8–10.5)

## 2024-09-23 PROCEDURE — 99232 SBSQ HOSP IP/OBS MODERATE 35: CPT

## 2024-09-23 RX ORDER — DALFAMPRIDINE 10 MG/1
1 TABLET, EXTENDED RELEASE ORAL
Refills: 0 | DISCHARGE

## 2024-09-23 RX ORDER — PANTOPRAZOLE SODIUM 40 MG/1
1 TABLET, DELAYED RELEASE ORAL
Qty: 30 | Refills: 0
Start: 2024-09-23 | End: 2024-10-22

## 2024-09-23 RX ORDER — BISACODYL 5 MG/1
1 TABLET, COATED ORAL
Qty: 0 | Refills: 0 | DISCHARGE
Start: 2024-09-23

## 2024-09-23 RX ORDER — ACETAMINOPHEN 325 MG
2 TABLET ORAL
Qty: 0 | Refills: 0 | DISCHARGE
Start: 2024-09-23

## 2024-09-23 RX ORDER — SENNOSIDES 8.6 MG
2 TABLET ORAL
Qty: 60 | Refills: 0
Start: 2024-09-23 | End: 2024-10-22

## 2024-09-23 RX ORDER — AMLODIPINE BESYLATE 5 MG
1 TABLET ORAL
Qty: 30 | Refills: 0
Start: 2024-09-23 | End: 2024-10-22

## 2024-09-23 RX ORDER — DEXTROAMPHETAMINE SULFATE, DEXTROAMPHETAMINE SACCHARATE, AMPHETAMINE SULFATE AND AMPHETAMINE ASPARTATE 6.25; 6.25; 6.25; 6.25 MG/1; MG/1; MG/1; MG/1
10 CAPSULE, EXTENDED RELEASE ORAL
Refills: 0 | Status: COMPLETED | OUTPATIENT
Start: 2024-09-23 | End: 2024-09-24

## 2024-09-23 RX ORDER — TRAMADOL HYDROCHLORIDE 50 MG/1
1 TABLET, COATED ORAL
Qty: 28 | Refills: 0
Start: 2024-09-23 | End: 2024-09-29

## 2024-09-23 RX ORDER — DALFAMPRIDINE 10 MG/1
1 TABLET, FILM COATED, EXTENDED RELEASE ORAL
Qty: 60 | Refills: 0
Start: 2024-09-23 | End: 2024-10-22

## 2024-09-23 RX ORDER — AMITRIPTYLINE HYDROCHLORIDE 25 MG/1
1 TABLET, FILM COATED ORAL
Refills: 0 | DISCHARGE

## 2024-09-23 RX ORDER — GABAPENTIN 800 MG/1
2 TABLET, FILM COATED ORAL
Qty: 120 | Refills: 0
Start: 2024-09-23 | End: 2024-10-22

## 2024-09-23 RX ORDER — AMITRIPTYLINE HCL 25 MG
1 TABLET ORAL
Qty: 30 | Refills: 0
Start: 2024-09-23 | End: 2024-10-22

## 2024-09-23 RX ORDER — PRIMIDONE 250 MG
1 TABLET ORAL
Qty: 60 | Refills: 0
Start: 2024-09-23 | End: 2024-10-22

## 2024-09-23 RX ORDER — ANTI-ITCH CREAM 1 G/100G
1 OINTMENT TOPICAL
Qty: 0 | Refills: 0 | DISCHARGE
Start: 2024-09-23

## 2024-09-23 RX ORDER — BACLOFEN 100 %
1 POWDER (GRAM) MISCELLANEOUS
Qty: 87 | Refills: 0
Start: 2024-09-23 | End: 2024-10-21

## 2024-09-23 RX ORDER — ESCITALOPRAM OXALATE 10 MG
1 TABLET ORAL
Qty: 30 | Refills: 0
Start: 2024-09-23 | End: 2024-10-22

## 2024-09-23 RX ORDER — DEXTROAMPHETAMINE SULFATE, DEXTROAMPHETAMINE SACCHARATE, AMPHETAMINE SULFATE AND AMPHETAMINE ASPARTATE 6.25; 6.25; 6.25; 6.25 MG/1; MG/1; MG/1; MG/1
1 CAPSULE, EXTENDED RELEASE ORAL
Qty: 30 | Refills: 0
Start: 2024-09-23 | End: 2024-10-22

## 2024-09-23 RX ADMIN — Medication 20 MILLIGRAM(S): at 13:05

## 2024-09-23 RX ADMIN — Medication 5 MILLIGRAM(S): at 06:47

## 2024-09-23 RX ADMIN — TRAMADOL HYDROCHLORIDE 50 MILLIGRAM(S): 50 TABLET, COATED ORAL at 20:45

## 2024-09-23 RX ADMIN — LIDOCAINE 1 PATCH: 50 CREAM TOPICAL at 11:54

## 2024-09-23 RX ADMIN — TRAMADOL HYDROCHLORIDE 25 MILLIGRAM(S): 50 TABLET, COATED ORAL at 11:53

## 2024-09-23 RX ADMIN — DALFAMPRIDINE 10 MILLIGRAM(S): 10 TABLET, FILM COATED, EXTENDED RELEASE ORAL at 20:45

## 2024-09-23 RX ADMIN — LIDOCAINE 1 PATCH: 50 CREAM TOPICAL at 23:52

## 2024-09-23 RX ADMIN — Medication 20 MILLIGRAM(S): at 21:14

## 2024-09-23 RX ADMIN — LIDOCAINE 1 PATCH: 50 CREAM TOPICAL at 19:11

## 2024-09-23 RX ADMIN — Medication 10 MILLIGRAM(S): at 11:54

## 2024-09-23 RX ADMIN — Medication 50 MILLIGRAM(S): at 21:15

## 2024-09-23 RX ADMIN — TRAMADOL HYDROCHLORIDE 50 MILLIGRAM(S): 50 TABLET, COATED ORAL at 21:53

## 2024-09-23 RX ADMIN — Medication 20 MILLIGRAM(S): at 06:46

## 2024-09-23 RX ADMIN — TRAMADOL HYDROCHLORIDE 25 MILLIGRAM(S): 50 TABLET, COATED ORAL at 12:50

## 2024-09-23 RX ADMIN — Medication 50 MILLIGRAM(S): at 18:36

## 2024-09-23 RX ADMIN — ANTI-ITCH CREAM 1 APPLICATION(S): 1 OINTMENT TOPICAL at 06:48

## 2024-09-23 RX ADMIN — PANTOPRAZOLE SODIUM 40 MILLIGRAM(S): 40 TABLET, DELAYED RELEASE ORAL at 06:47

## 2024-09-23 RX ADMIN — GABAPENTIN 600 MILLIGRAM(S): 800 TABLET, FILM COATED ORAL at 18:36

## 2024-09-23 RX ADMIN — DALFAMPRIDINE 10 MILLIGRAM(S): 10 TABLET, FILM COATED, EXTENDED RELEASE ORAL at 09:13

## 2024-09-23 RX ADMIN — Medication 6 MILLIGRAM(S): at 20:44

## 2024-09-23 RX ADMIN — DEXTROAMPHETAMINE SULFATE, DEXTROAMPHETAMINE SACCHARATE, AMPHETAMINE SULFATE AND AMPHETAMINE ASPARTATE 10 MILLIGRAM(S): 6.25; 6.25; 6.25; 6.25 CAPSULE, EXTENDED RELEASE ORAL at 09:12

## 2024-09-23 RX ADMIN — DEXTROAMPHETAMINE SULFATE, DEXTROAMPHETAMINE SACCHARATE, AMPHETAMINE SULFATE AND AMPHETAMINE ASPARTATE 10 MILLIGRAM(S): 6.25; 6.25; 6.25; 6.25 CAPSULE, EXTENDED RELEASE ORAL at 13:05

## 2024-09-23 RX ADMIN — LIDOCAINE 1 PATCH: 50 CREAM TOPICAL at 00:59

## 2024-09-23 RX ADMIN — ENOXAPARIN SODIUM 30 MILLIGRAM(S): 150 INJECTION SUBCUTANEOUS at 06:47

## 2024-09-23 RX ADMIN — Medication 50 MILLIGRAM(S): at 06:47

## 2024-09-23 RX ADMIN — BISACODYL 5 MILLIGRAM(S): 5 TABLET, COATED ORAL at 21:15

## 2024-09-23 RX ADMIN — GABAPENTIN 600 MILLIGRAM(S): 800 TABLET, FILM COATED ORAL at 06:47

## 2024-09-23 NOTE — CHART NOTE - NSCHARTNOTEFT_GEN_A_CORE
Nutrition Follow Up Note  Hospital Course   (Per Electronic Medical Record)    Source:  Patient [X]  Medical Record [X]      Diet:   Regular Diet (IDDSI Level 7) w/ Thin Liquids (IDDSI Level 0)  Tolerates Diet Consistency Well  No Chewing/Swallowing Difficulties  No Recent Nausea, Vomiting, Diarrhea or Constipation (as Per Patient)  Consumes % of Meals (as Per Documentation) - States Good PO Intake/Appetite (Per Patient)  Declines Nutrition Supplementation     Enteral/Parenteral Nutrition: Not Applicable    Current Weight: 102.69lb on 9/22  Obtain New Weight  Obtain Weights Weekly     Pertinent Medications: MEDICATIONS  (STANDING):  amitriptyline 50 milliGRAM(s) Oral at bedtime  amLODIPine   Tablet 5 milliGRAM(s) Oral daily  amphetamine/dextroamphetamine 10 milliGRAM(s) Oral <User Schedule>  baclofen 20 milliGRAM(s) Oral every 8 hours  bisacodyl 5 milliGRAM(s) Oral at bedtime  dalfampridine ER 10 milliGRAM(s) Oral every 12 hours  enoxaparin Injectable 30 milliGRAM(s) SubCutaneous every 24 hours  escitalopram 10 milliGRAM(s) Oral daily  gabapentin 600 milliGRAM(s) Oral two times a day  Halobetasol 0.05% 1 Application(s),Halobetasol 0.05% 1 Application(s) 1 Application(s) Topical three times a day  hydrocortisone 1% Cream 1 Application(s) Topical two times a day  influenza   Vaccine 0.5 milliLiter(s) IntraMuscular once  lidocaine   4% Patch 1 Patch Transdermal daily  pantoprazole    Tablet 40 milliGRAM(s) Oral before breakfast  polyethylene glycol 3350 17 Gram(s) Oral daily  primidone 50 milliGRAM(s) Oral two times a day  senna 2 Tablet(s) Oral at bedtime    MEDICATIONS  (PRN):  acetaminophen     Tablet .. 650 milliGRAM(s) Oral every 6 hours PRN Temp greater or equal to 38C (100.4F), Mild Pain (1 - 3)  aluminum hydroxide/magnesium hydroxide/simethicone Suspension 30 milliLiter(s) Oral every 4 hours PRN Dyspepsia  bisacodyl Suppository 10 milliGRAM(s) Rectal daily PRN Constipation  melatonin 6 milliGRAM(s) Oral at bedtime PRN Insomnia  ondansetron Injectable 4 milliGRAM(s) IV Push every 8 hours PRN Nausea and/or Vomiting  traMADol 50 milliGRAM(s) Oral every 12 hours PRN Severe Pain (7 - 10)  traMADol 25 milliGRAM(s) Oral every 6 hours PRN Moderate Pain (4 - 6)    Pertinent Labs:  09-23 Na139 mmol/L Glu 87 mg/dL K+ 3.7 mmol/L Cr  0.67 mg/dL BUN 17 mg/dL 09-23 Alb 3.1 g/dL[L]    Skin: No Pressure Ulcers     Edema: None Noted (as Per Documentation)     Last Bowel Movement: on 9/21    Estimated Needs:   [X] No Change Since Previous Assessment    Previous Nutrition Diagnosis:   Moderate Malnutrition    Nutrition Diagnosis is [X] Ongoing - Declines Nutrition Supplementation     New Nutrition Diagnosis: [X] Not Applicable    Interventions:   1. Recommend Continue Nutrition Plan of Care     Monitoring & Evaluation:   [X] Weights   [X] PO Intake   [X] Skin Integrity   [X] Follow Up (Per Protocol)  [X] Tolerance to Diet Prescription   [X] Other: Labs     Registered Dietitian/Nutritionist Remains Available.  Satish Garcias, JOSEN, CDN    Phone# [phone]

## 2024-09-23 NOTE — PROGRESS NOTE ADULT - SUBJECTIVE AND OBJECTIVE BOX
Patient seen and examined at bedside. stated she is leaving tomorrow. No new complaints.      ALLERGIES:  No Known Allergies    MEDICATIONS  (STANDING):  amitriptyline 50 milliGRAM(s) Oral at bedtime  amLODIPine   Tablet 5 milliGRAM(s) Oral daily  amphetamine/dextroamphetamine 10 milliGRAM(s) Oral <User Schedule>  baclofen 20 milliGRAM(s) Oral every 8 hours  bisacodyl 5 milliGRAM(s) Oral at bedtime  dalfampridine ER 10 milliGRAM(s) Oral every 12 hours  enoxaparin Injectable 30 milliGRAM(s) SubCutaneous every 24 hours  escitalopram 10 milliGRAM(s) Oral daily  gabapentin 600 milliGRAM(s) Oral two times a day  Halobetasol 0.05% 1 Application(s),Halobetasol 0.05% 1 Application(s) 1 Application(s) Topical three times a day  hydrocortisone 1% Cream 1 Application(s) Topical two times a day  influenza   Vaccine 0.5 milliLiter(s) IntraMuscular once  lidocaine   4% Patch 1 Patch Transdermal daily  pantoprazole    Tablet 40 milliGRAM(s) Oral before breakfast  polyethylene glycol 3350 17 Gram(s) Oral daily  primidone 50 milliGRAM(s) Oral two times a day  senna 2 Tablet(s) Oral at bedtime    MEDICATIONS  (PRN):  acetaminophen     Tablet .. 650 milliGRAM(s) Oral every 6 hours PRN Temp greater or equal to 38C (100.4F), Mild Pain (1 - 3)  aluminum hydroxide/magnesium hydroxide/simethicone Suspension 30 milliLiter(s) Oral every 4 hours PRN Dyspepsia  bisacodyl Suppository 10 milliGRAM(s) Rectal daily PRN Constipation  melatonin 6 milliGRAM(s) Oral at bedtime PRN Insomnia  ondansetron Injectable 4 milliGRAM(s) IV Push every 8 hours PRN Nausea and/or Vomiting  traMADol 50 milliGRAM(s) Oral every 12 hours PRN Severe Pain (7 - 10)  traMADol 25 milliGRAM(s) Oral every 6 hours PRN Moderate Pain (4 - 6)    T(C): 37 (09-23-24 @ 09:18), Max: 37 (09-23-24 @ 09:18)  T(F): 98.6 (09-23-24 @ 09:18), Max: 98.6 (09-23-24 @ 09:18)  HR: 100 (09-23-24 @ 09:18) (77 - 100)  BP: 113/74 (09-23-24 @ 09:18) (107/71 - 121/77)  ABP: --  ABP(mean): --  RR: 16 (09-23-24 @ 09:18) (16 - 18)  SpO2: 98% (09-23-24 @ 09:18) (98% - 98%)      BMI (kg/m2): 19.8 (09-18-24 @ 05:58)  PHYSICAL EXAM:    Gen: nad, resting in bed  Neuro: aaox3, no focal deficits  Heent: eomi b/l, no jvd, no oral exudates  Pulm: cta b/l, no w/r/r  CV: +s1s2, no m/r/g  Ab: soft, nt/nd, normoactive bs x 4  Extrem: no edema, pulses intact and equal  Skin: no rashes  Psych: normal    LABS:                        12.5   6.29  )-----------( 341      ( 23 Sep 2024 05:30 )             37.1     09-23    139  |  102  |  17  ----------------------------<  87  3.7   |  31  |  0.67    Ca    8.9      23 Sep 2024 05:30    TPro  5.9[L]  /  Alb  3.1[L]  /  TBili  0.2  /  DBili  x   /  AST  9[L]  /  ALT  15  /  AlkPhos  96  09-23      Urinalysis Basic - ( 23 Sep 2024 05:30 )    Color: x / Appearance: x / SG: x / pH: x  Gluc: 87 mg/dL / Ketone: x  / Bili: x / Urobili: x   Blood: x / Protein: x / Nitrite: x   Leuk Esterase: x / RBC: x / WBC x   Sq Epi: x / Non Sq Epi: x / Bacteria: x       CAPILLARY BLOOD GLUCOSE            Urinalysis Basic - ( 23 Sep 2024 05:30 )    Color: x / Appearance: x / SG: x / pH: x  Gluc: 87 mg/dL / Ketone: x  / Bili: x / Urobili: x   Blood: x / Protein: x / Nitrite: x   Leuk Esterase: x / RBC: x / WBC x   Sq Epi: x / Non Sq Epi: x / Bacteria: x        RADIOLOGY & ADDITIONAL TESTS:    Consultant(s) Notes Reviewed:  [x ] YES  [ ] NO  Care Discussed with Consultants/Other Providers [ x] YES  [ ] NO  Imaging Personally Reviewed:  [x ] YES  [ ] NO
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
## 2024-09-23 NOTE — DISCHARGE NOTE NURSING/CASE MANAGEMENT/SOCIAL WORK - PATIENT PORTAL LINK FT
You can access the FollowMyHealth Patient Portal offered by [facility] by registering at the following website: [email or web address]. By joining [employer]’s FollowMyHealth portal, you will also be able to view your health information using other applications (apps) compatible with our system.

## 2024-09-23 NOTE — PROGRESS NOTE ADULT - TIME BILLING
- Ordering, reviewing, and interpreting labs, testing, and imaging.  - Independently obtaining a review of systems and performing a physical exam  - Reviewing prior hospitalization and where necessary, outpatient records.  - Counselling and educating patient and family regarding interpretation of aforementioned items and plan of care.
This includes reviewing results/imaging and discussions with specialists, nursing, case management/social work. Further tests, medications, and procedures have been ordered as indicated. Results and the plan of care were communicated to the patient and/or their family member. Supporting documentation was completed and added to the patient's chart.
- Ordering, reviewing, and interpreting labs, testing, and imaging.  - Independently obtaining a review of systems and performing a physical exam  - Reviewing prior hospitalization and where necessary, outpatient records.  - Counselling and educating patient and family regarding interpretation of aforementioned items and plan of care.
Time spent includes direct patient care (interview and examination of patient), discussion with other providers, support staff and/or patient's family members, review of medical records, ordering diagnostic tests and analyzing results, and documentation

## 2024-09-23 NOTE — DISCHARGE NOTE NURSING/CASE MANAGEMENT/SOCIAL WORK - NSDCPEFALRISK_GEN_ALL_CORE
For information on Fall & Injury Prevention, visit: [email or web address] OR  [email or web address] OR  https://www.cdc.gov/steadi/patient.html
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
## 2024-09-24 ENCOUNTER — APPOINTMENT (OUTPATIENT)
Dept: NEUROLOGY | Facility: CLINIC | Age: 49
End: 2024-09-24

## 2024-09-24 VITALS
OXYGEN SATURATION: 100 % | RESPIRATION RATE: 17 BRPM | SYSTOLIC BLOOD PRESSURE: 114 MMHG | DIASTOLIC BLOOD PRESSURE: 72 MMHG | TEMPERATURE: 98 F | HEART RATE: 65 BPM

## 2024-09-24 PROCEDURE — 87635 SARS-COV-2 COVID-19 AMP PRB: CPT

## 2024-09-24 PROCEDURE — 85025 COMPLETE CBC W/AUTO DIFF WBC: CPT

## 2024-09-24 PROCEDURE — 97116 GAIT TRAINING THERAPY: CPT | Mod: GP

## 2024-09-24 PROCEDURE — 97530 THERAPEUTIC ACTIVITIES: CPT | Mod: GP

## 2024-09-24 PROCEDURE — 97161 PT EVAL LOW COMPLEX 20 MIN: CPT | Mod: GP

## 2024-09-24 PROCEDURE — 97535 SELF CARE MNGMENT TRAINING: CPT | Mod: GO

## 2024-09-24 PROCEDURE — 97110 THERAPEUTIC EXERCISES: CPT | Mod: GO

## 2024-09-24 PROCEDURE — 99239 HOSP IP/OBS DSCHRG MGMT >30: CPT

## 2024-09-24 PROCEDURE — 80053 COMPREHEN METABOLIC PANEL: CPT

## 2024-09-24 PROCEDURE — 36415 COLL VENOUS BLD VENIPUNCTURE: CPT

## 2024-09-24 PROCEDURE — 97165 OT EVAL LOW COMPLEX 30 MIN: CPT | Mod: GO

## 2024-09-24 PROCEDURE — 85027 COMPLETE CBC AUTOMATED: CPT

## 2024-09-24 RX ADMIN — PANTOPRAZOLE SODIUM 40 MILLIGRAM(S): 40 TABLET, DELAYED RELEASE ORAL at 06:09

## 2024-09-24 RX ADMIN — GABAPENTIN 600 MILLIGRAM(S): 800 TABLET, FILM COATED ORAL at 06:09

## 2024-09-24 RX ADMIN — LIDOCAINE 1 PATCH: 50 CREAM TOPICAL at 11:43

## 2024-09-24 RX ADMIN — Medication 10 MILLIGRAM(S): at 11:42

## 2024-09-24 RX ADMIN — DEXTROAMPHETAMINE SULFATE, DEXTROAMPHETAMINE SACCHARATE, AMPHETAMINE SULFATE AND AMPHETAMINE ASPARTATE 10 MILLIGRAM(S): 6.25; 6.25; 6.25; 6.25 CAPSULE, EXTENDED RELEASE ORAL at 08:33

## 2024-09-24 RX ADMIN — Medication 20 MILLIGRAM(S): at 06:09

## 2024-09-24 RX ADMIN — Medication 5 MILLIGRAM(S): at 06:09

## 2024-09-24 RX ADMIN — Medication 50 MILLIGRAM(S): at 06:09

## 2024-09-24 NOTE — PROGRESS NOTE ADULT - PROVIDER SPECIALTY LIST ADULT
Hospitalist
Hospitalist
Physiatry
Hospitalist
Hospitalist
Neuropsychology
Neuropsychology
Physiatry
Physiatry
Hospitalist
Hospitalist
Physiatry
Physiatry
Hospitalist
Physiatry
Hospitalist
Physiatry

## 2024-09-24 NOTE — PROGRESS NOTE ADULT - REASON FOR ADMISSION
MS exacerbation

## 2024-09-24 NOTE — PROGRESS NOTE ADULT - NUTRITIONAL ASSESSMENT
This patient has been assessed with a concern for Malnutrition and has been determined to have a diagnosis/diagnoses of Moderate protein-calorie malnutrition.    This patient is being managed with:   Diet DASH/TLC-  Sodium & Cholesterol Restricted  Entered: Sep 11 2024  6:34PM    The following pending diet order is being considered for treatment of Moderate protein-calorie malnutrition:  Diet Regular-  Entered: Sep 12 2024 11:23AM  
This patient has been assessed with a concern for Malnutrition and has been determined to have a diagnosis/diagnoses of Moderate protein-calorie malnutrition.    This patient is being managed with:   Diet Regular-  Entered: Sep 12 2024 11:23AM  
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
## 2024-09-26 ENCOUNTER — APPOINTMENT (OUTPATIENT)
Dept: PHYSICAL MEDICINE AND REHAB | Facility: CLINIC | Age: 49
End: 2024-09-26
Payer: COMMERCIAL

## 2024-09-26 ENCOUNTER — APPOINTMENT (OUTPATIENT)
Dept: NEUROLOGY | Facility: CLINIC | Age: 49
End: 2024-09-26

## 2024-09-26 DIAGNOSIS — G35 MULTIPLE SCLEROSIS: ICD-10-CM

## 2024-09-26 DIAGNOSIS — M47.816 SPONDYLOSIS W/OUT MYELOPATHY OR RADICULOPATHY, LUMBAR REGION: ICD-10-CM

## 2024-09-26 DIAGNOSIS — M79.18 MYALGIA, OTHER SITE: ICD-10-CM

## 2024-09-26 DIAGNOSIS — M96.1 POSTLAMINECTOMY SYNDROME, NOT ELSEWHERE CLASSIFIED: ICD-10-CM

## 2024-09-26 DIAGNOSIS — M54.12 RADICULOPATHY, CERVICAL REGION: ICD-10-CM

## 2024-09-26 DIAGNOSIS — G89.29 MYALGIA, OTHER SITE: ICD-10-CM

## 2024-09-26 DIAGNOSIS — G89.4 CHRONIC PAIN SYNDROME: ICD-10-CM

## 2024-09-26 PROCEDURE — 99214 OFFICE O/P EST MOD 30 MIN: CPT

## 2024-09-26 NOTE — PHYSICAL EXAM
[FreeTextEntry1] : PHYSICAL EXAM Constitutional: Alert, no acute distress Psychiatric: appropriate affect and mood Pulmonary: No respiratory distress, stable on room air  NEUROLOGICAL EXAM Mental status: The patient is alert, attentive and conversational memory intact. Speech/language: Mild dysarthria Cranial nerves: CN II: ? RAPD OS CN III, IV, VI: EOMI, no nystagmus, no ptosis CN V: Facial sensation is intact to pinprick in all 3 divisions bilaterally. CN VII: Face is symmetric with normal eye closure and smile. CN VII: Hearing is normal to rubbing fingers CN IX, X: Palate elevates symmetrically. Phonation is normal. CN XI: Head turning and shoulder shrug are intact CN XII: Tongue is midline with normal movements and no atrophy. Motor: B/l UE 4 -> 4+/5 throughout.  RLE: 4/5 HF, 5/5 KE/KF, 4+/5 DF and 4+/5 PF  LLE: 3/5 HF, 5/5 KE, 2/5 DF, 4-/5 PF Reflexes: R L  Biceps 3+ 3+  Patellar 3+ 3+  Achilles 2+ 2+  Plantar responses- Mute b/l Sensory: Sensations intact to LT UE and LE. Coordination: Dysmetria on FNF and HTS b/l (L>R). Mild Head tremor and b/l action/postural hand tremors. Gait/Stance: Wide based, ataxic gait. Truncal ataxia. Can barely take 2 steps unassisted due to lower back pain and stiffness.

## 2024-09-26 NOTE — DATA REVIEWED
[de-identified] : MR brain 8/27/2024 stable.   MR C/T/L spine ww/o 6/15/24: vague patchy demyelinating lesions throughout cord with mild volume loss in C cord most notable at C4/5, no definite new cord lesions or enhancement on my review. L3/4 mild canal stenosis with R mod NF narrowing and L4/5 mild canal stenosis and possible impingement of L L5 nerve root.  MRI brain w/w/o 7/2023 (compared w/ 4/28/2022) - stable, moderate demyelinating disease.  Last brain MRI 4/2022 and C/T spine 11/2022- stable per patient at [facility]. (reviewed reports)- MRI brain 4/2022- stable disease burden compared to 9/2021. No active lesions MRI C and T spine 11/2022- stable c/w 9/2021. Diffuse/patchy lesions from C1/C2 to T2/T3, C5-T5/6 and scattered T7-T8-T10, ? T2 focal cord atrophy.

## 2024-09-26 NOTE — HISTORY OF PRESENT ILLNESS
[FreeTextEntry1] : SUGAR CESPEDES is here for follow up with her . Since last visit she was admitted to inpatient acute rehab. She was discharged less than 2 days ago. Notes that she was able to participate in daily, intensive 3 hours of therapy. She is very happy that she was able to spend the past 3 weeks there. This was our plan during her last visit. Today she is in better spirits. She was not seen by home therapy, but is scheduled to have them come to her house. Pain in the neck and back is similar to previous visits. She does note that her Elavil was increased to 50mg PO qhs and also started on Lexapro. She was discharged home with all her medications and does not need any renewal yet.   Denies any new pain, numbness or weakness, bowel/bladder dysfunction, saddle anesthesia, fevers, chills, weight loss, night pain, or night sweats at this time.
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
## 2024-09-26 NOTE — PHYSICAL EXAM
[FreeTextEntry1] : General exam   Constitutional: The patient appears well-developed, well-nourished, and in no apparent distress. Patient is well-groomed.    Skin: The skin is warm and dry, with normal turgor.  Eyes: PERRL.    ENMT: Ears: Hearing is grossly within normal limits.    Neck: Supple: The neck is supple.    Respiratory: Inspection: Breathing unlabored.    Neurologic: Alert and oriented x 3.   Psychiatric: Patient is cooperative and appropriate.  Mood and affect are normal.  Patient's insight is good, and memory and judgment are intact.  In manual WC Mood appears to be improved, she is smiling

## 2024-10-02 DIAGNOSIS — L40.0 PSORIASIS VULGARIS: ICD-10-CM

## 2024-10-02 RX ORDER — HALOBETASOL PROPIONATE 0.5 MG/G
0.05 OINTMENT TOPICAL TWICE DAILY
Qty: 1 | Refills: 2 | Status: ACTIVE | COMMUNITY
Start: 2024-10-02 | End: 1900-01-01

## 2024-10-07 ENCOUNTER — APPOINTMENT (OUTPATIENT)
Dept: PHYSICAL MEDICINE AND REHAB | Facility: CLINIC | Age: 49
End: 2024-10-07
Payer: MEDICARE

## 2024-10-07 PROCEDURE — 99441: CPT | Mod: 93

## 2024-10-07 RX ORDER — ONABOTULINUMTOXINA 200 [USP'U]/1
200 INJECTION, POWDER, LYOPHILIZED, FOR SOLUTION INTRADERMAL; INTRAMUSCULAR
Qty: 2 | Refills: 0 | Status: ACTIVE | OUTPATIENT
Start: 2024-10-07

## 2024-10-15 ENCOUNTER — NON-APPOINTMENT (OUTPATIENT)
Age: 49
End: 2024-10-15

## 2024-10-18 ENCOUNTER — APPOINTMENT (OUTPATIENT)
Dept: RHEUMATOLOGY | Facility: CLINIC | Age: 49
End: 2024-10-18

## 2024-10-18 VITALS
RESPIRATION RATE: 18 BRPM | HEART RATE: 85 BPM | OXYGEN SATURATION: 98 % | HEIGHT: 62 IN | SYSTOLIC BLOOD PRESSURE: 122 MMHG | TEMPERATURE: 97.8 F | DIASTOLIC BLOOD PRESSURE: 78 MMHG

## 2024-10-18 DIAGNOSIS — M79.642 PAIN IN RIGHT HAND: ICD-10-CM

## 2024-10-18 DIAGNOSIS — M79.641 PAIN IN RIGHT HAND: ICD-10-CM

## 2024-10-18 DIAGNOSIS — M79.671 PAIN IN RIGHT FOOT: ICD-10-CM

## 2024-10-18 DIAGNOSIS — M79.672 PAIN IN RIGHT FOOT: ICD-10-CM

## 2024-10-18 DIAGNOSIS — L40.9 PSORIASIS, UNSPECIFIED: ICD-10-CM

## 2024-10-18 PROCEDURE — 99204 OFFICE O/P NEW MOD 45 MIN: CPT

## 2024-10-22 ENCOUNTER — APPOINTMENT (OUTPATIENT)
Dept: NEUROLOGY | Facility: CLINIC | Age: 49
End: 2024-10-22

## 2024-10-22 PROCEDURE — 96413 CHEMO IV INFUSION 1 HR: CPT

## 2024-10-22 PROCEDURE — 96415 CHEMO IV INFUSION ADDL HR: CPT

## 2024-10-22 RX ORDER — METHYLPREDNISOLONE 125 MG/2ML
125 INJECTION, POWDER, LYOPHILIZED, FOR SOLUTION INTRAMUSCULAR; INTRAVENOUS
Qty: 1 | Refills: 0 | Status: ACTIVE | OUTPATIENT
Start: 2024-10-22

## 2024-10-22 RX ORDER — DIPHENHYDRAMINE HCL 25 MG/1
25 CAPSULE ORAL
Qty: 2 | Refills: 0 | Status: ACTIVE | OUTPATIENT
Start: 2024-10-22

## 2024-10-22 RX ORDER — FAMOTIDINE 20 MG/1
20 TABLET, FILM COATED ORAL
Qty: 1 | Refills: 0 | Status: ACTIVE | OUTPATIENT
Start: 2024-10-22

## 2024-10-22 RX ORDER — ACETAMINOPHEN 325 MG/1
325 TABLET ORAL
Qty: 2 | Refills: 0 | Status: ACTIVE | OUTPATIENT
Start: 2024-10-22

## 2024-10-23 ENCOUNTER — RESULT REVIEW (OUTPATIENT)
Age: 49
End: 2024-10-23

## 2024-10-23 ENCOUNTER — APPOINTMENT (OUTPATIENT)
Dept: RADIOLOGY | Facility: HOSPITAL | Age: 49
End: 2024-10-23
Payer: MEDICARE

## 2024-10-23 LAB
BASOPHILS # BLD AUTO: 0.06 K/UL
BASOPHILS NFR BLD AUTO: 0.5 %
EOSINOPHIL # BLD AUTO: 0.89 K/UL
EOSINOPHIL NFR BLD AUTO: 7.9 %
ERYTHROCYTE [SEDIMENTATION RATE] IN BLOOD BY WESTERGREN METHOD: 7 MM/HR
HCT VFR BLD CALC: 42.1 %
HGB BLD-MCNC: 13.9 G/DL
IMM GRANULOCYTES NFR BLD AUTO: 0.2 %
LYMPHOCYTES # BLD AUTO: 2.7 K/UL
LYMPHOCYTES NFR BLD AUTO: 23.9 %
MAN DIFF?: NORMAL
MCHC RBC-ENTMCNC: 31.2 PG
MCHC RBC-ENTMCNC: 33 GM/DL
MCV RBC AUTO: 94.6 FL
MONOCYTES # BLD AUTO: 1.18 K/UL
MONOCYTES NFR BLD AUTO: 10.4 %
NEUTROPHILS # BLD AUTO: 6.46 K/UL
NEUTROPHILS NFR BLD AUTO: 57.1 %
PLATELET # BLD AUTO: 331 K/UL
RBC # BLD: 4.45 M/UL
RBC # FLD: 12.5 %
WBC # FLD AUTO: 11.31 K/UL

## 2024-10-23 PROCEDURE — 73130 X-RAY EXAM OF HAND: CPT | Mod: 26,50

## 2024-10-23 PROCEDURE — 72202 X-RAY EXAM SI JOINTS 3/> VWS: CPT | Mod: 26

## 2024-10-23 PROCEDURE — 73630 X-RAY EXAM OF FOOT: CPT | Mod: 26,50

## 2024-10-24 LAB
ALBUMIN SERPL ELPH-MCNC: 4.5 G/DL
ALP BLD-CCNC: 127 U/L
ALT SERPL-CCNC: 11 U/L
ANION GAP SERPL CALC-SCNC: 16 MMOL/L
ANION GAP SERPL CALC-SCNC: 18 MMOL/L
AST SERPL-CCNC: 13 U/L
BILIRUB SERPL-MCNC: <0.2 MG/DL
BUN SERPL-MCNC: 9 MG/DL
BUN SERPL-MCNC: 9 MG/DL
CALCIUM SERPL-MCNC: 9.2 MG/DL
CALCIUM SERPL-MCNC: 9.5 MG/DL
CHLORIDE SERPL-SCNC: 101 MMOL/L
CHLORIDE SERPL-SCNC: 102 MMOL/L
CO2 SERPL-SCNC: 24 MMOL/L
CO2 SERPL-SCNC: 25 MMOL/L
CREAT SERPL-MCNC: 0.68 MG/DL
CREAT SERPL-MCNC: 0.7 MG/DL
CRP SERPL-MCNC: <3 MG/L
EGFR: 105 ML/MIN/1.73M2
EGFR: 106 ML/MIN/1.73M2
GLUCOSE SERPL-MCNC: 56 MG/DL
GLUCOSE SERPL-MCNC: 58 MG/DL
HCV AB SER QL: NONREACTIVE
HCV S/CO RATIO: 0.06 S/CO
POTASSIUM SERPL-SCNC: 4.1 MMOL/L
POTASSIUM SERPL-SCNC: 4.3 MMOL/L
PROT SERPL-MCNC: 6.7 G/DL
SODIUM SERPL-SCNC: 142 MMOL/L
SODIUM SERPL-SCNC: 144 MMOL/L

## 2024-10-31 ENCOUNTER — APPOINTMENT (OUTPATIENT)
Dept: DERMATOLOGY | Facility: CLINIC | Age: 49
End: 2024-10-31
Payer: MEDICARE

## 2024-10-31 DIAGNOSIS — L40.0 PSORIASIS VULGARIS: ICD-10-CM

## 2024-10-31 PROCEDURE — 99213 OFFICE O/P EST LOW 20 MIN: CPT

## 2024-10-31 PROCEDURE — 99203 OFFICE O/P NEW LOW 30 MIN: CPT

## 2024-10-31 RX ORDER — FLUOCINOLONE ACETONIDE 0.25 MG/G
0.03 OINTMENT TOPICAL TWICE DAILY
Qty: 1 | Refills: 5 | Status: ACTIVE | COMMUNITY
Start: 2024-10-31 | End: 1900-01-01

## 2024-10-31 NOTE — BH CONSULTATION LIAISON ASSESSMENT NOTE - NSBHPSYCHHX_PSY_A_CORE
[facility]- [facility] [address], [place] [address] office: [phone] fax: [phone]     Physical Therapy Re-Certification Plan of Care    Dear Andrea Byrd MD  ,    We had the pleasure of treating the following patient for physical therapy services at [facility]. A summary of our findings can be found in the updated assessment below.  This includes our plan of care.  If you have any questions or concerns regarding these findings, please do not hesitate to contact me at the office phone number checked above.  Thank you for the referral.     Physician Signature:________________________________Date:__________________  By signing above (or electronic signature), therapist's plan is approved by physician      Functional Outcome: LEFI 40% limited  Conor Valencia 2009 continues to present with functional deficits in strength symmetry and endurance of strength  limiting ability with managing community ambulation, walking up/down stairs, carrying items, lifting items, pushing or pulling activity, light home activity, heavy home activity, and participation in sport   .  During therapy this date, patient required verbal cueing, modification of technique, and progression of exercises and program for improving proper muscle recruitment and activation/motor control patterns, modulating pain, allowing for proper ROM, and improving flexibility . Patient will continue to benefit from ongoing evaluation and advanced clinical decision from a Physical Therapist to improve pain control, muscle strength, neuromuscular control, endurance, and high level IADLs to safely return to PLOF and advanced sport activity without symptoms or restrictions.    Overall Response to Treatment:  Patient is responding fair to well to treatment and improvement is noted with regards to goals.  Acute flare up after attempting functional return to sport ~ 2  no

## 2024-11-12 DIAGNOSIS — L57.8 OTHER SKIN CHANGES DUE TO CHRONIC EXPOSURE TO NONIONIZING RADIATION: ICD-10-CM

## 2024-11-12 RX ORDER — TRETINOIN 0.25 MG/G
0.03 CREAM TOPICAL
Qty: 1 | Refills: 5 | Status: ACTIVE | COMMUNITY
Start: 2024-11-12 | End: 1900-01-01

## 2024-11-14 ENCOUNTER — APPOINTMENT (OUTPATIENT)
Dept: PHYSICAL MEDICINE AND REHAB | Facility: CLINIC | Age: 49
End: 2024-11-14
Payer: COMMERCIAL

## 2024-11-14 DIAGNOSIS — R53.81 OTHER MALAISE: ICD-10-CM

## 2024-11-14 DIAGNOSIS — M96.1 POSTLAMINECTOMY SYNDROME, NOT ELSEWHERE CLASSIFIED: ICD-10-CM

## 2024-11-14 DIAGNOSIS — M79.18 MYALGIA, OTHER SITE: ICD-10-CM

## 2024-11-14 DIAGNOSIS — G89.29 MYALGIA, OTHER SITE: ICD-10-CM

## 2024-11-14 DIAGNOSIS — G89.4 CHRONIC PAIN SYNDROME: ICD-10-CM

## 2024-11-14 PROCEDURE — G3002: CPT

## 2024-11-14 PROCEDURE — 20553 NJX 1/MLT TRIGGER POINTS 3/>: CPT

## 2024-11-14 PROCEDURE — 99214 OFFICE O/P EST MOD 30 MIN: CPT | Mod: 25

## 2024-11-14 RX ORDER — AMITRIPTYLINE HYDROCHLORIDE 50 MG/1
50 TABLET, FILM COATED ORAL
Qty: 30 | Refills: 1 | Status: ACTIVE | COMMUNITY
Start: 2024-11-14 | End: 1900-01-01

## 2024-11-14 RX ORDER — METHYLPRED ACET/NACL,ISO-OS/PF 80 MG/ML
80 VIAL (ML) INJECTION
Refills: 0 | Status: COMPLETED | OUTPATIENT
Start: 2024-11-14

## 2024-11-14 RX ORDER — OXYCODONE 5 MG/1
5 TABLET ORAL
Qty: 60 | Refills: 0 | Status: ACTIVE | COMMUNITY
Start: 2024-11-14 | End: 1900-01-01

## 2024-11-14 RX ADMIN — METHYLPREDNISOLONE ACETATE 0 MG/ML: 80 INJECTION, SUSPENSION INTRA-ARTICULAR; INTRALESIONAL; INTRAMUSCULAR; SOFT TISSUE at 00:00

## 2024-11-22 ENCOUNTER — APPOINTMENT (OUTPATIENT)
Dept: PHYSICAL MEDICINE AND REHAB | Facility: CLINIC | Age: 49
End: 2024-11-22
Payer: MEDICARE

## 2024-11-22 DIAGNOSIS — G35 MULTIPLE SCLEROSIS: ICD-10-CM

## 2024-11-22 DIAGNOSIS — R25.2 CRAMP AND SPASM: ICD-10-CM

## 2024-11-22 PROCEDURE — 64643 CHEMODENERV 1 EXTREM 1-4 EA: CPT

## 2024-11-22 PROCEDURE — 99213 OFFICE O/P EST LOW 20 MIN: CPT | Mod: 25

## 2024-11-22 PROCEDURE — 95874 GUIDE NERV DESTR NEEDLE EMG: CPT

## 2024-11-22 PROCEDURE — 64642 CHEMODENERV 1 EXTREMITY 1-4: CPT
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
## 2024-12-03 ENCOUNTER — NON-APPOINTMENT (OUTPATIENT)
Age: 49
End: 2024-12-03

## 2024-12-26 ENCOUNTER — RX RENEWAL (OUTPATIENT)
Age: 49
End: 2024-12-26

## 2025-01-10 ENCOUNTER — APPOINTMENT (OUTPATIENT)
Dept: PHYSICAL MEDICINE AND REHAB | Facility: CLINIC | Age: 50
End: 2025-01-10
Payer: MEDICARE

## 2025-01-10 PROCEDURE — 99213 OFFICE O/P EST LOW 20 MIN: CPT

## 2025-01-10 RX ORDER — ONABOTULINUMTOXINA 200 [USP'U]/1
200 INJECTION, POWDER, LYOPHILIZED, FOR SOLUTION INTRADERMAL; INTRAMUSCULAR
Qty: 2 | Refills: 0 | Status: ACTIVE | OUTPATIENT
Start: 2025-01-10

## 2025-01-12 NOTE — PHYSICAL THERAPY INITIAL EVALUATION ADULT - PATIENT PROFILE REVIEW, REHAB EVAL
Page sent to provider: Brittney Salomon NP- Urology 08:33 (on call Dr. Richard Zhou)  Paged returned 08:36    Outcome: Provider orders:  Increase H2O , No lifting, if catsup/merlot color, pelvic pain, go to ED, continue Xaralto   yes

## 2025-01-21 ENCOUNTER — APPOINTMENT (OUTPATIENT)
Dept: RHEUMATOLOGY | Facility: CLINIC | Age: 50
End: 2025-01-21
Payer: MEDICARE

## 2025-01-21 PROCEDURE — 99214 OFFICE O/P EST MOD 30 MIN: CPT | Mod: 2W

## 2025-03-04 ENCOUNTER — APPOINTMENT (OUTPATIENT)
Dept: PHYSICAL MEDICINE AND REHAB | Facility: CLINIC | Age: 50
End: 2025-03-04

## 2025-03-24 NOTE — ED ADULT TRIAGE NOTE - ESI TRIAGE ACUITY LEVEL, MLM
3 PAST SURGICAL HISTORY:  Hemorrhagic ovarian cyst s/p laparoscopic surgery    History of keloid of skin s/p excision    History of strabismus surgery childhood    Personal history of spine surgery microdiscectomy, laminectomy L4-5  6/2020    S/P embolization of ovarian artery 2017

## 2025-04-01 ENCOUNTER — RX RENEWAL (OUTPATIENT)
Age: 50
End: 2025-04-01

## 2025-04-07 ENCOUNTER — APPOINTMENT (OUTPATIENT)
Dept: PHYSICAL MEDICINE AND REHAB | Facility: CLINIC | Age: 50
End: 2025-04-07
Payer: MEDICARE

## 2025-04-07 DIAGNOSIS — G35 MULTIPLE SCLEROSIS: ICD-10-CM

## 2025-04-07 DIAGNOSIS — R25.2 CRAMP AND SPASM: ICD-10-CM

## 2025-04-07 PROCEDURE — 95874 GUIDE NERV DESTR NEEDLE EMG: CPT

## 2025-04-07 PROCEDURE — 64643 CHEMODENERV 1 EXTREM 1-4 EA: CPT

## 2025-04-07 PROCEDURE — 64642 CHEMODENERV 1 EXTREMITY 1-4: CPT

## 2025-04-07 PROCEDURE — 99213 OFFICE O/P EST LOW 20 MIN: CPT | Mod: 25

## 2025-04-21 ENCOUNTER — EMERGENCY (EMERGENCY)
Facility: HOSPITAL | Age: 50
LOS: 1 days | End: 2025-04-21
Attending: EMERGENCY MEDICINE | Admitting: EMERGENCY MEDICINE
Payer: MEDICARE

## 2025-04-21 VITALS
DIASTOLIC BLOOD PRESSURE: 97 MMHG | HEIGHT: 62 IN | TEMPERATURE: 98 F | HEART RATE: 105 BPM | SYSTOLIC BLOOD PRESSURE: 141 MMHG | OXYGEN SATURATION: 99 % | RESPIRATION RATE: 18 BRPM | WEIGHT: 104.94 LBS

## 2025-04-21 DIAGNOSIS — Z87.59 PERSONAL HISTORY OF OTHER COMPLICATIONS OF PREGNANCY, CHILDBIRTH AND THE PUERPERIUM: Chronic | ICD-10-CM

## 2025-04-21 LAB
ALBUMIN SERPL ELPH-MCNC: 4.1 G/DL — SIGNIFICANT CHANGE UP (ref 3.3–5)
ALP SERPL-CCNC: 128 U/L — HIGH (ref 40–120)
ALT FLD-CCNC: 20 U/L — SIGNIFICANT CHANGE UP (ref 10–45)
ANION GAP SERPL CALC-SCNC: 6 MMOL/L — SIGNIFICANT CHANGE UP (ref 5–17)
APTT BLD: 29.7 SEC — SIGNIFICANT CHANGE UP (ref 24.5–35.6)
AST SERPL-CCNC: 16 U/L — SIGNIFICANT CHANGE UP (ref 10–40)
BASOPHILS # BLD AUTO: 0.05 K/UL — SIGNIFICANT CHANGE UP (ref 0–0.2)
BASOPHILS NFR BLD AUTO: 0.5 % — SIGNIFICANT CHANGE UP (ref 0–2)
BILIRUB SERPL-MCNC: 0.2 MG/DL — SIGNIFICANT CHANGE UP (ref 0.2–1.2)
BUN SERPL-MCNC: 10 MG/DL — SIGNIFICANT CHANGE UP (ref 7–23)
CALCIUM SERPL-MCNC: 9.2 MG/DL — SIGNIFICANT CHANGE UP (ref 8.4–10.5)
CHLORIDE SERPL-SCNC: 102 MMOL/L — SIGNIFICANT CHANGE UP (ref 96–108)
CO2 SERPL-SCNC: 30 MMOL/L — SIGNIFICANT CHANGE UP (ref 22–31)
CREAT SERPL-MCNC: 0.59 MG/DL — SIGNIFICANT CHANGE UP (ref 0.5–1.3)
EGFR: 109 ML/MIN/1.73M2 — SIGNIFICANT CHANGE UP
EGFR: 109 ML/MIN/1.73M2 — SIGNIFICANT CHANGE UP
EOSINOPHIL # BLD AUTO: 0.16 K/UL — SIGNIFICANT CHANGE UP (ref 0–0.5)
EOSINOPHIL NFR BLD AUTO: 1.8 % — SIGNIFICANT CHANGE UP (ref 0–6)
GLUCOSE SERPL-MCNC: 96 MG/DL — SIGNIFICANT CHANGE UP (ref 70–99)
HCT VFR BLD CALC: 41.6 % — SIGNIFICANT CHANGE UP (ref 34.5–45)
HGB BLD-MCNC: 14.3 G/DL — SIGNIFICANT CHANGE UP (ref 11.5–15.5)
IMM GRANULOCYTES NFR BLD AUTO: 0.2 % — SIGNIFICANT CHANGE UP (ref 0–0.9)
INR BLD: 0.93 RATIO — SIGNIFICANT CHANGE UP (ref 0.85–1.16)
LYMPHOCYTES # BLD AUTO: 1.8 K/UL — SIGNIFICANT CHANGE UP (ref 1–3.3)
LYMPHOCYTES # BLD AUTO: 19.7 % — SIGNIFICANT CHANGE UP (ref 13–44)
MCHC RBC-ENTMCNC: 31.2 PG — SIGNIFICANT CHANGE UP (ref 27–34)
MCHC RBC-ENTMCNC: 34.4 G/DL — SIGNIFICANT CHANGE UP (ref 32–36)
MCV RBC AUTO: 90.8 FL — SIGNIFICANT CHANGE UP (ref 80–100)
MONOCYTES # BLD AUTO: 0.64 K/UL — SIGNIFICANT CHANGE UP (ref 0–0.9)
MONOCYTES NFR BLD AUTO: 7 % — SIGNIFICANT CHANGE UP (ref 2–14)
NEUTROPHILS # BLD AUTO: 6.46 K/UL — SIGNIFICANT CHANGE UP (ref 1.8–7.4)
NEUTROPHILS NFR BLD AUTO: 70.8 % — SIGNIFICANT CHANGE UP (ref 43–77)
NRBC BLD AUTO-RTO: 0 /100 WBCS — SIGNIFICANT CHANGE UP (ref 0–0)
NT-PROBNP SERPL-SCNC: 68 PG/ML — SIGNIFICANT CHANGE UP (ref 0–300)
PLATELET # BLD AUTO: 372 K/UL — SIGNIFICANT CHANGE UP (ref 150–400)
POTASSIUM SERPL-MCNC: 4.1 MMOL/L — SIGNIFICANT CHANGE UP (ref 3.5–5.3)
POTASSIUM SERPL-SCNC: 4.1 MMOL/L — SIGNIFICANT CHANGE UP (ref 3.5–5.3)
PROT SERPL-MCNC: 7.3 G/DL — SIGNIFICANT CHANGE UP (ref 6–8.3)
PROTHROM AB SERPL-ACNC: 11 SEC — SIGNIFICANT CHANGE UP (ref 9.9–13.4)
RBC # BLD: 4.58 M/UL — SIGNIFICANT CHANGE UP (ref 3.8–5.2)
RBC # FLD: 11.8 % — SIGNIFICANT CHANGE UP (ref 10.3–14.5)
SODIUM SERPL-SCNC: 138 MMOL/L — SIGNIFICANT CHANGE UP (ref 135–145)
WBC # BLD: 9.13 K/UL — SIGNIFICANT CHANGE UP (ref 3.8–10.5)
WBC # FLD AUTO: 9.13 K/UL — SIGNIFICANT CHANGE UP (ref 3.8–10.5)

## 2025-04-21 PROCEDURE — 85025 COMPLETE CBC W/AUTO DIFF WBC: CPT

## 2025-04-21 PROCEDURE — 80053 COMPREHEN METABOLIC PANEL: CPT

## 2025-04-21 PROCEDURE — 93971 EXTREMITY STUDY: CPT | Mod: 26,LT

## 2025-04-21 PROCEDURE — 83880 ASSAY OF NATRIURETIC PEPTIDE: CPT

## 2025-04-21 PROCEDURE — 93971 EXTREMITY STUDY: CPT

## 2025-04-21 PROCEDURE — 85730 THROMBOPLASTIN TIME PARTIAL: CPT

## 2025-04-21 PROCEDURE — 36415 COLL VENOUS BLD VENIPUNCTURE: CPT

## 2025-04-21 PROCEDURE — 85610 PROTHROMBIN TIME: CPT

## 2025-04-21 PROCEDURE — 99284 EMERGENCY DEPT VISIT MOD MDM: CPT

## 2025-04-21 PROCEDURE — 99284 EMERGENCY DEPT VISIT MOD MDM: CPT | Mod: 25

## 2025-04-21 NOTE — ED ADULT NURSE NOTE - CCCP TRG CHIEF CMPLNT
Pt calling requesting a refill of Trazadone. Pt is scheduled for a follow up appt. With Dr. Brunner on 06/16/20. Pt requesting refill order be sent today. Pt hasnt been able to sleep last couple nights.    lower leg pain/injury

## 2025-04-21 NOTE — ED PROVIDER NOTE - PATIENT PORTAL LINK FT
You can access the FollowMyHealth Patient Portal offered by [facility] by registering at the following website: [email or web address]. By joining [employer]’s FollowMyHealth portal, you will also be able to view your health information using other applications (apps) compatible with our system.

## 2025-04-21 NOTE — ED ADULT NURSE NOTE - NSFALLUNIVINTERV_ED_ALL_ED
Bed/Stretcher in lowest position, wheels locked, appropriate side rails in place/Call bell, personal items and telephone in reach/Instruct patient to call for assistance before getting out of bed/chair/stretcher/Non-slip footwear applied when patient is off stretcher/[place] to call system/Physically safe environment - no spills, clutter or unnecessary equipment/Purposeful proactive rounding/Room/bathroom lighting operational, light cord in reach

## 2025-04-21 NOTE — ED PROVIDER NOTE - CLINICAL SUMMARY MEDICAL DECISION MAKING FREE TEXT BOX
50 year old female with left lower leg swelling, labs reviewed- acceptable, US showed no DVT, result and plan d/w the patient and family and they voiced good understanding

## 2025-04-21 NOTE — ED PROVIDER NOTE - OBJECTIVE STATEMENT
50 year old female with left lower leg swelling for 3 days. Denies trauma ,fever, cough ,sob, NVD, dysuria. Denies any other symptoms. H/o MS.

## 2025-04-22 ENCOUNTER — APPOINTMENT (OUTPATIENT)
Dept: NEUROLOGY | Facility: CLINIC | Age: 50
End: 2025-04-22

## 2025-04-22 PROCEDURE — 96415 CHEMO IV INFUSION ADDL HR: CPT

## 2025-04-22 PROCEDURE — 96413 CHEMO IV INFUSION 1 HR: CPT

## 2025-05-08 ENCOUNTER — APPOINTMENT (OUTPATIENT)
Dept: PHYSICAL MEDICINE AND REHAB | Facility: CLINIC | Age: 50
End: 2025-05-08

## 2025-05-09 ENCOUNTER — APPOINTMENT (OUTPATIENT)
Dept: NEUROLOGY | Facility: CLINIC | Age: 50
End: 2025-05-09
Payer: MEDICARE

## 2025-05-09 PROCEDURE — G2211 COMPLEX E/M VISIT ADD ON: CPT | Mod: 2W

## 2025-05-09 PROCEDURE — 99214 OFFICE O/P EST MOD 30 MIN: CPT | Mod: 2W
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
## 2025-05-16 DIAGNOSIS — G35 MULTIPLE SCLEROSIS: ICD-10-CM

## 2025-05-16 LAB
ALBUMIN SERPL ELPH-MCNC: 4.4 G/DL
ALP BLD-CCNC: 117 U/L
ALT SERPL-CCNC: 14 U/L
ANION GAP SERPL CALC-SCNC: 13 MMOL/L
AST SERPL-CCNC: 13 U/L
BASOPHILS # BLD AUTO: 0.06 K/UL
BASOPHILS NFR BLD AUTO: 0.6 %
BILIRUB SERPL-MCNC: <0.2 MG/DL
BUN SERPL-MCNC: 11 MG/DL
CALCIUM SERPL-MCNC: 9.7 MG/DL
CD16+CD56+ CELLS # BLD: 280 CELLS/UL
CD16+CD56+ CELLS NFR BLD: 14 %
CD19 CELLS NFR BLD: 5 CELLS/UL
CD3 CELLS # BLD: 1774 CELLS/UL
CD3 CELLS NFR BLD: 85 %
CD3+CD4+ CELLS # BLD: 1345 CELLS/UL
CD3+CD4+ CELLS NFR BLD: 63 %
CD3+CD4+ CELLS/CD3+CD8+ CLL SPEC: 3.32 RATIO
CD3+CD8+ CELLS # SPEC: 405 CELLS/UL
CD3+CD8+ CELLS NFR BLD: 19 %
CELLS.CD3-CD19+/CELLS IN BLOOD: <1 %
CHLORIDE SERPL-SCNC: 101 MMOL/L
CO2 SERPL-SCNC: 26 MMOL/L
CREAT SERPL-MCNC: 0.58 MG/DL
DEPRECATED KAPPA LC FREE/LAMBDA SER: 0.9 RATIO
EGFRCR SERPLBLD CKD-EPI 2021: 110 ML/MIN/1.73M2
EOSINOPHIL # BLD AUTO: 0.95 K/UL
EOSINOPHIL NFR BLD AUTO: 10 %
GLUCOSE SERPL-MCNC: 80 MG/DL
HCT VFR BLD CALC: 43.9 %
HGB BLD-MCNC: 14.2 G/DL
IGA SERPL-MCNC: 106 MG/DL
IGG SERPL-MCNC: 562 MG/DL
IGM SERPL-MCNC: 88 MG/DL
IMM GRANULOCYTES NFR BLD AUTO: 0.2 %
KAPPA LC CSF-MCNC: 1.16 MG/DL
KAPPA LC SERPL-MCNC: 1.04 MG/DL
LYMPHOCYTES # BLD AUTO: 2.02 K/UL
LYMPHOCYTES NFR BLD AUTO: 21.2 %
MAN DIFF?: NORMAL
MCHC RBC-ENTMCNC: 30.7 PG
MCHC RBC-ENTMCNC: 32.3 G/DL
MCV RBC AUTO: 95 FL
MONOCYTES # BLD AUTO: 0.87 K/UL
MONOCYTES NFR BLD AUTO: 9.1 %
NEUTROPHILS # BLD AUTO: 5.62 K/UL
NEUTROPHILS NFR BLD AUTO: 58.9 %
PLATELET # BLD AUTO: 408 K/UL
POTASSIUM SERPL-SCNC: 4.4 MMOL/L
PROT SERPL-MCNC: 6.6 G/DL
RBC # BLD: 4.62 M/UL
RBC # FLD: 12.5 %
SODIUM SERPL-SCNC: 140 MMOL/L
VIABILITY: NORMAL
WBC # FLD AUTO: 9.54 K/UL

## 2025-06-03 ENCOUNTER — APPOINTMENT (OUTPATIENT)
Dept: PHYSICAL MEDICINE AND REHAB | Facility: CLINIC | Age: 50
End: 2025-06-03
Payer: MEDICARE

## 2025-06-03 PROCEDURE — 99212 OFFICE O/P EST SF 10 MIN: CPT | Mod: 2W

## 2025-06-03 RX ORDER — ONABOTULINUMTOXINA 200 [USP'U]/1
200 INJECTION, POWDER, LYOPHILIZED, FOR SOLUTION INTRADERMAL; INTRAMUSCULAR
Qty: 2 | Refills: 0 | Status: ACTIVE | OUTPATIENT
Start: 2025-06-03

## 2025-06-16 PROBLEM — J30.9 ALLERGIC SINUSITIS: Status: ACTIVE | Noted: 2025-06-16

## 2025-06-19 ENCOUNTER — APPOINTMENT (OUTPATIENT)
Dept: PEDIATRIC ALLERGY IMMUNOLOGY | Facility: CLINIC | Age: 50
End: 2025-06-19
Payer: MEDICARE

## 2025-06-19 VITALS — SYSTOLIC BLOOD PRESSURE: 122 MMHG | OXYGEN SATURATION: 97 % | DIASTOLIC BLOOD PRESSURE: 76 MMHG | HEART RATE: 97 BPM

## 2025-06-19 PROCEDURE — 36415 COLL VENOUS BLD VENIPUNCTURE: CPT

## 2025-06-19 PROCEDURE — 99205 OFFICE O/P NEW HI 60 MIN: CPT | Mod: GC

## 2025-06-19 PROCEDURE — G2211 COMPLEX E/M VISIT ADD ON: CPT

## 2025-06-20 PROBLEM — D80.1 HYPOGAMMAGLOBULINEMIA, ACQUIRED: Status: ACTIVE | Noted: 2025-06-16

## 2025-06-23 PROBLEM — D72.119 HYPEREOSINOPHILIC SYNDROME: Status: ACTIVE | Noted: 2025-06-19

## 2025-06-24 ENCOUNTER — NON-APPOINTMENT (OUTPATIENT)
Age: 50
End: 2025-06-24

## 2025-06-24 LAB
BASOPHILS # BLD AUTO: 0.06 K/UL
BASOPHILS NFR BLD AUTO: 0.7 %
C DIPHTHERIAE AB SER QL: 0.24 IU/ML
C TETANI IGG SER-ACNC: 2.92 IU/ML
DEPRECATED KAPPA LC FREE/LAMBDA SER: 0.87 RATIO
EOSINOPHIL # BLD AUTO: 0.71 K/UL
EOSINOPHIL NFR BLD AUTO: 8.6 %
HAEM INFLU B AB SER-MCNC: 1.62 UG/ML
HCT VFR BLD CALC: 42.6 %
HGB BLD-MCNC: 13.5 G/DL
IGA SERPL-MCNC: 105 MG/DL
IGE SER-MCNC: 28 KU/L
IGG SERPL-MCNC: 562 MG/DL
IGG SERPL-MCNC: 562 MG/DL
IGG1 SER-MCNC: 292 MG/DL
IGG2 SER-MCNC: 235 MG/DL
IGG3 SER-MCNC: 14.8 MG/DL
IGG4 SER-MCNC: 9.8 MG/DL
IGM SERPL-MCNC: 88 MG/DL
IMM GRANULOCYTES NFR BLD AUTO: 0.4 %
KAPPA LC CSF-MCNC: 1.28 MG/DL
KAPPA LC SERPL-MCNC: 1.11 MG/DL
LYMPHOCYTES # BLD AUTO: 1.82 K/UL
LYMPHOCYTES NFR BLD AUTO: 22 %
MAN DIFF?: NORMAL
MCHC RBC-ENTMCNC: 30.8 PG
MCHC RBC-ENTMCNC: 31.7 G/DL
MCV RBC AUTO: 97 FL
MEV IGG FLD QL IA: 160 AU/ML
MEV IGG+IGM SER-IMP: POSITIVE
MONOCYTES # BLD AUTO: 0.96 K/UL
MONOCYTES NFR BLD AUTO: 11.6 %
MUV AB SER-ACNC: POSITIVE
MUV IGG SER QL IA: 222 AU/ML
NEUTROPHILS # BLD AUTO: 4.69 K/UL
NEUTROPHILS NFR BLD AUTO: 56.7 %
PLATELET # BLD AUTO: 383 K/UL
RBC # BLD: 4.39 M/UL
RBC # FLD: 12.9 %
RUBV IGG FLD-ACNC: 3.51 INDEX
RUBV IGG SER-IMP: POSITIVE
WBC # FLD AUTO: 8.27 K/UL

## 2025-06-25 ENCOUNTER — APPOINTMENT (OUTPATIENT)
Dept: PHYSICAL MEDICINE AND REHAB | Facility: CLINIC | Age: 50
End: 2025-06-25
Payer: MEDICARE

## 2025-06-25 PROBLEM — M79.18 MYOFASCIAL PAIN SYNDROME: Status: ACTIVE | Noted: 2025-06-25

## 2025-06-25 PROCEDURE — 99215 OFFICE O/P EST HI 40 MIN: CPT | Mod: 25

## 2025-06-25 PROCEDURE — 20553 NJX 1/MLT TRIGGER POINTS 3/>: CPT

## 2025-06-25 PROCEDURE — 99205 OFFICE O/P NEW HI 60 MIN: CPT | Mod: 25

## 2025-06-26 LAB
DEPRECATED S PNEUM 1 IGG SER-MCNC: 0.2 MCG/ML
DEPRECATED S PNEUM12 AB SER-ACNC: 0.5 MCG/ML
DEPRECATED S PNEUM14 AB SER-ACNC: 2.3 MCG/ML
DEPRECATED S PNEUM17 IGG SER IA-MCNC: 1.4 MCG/ML
DEPRECATED S PNEUM18 IGG SER IA-MCNC: 0.7 MCG/ML
DEPRECATED S PNEUM19 IGG SER-MCNC: 1.9 MCG/ML
DEPRECATED S PNEUM19 IGG SER-MCNC: 3.3 MCG/ML
DEPRECATED S PNEUM2 IGG SER-MCNC: 0.4 MCG/ML
DEPRECATED S PNEUM20 IGG SER-MCNC: 2.5 MCG/ML
DEPRECATED S PNEUM22 IGG SER-MCNC: 1.4 MCG/ML
DEPRECATED S PNEUM23 AB SER-ACNC: 0.8 MCG/ML
DEPRECATED S PNEUM3 AB SER-ACNC: 0.2 MCG/ML
DEPRECATED S PNEUM34 IGG SER-MCNC: 0.9 MCG/ML
DEPRECATED S PNEUM4 AB SER-ACNC: 0.3 MCG/ML
DEPRECATED S PNEUM5 IGG SER-MCNC: 0.2 MCG/ML
DEPRECATED S PNEUM6 IGG SER-MCNC: 0.6 MCG/ML
DEPRECATED S PNEUM7 IGG SER-ACNC: 0.9 MCG/ML
DEPRECATED S PNEUM8 AB SER-ACNC: 0.9 MCG/ML
DEPRECATED S PNEUM9 AB SER-ACNC: 0.7 MCG/ML
DEPRECATED S PNEUM9 IGG SER-MCNC: 0.3 MCG/ML
IMMUNOLOGIST REVIEW: NORMAL
STREPTOCOCCUS PNEUMONIAE SEROTYPE 11A: 1 MCG/ML
STREPTOCOCCUS PNEUMONIAE SEROTYPE 15B: 8.9 MCG/ML
STREPTOCOCCUS PNEUMONIAE SEROTYPE 33F: 2.6 MCG/ML

## 2025-06-28 LAB
POLIO 1 TITER BY  NEUTRALIZATION: NORMAL
POLIO 3 TITER BY  NEUTRALIZATION: NORMAL

## 2025-07-01 ENCOUNTER — APPOINTMENT (OUTPATIENT)
Dept: DERMATOLOGY | Facility: CLINIC | Age: 50
End: 2025-07-01
Payer: MEDICARE

## 2025-07-01 PROBLEM — L53.9 ERYTHEMA: Status: ACTIVE | Noted: 2025-07-01

## 2025-07-01 PROBLEM — D69.2 PURPURA: Status: ACTIVE | Noted: 2025-07-01

## 2025-07-01 PROCEDURE — 99214 OFFICE O/P EST MOD 30 MIN: CPT

## 2025-07-02 ENCOUNTER — NON-APPOINTMENT (OUTPATIENT)
Age: 50
End: 2025-07-02

## 2025-07-08 ENCOUNTER — RX RENEWAL (OUTPATIENT)
Age: 50
End: 2025-07-08

## 2025-07-08 RX ORDER — HALOBETASOL PROPIONATE 0.5 MG/G
0.05 OINTMENT TOPICAL
Qty: 1 | Refills: 2 | Status: ACTIVE | COMMUNITY
Start: 2025-07-08 | End: 1900-01-01

## 2025-07-22 ENCOUNTER — APPOINTMENT (OUTPATIENT)
Dept: PHYSICAL MEDICINE AND REHAB | Facility: CLINIC | Age: 50
End: 2025-07-22
Payer: MEDICARE

## 2025-07-22 DIAGNOSIS — R25.2 CRAMP AND SPASM: ICD-10-CM

## 2025-07-22 PROCEDURE — 99213 OFFICE O/P EST LOW 20 MIN: CPT | Mod: 25

## 2025-07-22 PROCEDURE — 95874 GUIDE NERV DESTR NEEDLE EMG: CPT

## 2025-07-22 PROCEDURE — 64643 CHEMODENERV 1 EXTREM 1-4 EA: CPT

## 2025-07-22 PROCEDURE — 64642 CHEMODENERV 1 EXTREMITY 1-4: CPT

## 2025-07-23 ENCOUNTER — APPOINTMENT (OUTPATIENT)
Dept: PHYSICAL MEDICINE AND REHAB | Facility: CLINIC | Age: 50
End: 2025-07-23
Payer: MEDICARE

## 2025-07-23 ENCOUNTER — APPOINTMENT (OUTPATIENT)
Dept: RHEUMATOLOGY | Facility: CLINIC | Age: 50
End: 2025-07-23

## 2025-07-23 DIAGNOSIS — M79.18 MYALGIA, OTHER SITE: ICD-10-CM

## 2025-07-23 DIAGNOSIS — M70.61 TROCHANTERIC BURSITIS, RIGHT HIP: ICD-10-CM

## 2025-07-23 DIAGNOSIS — G89.29 MYALGIA, OTHER SITE: ICD-10-CM

## 2025-07-23 PROCEDURE — 99214 OFFICE O/P EST MOD 30 MIN: CPT | Mod: 25

## 2025-07-23 PROCEDURE — 20611 DRAIN/INJ JOINT/BURSA W/US: CPT | Mod: RT

## 2025-07-29 ENCOUNTER — APPOINTMENT (OUTPATIENT)
Dept: MRI IMAGING | Facility: CLINIC | Age: 50
End: 2025-07-29
Payer: MEDICARE

## 2025-07-29 ENCOUNTER — OUTPATIENT (OUTPATIENT)
Dept: OUTPATIENT SERVICES | Facility: HOSPITAL | Age: 50
LOS: 1 days | End: 2025-07-29
Payer: MEDICARE

## 2025-07-29 DIAGNOSIS — Z87.59 PERSONAL HISTORY OF OTHER COMPLICATIONS OF PREGNANCY, CHILDBIRTH AND THE PUERPERIUM: Chronic | ICD-10-CM

## 2025-07-29 DIAGNOSIS — G35 MULTIPLE SCLEROSIS: ICD-10-CM

## 2025-07-29 PROCEDURE — 72141 MRI NECK SPINE W/O DYE: CPT

## 2025-07-29 PROCEDURE — 72141 MRI NECK SPINE W/O DYE: CPT | Mod: 26

## 2025-07-29 PROCEDURE — 70551 MRI BRAIN STEM W/O DYE: CPT | Mod: 26

## 2025-07-29 PROCEDURE — 72146 MRI CHEST SPINE W/O DYE: CPT | Mod: 26

## 2025-07-29 PROCEDURE — 70551 MRI BRAIN STEM W/O DYE: CPT

## 2025-07-29 PROCEDURE — 72146 MRI CHEST SPINE W/O DYE: CPT

## 2025-08-01 ENCOUNTER — OUTPATIENT (OUTPATIENT)
Dept: OUTPATIENT SERVICES | Facility: HOSPITAL | Age: 50
LOS: 1 days | End: 2025-08-01
Payer: MEDICARE

## 2025-08-01 ENCOUNTER — APPOINTMENT (OUTPATIENT)
Dept: PHYSICAL MEDICINE AND REHAB | Facility: CLINIC | Age: 50
End: 2025-08-01
Payer: MEDICARE

## 2025-08-01 DIAGNOSIS — Z87.59 PERSONAL HISTORY OF OTHER COMPLICATIONS OF PREGNANCY, CHILDBIRTH AND THE PUERPERIUM: Chronic | ICD-10-CM

## 2025-08-01 DIAGNOSIS — M46.1 SACROILIITIS, NOT ELSEWHERE CLASSIFIED: ICD-10-CM

## 2025-08-01 DIAGNOSIS — M53.3 SACROCOCCYGEAL DISORDERS, NOT ELSEWHERE CLASSIFIED: ICD-10-CM

## 2025-08-01 PROCEDURE — 27096 INJECT SACROILIAC JOINT: CPT | Mod: 50

## 2025-08-01 PROCEDURE — G0260: CPT

## 2025-08-04 ENCOUNTER — TRANSCRIPTION ENCOUNTER (OUTPATIENT)
Age: 50
End: 2025-08-04

## 2025-08-11 ENCOUNTER — APPOINTMENT (OUTPATIENT)
Dept: NEUROLOGY | Facility: CLINIC | Age: 50
End: 2025-08-11
Payer: MEDICARE

## 2025-08-11 DIAGNOSIS — G35 MULTIPLE SCLEROSIS: ICD-10-CM

## 2025-08-11 PROCEDURE — 99215 OFFICE O/P EST HI 40 MIN: CPT | Mod: 2W

## 2025-09-02 ENCOUNTER — APPOINTMENT (OUTPATIENT)
Dept: PHYSICAL MEDICINE AND REHAB | Facility: CLINIC | Age: 50
End: 2025-09-02
Payer: MEDICARE

## 2025-09-02 PROCEDURE — 99212 OFFICE O/P EST SF 10 MIN: CPT | Mod: 2W

## 2025-09-02 RX ORDER — ONABOTULINUMTOXINA 200 [USP'U]/1
200 INJECTION, POWDER, LYOPHILIZED, FOR SOLUTION INTRADERMAL; INTRAMUSCULAR
Qty: 3 | Refills: 0 | Status: ACTIVE | OUTPATIENT
Start: 2025-09-02